# Patient Record
Sex: FEMALE | Race: WHITE | NOT HISPANIC OR LATINO
[De-identification: names, ages, dates, MRNs, and addresses within clinical notes are randomized per-mention and may not be internally consistent; named-entity substitution may affect disease eponyms.]

---

## 2018-04-17 ENCOUNTER — TRANSCRIPTION ENCOUNTER (OUTPATIENT)
Age: 73
End: 2018-04-17

## 2018-08-08 ENCOUNTER — TRANSCRIPTION ENCOUNTER (OUTPATIENT)
Age: 73
End: 2018-08-08

## 2018-09-20 ENCOUNTER — TRANSCRIPTION ENCOUNTER (OUTPATIENT)
Age: 73
End: 2018-09-20

## 2019-01-11 ENCOUNTER — TRANSCRIPTION ENCOUNTER (OUTPATIENT)
Age: 74
End: 2019-01-11

## 2019-01-25 ENCOUNTER — TRANSCRIPTION ENCOUNTER (OUTPATIENT)
Age: 74
End: 2019-01-25

## 2019-02-04 ENCOUNTER — OUTPATIENT (OUTPATIENT)
Dept: OUTPATIENT SERVICES | Facility: HOSPITAL | Age: 74
LOS: 1 days | End: 2019-02-04
Payer: COMMERCIAL

## 2019-02-04 ENCOUNTER — APPOINTMENT (OUTPATIENT)
Dept: CT IMAGING | Facility: CLINIC | Age: 74
End: 2019-02-04
Payer: COMMERCIAL

## 2019-02-04 DIAGNOSIS — Z00.8 ENCOUNTER FOR OTHER GENERAL EXAMINATION: ICD-10-CM

## 2019-02-04 PROCEDURE — 74177 CT ABD & PELVIS W/CONTRAST: CPT | Mod: 26

## 2019-02-04 PROCEDURE — 82565 ASSAY OF CREATININE: CPT

## 2019-02-04 PROCEDURE — 74177 CT ABD & PELVIS W/CONTRAST: CPT

## 2019-02-13 ENCOUNTER — TRANSCRIPTION ENCOUNTER (OUTPATIENT)
Age: 74
End: 2019-02-13

## 2019-02-25 ENCOUNTER — INPATIENT (INPATIENT)
Facility: HOSPITAL | Age: 74
LOS: 4 days | Discharge: HOME | DRG: 194 | End: 2019-03-02
Attending: INTERNAL MEDICINE | Admitting: GENERAL ACUTE CARE HOSPITAL
Payer: COMMERCIAL

## 2019-02-25 VITALS
RESPIRATION RATE: 24 BRPM | DIASTOLIC BLOOD PRESSURE: 74 MMHG | WEIGHT: 179.9 LBS | TEMPERATURE: 99 F | SYSTOLIC BLOOD PRESSURE: 134 MMHG | OXYGEN SATURATION: 96 % | HEART RATE: 103 BPM | HEIGHT: 67 IN

## 2019-02-25 DIAGNOSIS — Z29.9 ENCOUNTER FOR PROPHYLACTIC MEASURES, UNSPECIFIED: ICD-10-CM

## 2019-02-25 DIAGNOSIS — R91.1 SOLITARY PULMONARY NODULE: ICD-10-CM

## 2019-02-25 DIAGNOSIS — J45.901 UNSPECIFIED ASTHMA WITH (ACUTE) EXACERBATION: ICD-10-CM

## 2019-02-25 DIAGNOSIS — J47.9 BRONCHIECTASIS, UNCOMPLICATED: ICD-10-CM

## 2019-02-25 LAB
ANION GAP SERPL CALC-SCNC: 15 MMOL/L — SIGNIFICANT CHANGE UP (ref 5–17)
APPEARANCE UR: CLEAR — SIGNIFICANT CHANGE UP
BILIRUB UR-MCNC: NEGATIVE — SIGNIFICANT CHANGE UP
BUN SERPL-MCNC: 16 MG/DL — SIGNIFICANT CHANGE UP (ref 7–23)
CALCIUM SERPL-MCNC: 9 MG/DL — SIGNIFICANT CHANGE UP (ref 8.4–10.5)
CHLORIDE SERPL-SCNC: 99 MMOL/L — SIGNIFICANT CHANGE UP (ref 96–108)
CO2 SERPL-SCNC: 23 MMOL/L — SIGNIFICANT CHANGE UP (ref 22–31)
COLOR SPEC: SIGNIFICANT CHANGE UP
CREAT SERPL-MCNC: 0.94 MG/DL — SIGNIFICANT CHANGE UP (ref 0.5–1.3)
DIFF PNL FLD: NEGATIVE — SIGNIFICANT CHANGE UP
FLUAV H1 2009 PAND RNA SPEC QL NAA+PROBE: DETECTED
GAS PNL BLDV: SIGNIFICANT CHANGE UP
GLUCOSE SERPL-MCNC: 210 MG/DL — HIGH (ref 70–99)
GLUCOSE UR QL: ABNORMAL
HCOV PNL SPEC NAA+PROBE: DETECTED
KETONES UR-MCNC: ABNORMAL
LEUKOCYTE ESTERASE UR-ACNC: NEGATIVE — SIGNIFICANT CHANGE UP
NITRITE UR-MCNC: NEGATIVE — SIGNIFICANT CHANGE UP
PH UR: 6 — SIGNIFICANT CHANGE UP (ref 5–8)
POTASSIUM SERPL-MCNC: 4 MMOL/L — SIGNIFICANT CHANGE UP (ref 3.5–5.3)
POTASSIUM SERPL-SCNC: 4 MMOL/L — SIGNIFICANT CHANGE UP (ref 3.5–5.3)
PROT UR-MCNC: SIGNIFICANT CHANGE UP
RAPID RVP RESULT: DETECTED
SODIUM SERPL-SCNC: 137 MMOL/L — SIGNIFICANT CHANGE UP (ref 135–145)
SP GR SPEC: 1.02 — SIGNIFICANT CHANGE UP (ref 1.01–1.02)
UROBILINOGEN FLD QL: NEGATIVE — SIGNIFICANT CHANGE UP

## 2019-02-25 PROCEDURE — 71046 X-RAY EXAM CHEST 2 VIEWS: CPT | Mod: 26

## 2019-02-25 PROCEDURE — 99223 1ST HOSP IP/OBS HIGH 75: CPT

## 2019-02-25 PROCEDURE — 71250 CT THORAX DX C-: CPT | Mod: 26

## 2019-02-25 PROCEDURE — 93010 ELECTROCARDIOGRAM REPORT: CPT | Mod: NC

## 2019-02-25 PROCEDURE — 99285 EMERGENCY DEPT VISIT HI MDM: CPT | Mod: 25

## 2019-02-25 RX ORDER — ALBUTEROL 90 UG/1
2.5 AEROSOL, METERED ORAL
Qty: 0 | Refills: 0 | Status: COMPLETED | OUTPATIENT
Start: 2019-02-25 | End: 2019-02-25

## 2019-02-25 RX ORDER — BUDESONIDE, MICRONIZED 100 %
0.5 POWDER (GRAM) MISCELLANEOUS
Qty: 0 | Refills: 0 | Status: DISCONTINUED | OUTPATIENT
Start: 2019-02-25 | End: 2019-02-25

## 2019-02-25 RX ORDER — SODIUM CHLORIDE 9 MG/ML
1000 INJECTION INTRAMUSCULAR; INTRAVENOUS; SUBCUTANEOUS ONCE
Qty: 0 | Refills: 0 | Status: COMPLETED | OUTPATIENT
Start: 2019-02-25 | End: 2019-02-25

## 2019-02-25 RX ORDER — IPRATROPIUM/ALBUTEROL SULFATE 18-103MCG
3 AEROSOL WITH ADAPTER (GRAM) INHALATION
Qty: 0 | Refills: 0 | Status: DISCONTINUED | OUTPATIENT
Start: 2019-02-25 | End: 2019-03-02

## 2019-02-25 RX ORDER — AZITHROMYCIN 500 MG/1
500 TABLET, FILM COATED ORAL ONCE
Qty: 0 | Refills: 0 | Status: DISCONTINUED | OUTPATIENT
Start: 2019-02-25 | End: 2019-02-25

## 2019-02-25 RX ORDER — BUDESONIDE, MICRONIZED 100 %
0.5 POWDER (GRAM) MISCELLANEOUS EVERY 12 HOURS
Qty: 0 | Refills: 0 | Status: DISCONTINUED | OUTPATIENT
Start: 2019-02-25 | End: 2019-03-02

## 2019-02-25 RX ORDER — IPRATROPIUM/ALBUTEROL SULFATE 18-103MCG
3 AEROSOL WITH ADAPTER (GRAM) INHALATION
Qty: 0 | Refills: 0 | Status: COMPLETED | OUTPATIENT
Start: 2019-02-25 | End: 2019-02-25

## 2019-02-25 RX ORDER — CEFTRIAXONE 500 MG/1
1 INJECTION, POWDER, FOR SOLUTION INTRAMUSCULAR; INTRAVENOUS ONCE
Qty: 0 | Refills: 0 | Status: DISCONTINUED | OUTPATIENT
Start: 2019-02-25 | End: 2019-02-25

## 2019-02-25 RX ORDER — SODIUM CHLORIDE 9 MG/ML
2000 INJECTION, SOLUTION INTRAVENOUS ONCE
Qty: 0 | Refills: 0 | Status: COMPLETED | OUTPATIENT
Start: 2019-02-25 | End: 2019-02-25

## 2019-02-25 RX ORDER — LOSARTAN POTASSIUM 100 MG/1
50 TABLET, FILM COATED ORAL DAILY
Qty: 0 | Refills: 0 | Status: DISCONTINUED | OUTPATIENT
Start: 2019-02-26 | End: 2019-03-02

## 2019-02-25 RX ORDER — MONTELUKAST 4 MG/1
10 TABLET, CHEWABLE ORAL DAILY
Qty: 0 | Refills: 0 | Status: DISCONTINUED | OUTPATIENT
Start: 2019-02-25 | End: 2019-03-02

## 2019-02-25 RX ORDER — ENOXAPARIN SODIUM 100 MG/ML
40 INJECTION SUBCUTANEOUS DAILY
Qty: 0 | Refills: 0 | Status: DISCONTINUED | OUTPATIENT
Start: 2019-02-25 | End: 2019-03-02

## 2019-02-25 RX ADMIN — SODIUM CHLORIDE 2000 MILLILITER(S): 9 INJECTION, SOLUTION INTRAVENOUS at 10:10

## 2019-02-25 RX ADMIN — Medication 75 MILLIGRAM(S): at 19:21

## 2019-02-25 RX ADMIN — Medication 40 MILLIGRAM(S): at 20:09

## 2019-02-25 RX ADMIN — Medication 200 MILLIGRAM(S): at 22:14

## 2019-02-25 RX ADMIN — Medication 3 MILLILITER(S): at 22:15

## 2019-02-25 RX ADMIN — Medication 3 MILLILITER(S): at 10:20

## 2019-02-25 RX ADMIN — ALBUTEROL 2.5 MILLIGRAM(S): 90 AEROSOL, METERED ORAL at 16:50

## 2019-02-25 RX ADMIN — ALBUTEROL 2.5 MILLIGRAM(S): 90 AEROSOL, METERED ORAL at 19:21

## 2019-02-25 RX ADMIN — Medication 125 MILLIGRAM(S): at 10:10

## 2019-02-25 RX ADMIN — SODIUM CHLORIDE 1000 MILLILITER(S): 9 INJECTION INTRAMUSCULAR; INTRAVENOUS; SUBCUTANEOUS at 14:30

## 2019-02-25 RX ADMIN — Medication 3 MILLILITER(S): at 10:30

## 2019-02-25 RX ADMIN — Medication 3 MILLILITER(S): at 19:30

## 2019-02-25 RX ADMIN — ENOXAPARIN SODIUM 40 MILLIGRAM(S): 100 INJECTION SUBCUTANEOUS at 22:15

## 2019-02-25 RX ADMIN — Medication 3 MILLILITER(S): at 10:10

## 2019-02-25 NOTE — ED ADULT NURSE NOTE - OBJECTIVE STATEMENT
73 y/o F, reported to ED from home. A&Ox3, c/o SOB. Pt reports that she had an asthmatic episode starting last night. Pt treated with Albuterol at home. Pt reports that she was still experiencing SOB today. Pt's RR is 26 upon arrival to ED with an Sp02 of 95% on RA. Pt's HR is in the 100's. Pt reports MEMBRENO and reports increased difficulty with breathing. Pt reports that her discomfort is 5/10 on a pain scale. Lung sounds wheezing BL in all lobes with expiratory wheeze. Pt has a productive cough with yellowish secretions. Pt reports a runny nose but denies fevers. Pt reports that she has occasional chills. Pt also reports a hx of bronchiectasis. Pt denies LOC, H/A, C/P, N/V/D, abd pain. Pt denies urinary symptoms.  at bedside, will continue to monitor pt.

## 2019-02-25 NOTE — ED PROVIDER NOTE - PHYSICAL EXAMINATION
CONSTITUTIONAL: Patient is awake, alert and oriented x 3. Patient is mildly tachypneic but  in no acute distress.  HEAD: NCAT,   EYES: PERRL b/l, EOMI,   ENT: Airway patent, Nasal mucosa clear. Mouth with normal mucosa. Throat has no vesicles, no oropharyngeal exudates and uvula is midline.  NECK: supple, No LAD,  LUNGS: Tachypnea. Overall poor airflow b/l. There is mild wheezing in the left upper lobe, otherwise CTA B/L,   HEART: Tachycardic RR.+S1S2 no murmurs,   ABDOMEN: Soft nd/nt+bs no rebound or guarding.   EXTREMITY: no edema or calf tenderness b/l, FROM upper and lower ext b/l  SKIN: with no rash or lesions.   NEURO: No focal deficits

## 2019-02-25 NOTE — CONSULT NOTE ADULT - CONSULT REASON
asthma/bronchiectasis with exacerbation; asthma/bronchiectasis with exacerbation; lung nodule; mucous plugging asthma/bronchiectasis with exacerbation; influenza; coronaviral infection; lung nodule; mucous plugging

## 2019-02-25 NOTE — ED PROVIDER NOTE - OBJECTIVE STATEMENT
74 year old female with pmhx HTN, asthma, bronchiectasis presents to ED c/o acute SOB starting last night. Patient reports intermittent coughing over the past month for which she was prescribed steroids two separate times last course finished last week. 74 year old female with pmhx HTN, asthma, bronchiectasis presents to ED c/o acute SOB starting last night. Patient reports intermittent coughing over the past month for which she was prescribed steroids two separate times last course finished last week. She states that cough is productive in nature, has been taking nebulizer treatments at home without relief. Denies fevers, chills, chest pain, abdominal pain, n/v/d, urinary symptoms, sick contacts, recent travel, calf swelling. Has been admitted for similar in past but denies prior icu stays or previous intubations

## 2019-02-25 NOTE — CONSULT NOTE ADULT - ASSESSMENT
ASSESSMENT:    persistent asthma/bronchiectasis exacerbation unresponsive to 2 courses of systemic steroids, inhaled steroids and long acting beta agonist nebulizers - "rule out" superimposed bacterial infection    HTN/HLD    h/o sigmoid diverticulitis s/p Melinda's procedure and reversal of colostomy in 2011    PLAN/RECOMMENDATIONS:    oxygen supplementation as needed to keep saturation greater than 92%  chest CT to better evaluate the lung parenchyma  RVP  solumedrol 40mg IVP q6h  albuterol/atrovent nebs q4h holding 2am dose  pulmicort nebs 0.5mg 2 times daily  singulair  GI/DVT prophylaxis  cozaar    Thank you for the courtesy of this referral. Plan of care discussed with the patient and her  at bedside and the ER staff.    Los Santos MD, [facility] - [phone]  Pulmonary Medicine ASSESSMENT:    persistent asthma/bronchiectasis exacerbation unresponsive to 2 courses of systemic steroids, inhaled steroids and long acting beta agonist nebulizers due to influenza and a coronaviral infection - "rule out" a superimposed bacterial infection    HTN/HLD    h/o sigmoid diverticulitis s/p Melinda's procedure and reversal of colostomy in 2011    PLAN/RECOMMENDATIONS:    oxygen supplementation as needed to keep saturation greater than 92%  chest CT to better evaluate the lung parenchyma  solumedrol 40mg IVP q6h  albuterol/atrovent nebs q4h holding 2am dose  pulmicort nebs 0.5mg 2 times daily  singulair  GI/DVT prophylaxis  cozaar    Thank you for the courtesy of this referral. Plan of care discussed with the patient and her  at bedside and the ER staff.    Los Santos MD, [facility] - [phone]  Pulmonary Medicine

## 2019-02-25 NOTE — H&P ADULT - NEGATIVE CARDIOVASCULAR SYMPTOMS
no paroxysmal nocturnal dyspnea/no palpitations/no chest pain/no dyspnea on exertion/no peripheral edema

## 2019-02-25 NOTE — H&P ADULT - ASSESSMENT
73 y/o F with PMH of Bronchiectasis, Asthma, and HTN p/w 3 days of worsening shortness of breath, wheezing, productive yellow cough.

## 2019-02-25 NOTE — ED ADULT NURSE REASSESSMENT NOTE - NS ED NURSE REASSESS COMMENT FT1
Pt is experiencing difficulty breathing suddenly. Pt's RR was 28, Sp02 is 90% on RA, and she feels as if her chest is "tight". Pt was placed on 4L NC and repositioned. Pt has expiratory wheeze in the left anterior chest. Pt was reassess by Chanell VELEZ. Pt was started on Albuterol treatment.  at bedside, will continue to monitor pt.

## 2019-02-25 NOTE — H&P ADULT - HISTORY OF PRESENT ILLNESS
73 y/o F with PMH of Bronchiectasis, Asthma, and HTN     ED vitals: 98.6, 103, 134/74, 26, 94% on RA  In ER was given 3L fluids, duoneb, solumedrol 73 y/o F with PMH of Bronchiectasis, Asthma, and HTN p/w 3 days of worsening shortness of breath, wheezing, productive yellow cough. Endorses chills but no fever/rigors. Patient has had URI symptoms and post nasal drip for the past few weeks and got 2 courses of po steroids as outpatient for urgent care and saw her outside pulmonologist who added singular which did help with symptoms. Denies hx of clots, LE swelling or pain, chest pain, n/v, abdominal pain.  endorses URI symptoms last weeks    ED vitals: 98.6, 103, 134/74, 26, 94% on RA  In ER was given 3L fluids, duoneb, solumedrol

## 2019-02-25 NOTE — ED PROVIDER NOTE - ATTENDING CONTRIBUTION TO CARE
75 yo F h/o asthma and bronchiectasis presents with 1     month of worsening cough and sob. cough is newly     productive of yellow phlegm and sob is rated a 5/10 at     this time. overnight cough/sob was severe, kept her up all     night coughing. she has been on two courses of prednisone,     5 days followed by 10 days, completed few days ago. stated     that meds helped initially but now symptoms have returned.     + rhinorrhea/nasal congestion.   no f/ch, chest pain, abd pain, N/V/D, leg pain/swelling.   no known PE/DVT risk factors.   PE no resp distress. lungs CTA. abd soft and NT. no leg     swelling or TTP. 73 yo F h/o asthma and bronchiectasis presents with 1   month of worsening cough and sob. cough is newly   productive of yellow phlegm and sob is rated a 5/10 at   this time. overnight cough/sob was severe, kept her up all   night coughing. she has been on two courses of prednisone,   5 days followed by 10 days, completed few days ago. stated   that meds helped initially but now symptoms have returned.   + rhinorrhea/nasal congestion.   no f/ch, chest pain, abd pain, N/V/D, leg pain/swelling.   no known PE/DVT risk factors.   PE no resp distress. lungs CTA. abd soft and NT. no leg   swelling or TTP.  cxr with clear lungs. ekg with sinu tach no acute st/tw changes. age adjusted d dimer is negative. mlid leukocytosis. paitent given duonebs and steroids in the er. pulmonology was conuslted, who came to see pt in the er and recommended admission given failure of 2 outpatient treatment courses. pulm to order abx of choice. pt admitted in stable confition    I reviewed all lab and imaging results from this ED visit, and discussed ALL results with the patient and/or family, including all abnormal results and incidental findings. I addressed all of patient's/family's questions and concerns, and I recommended appropriate follow up for all incidental findings. Based on patient's HPI as well as the results of today's workup, I felt that patient warranted admission to the hospital for further workup/evaluation and continued management. The patient was agreeable with admission. Patient was signed out to admitting team. Admitting team accepted the patient for admission and subsequently took over the patient's care in its entirety.

## 2019-02-25 NOTE — H&P ADULT - PROBLEM SELECTOR PLAN 3
0.5 cm solid nodule in the right lower lobe is unchanged when compared to previous exam  -outpatient followup for serial CTs

## 2019-02-25 NOTE — H&P ADULT - NSHPLABSRESULTS_GEN_ALL_CORE
personally reviewed labs:  WBC of 16, N 83%  D-dimer 231  CMP wnl except for tbili 2.3 (other LFT wnl)  vbg pH 7.38-7.42, pc02 44-50, 2.6 from 3.0  personally reviewed EKG: sinus @ 101, inverted p wave in v2, low voltage qrs    personally reviewed CT chest and chest xray: PROCEDURE DATE:  02/25/2019    INTERPRETATION:  Clinical information: Cough and shortness of breath.   Exam is compared to previous study of 8/19/2014.    CT scan of the chest was obtained without administration of intravenous   contrast.    No hilar and/or mediastinal adenopathy is noted.   Heart is normal in size. Calcification of the aortic valve and the   coronary arteries is noted. No pericardial effusion is noted.   No endobronchial lesions are noted. Mucus/secretions are present within   several bronchi within both lower lobes. 0.5 cm solid nodule in the right   lower lobe is unchanged when compared to previous exam. Remaining lungs   are clear. No pleural effusions are noted.  Small hiatal hernia is noted  Below the diaphragm, visualized portions of the abdomen demonstrate   patient to be status post cholecystectomy.   Degenerative changes of the spine are noted.  Impression: There is no pneumonia.

## 2019-02-25 NOTE — CONSULT NOTE ADULT - SUBJECTIVE AND OBJECTIVE BOX
[facility] - [facility]  CONSULT NOTE    HPI: 74 year old gentlewoman, lifelong non-smoker, followed by Dr. Ifeoma Bell of our practice for asthma and bronchiectasis. She was recently seen in our office with mild chest congestion associated wiht shortness of breath with exertion and post nasal drip. She is maintained on Pulmicort and Brovana nebulizers twice daily and was recently started on Singulair. She has required 2 steroid tapers over the last month    PMHX:  Asthma  Bronchiectasis  Bronchitis  Pneumonia - 2011  Diverticulitis  Hypercholesterolemia  Hypertension    PSHX:  Melinda's procedure with reversal of colostomy  History of Dilatation and Curettage      FAMILY HISTORY:  mother - Alzheimer's dementia  father - CAD  brother - CAD/MI  brother - DM    SOCIAL HISTORY:  lifelong non-smoker; caring for her sick ;     Pulmonary Medications:       Antimicrobials:      Cardiology:      Other:      Allergies    Bactrim (Short breath)  penicillins (Rash)  Zyflo (Stomach Upset; Vomiting)    Intolerances        HOME MEDICATIONS: see  H & P    REVIEW OF SYSTEMS:  Constitutional: As per HPI  HEENT: Within normal limits  CV: As per HPI  Resp: As per HPI  GI: Within normal limits   : Within normal limits  Musculoskeletal: Within normal limits  Skin: Within normal limits  Neurological: Within normal limits  Psychiatric: Within normal limits  Endocrine: Within normal limits  Hematologic/Lymphatic: Within normal limits  Allergic/Immunologic: Within normal limits    [x] All other systems negative    OBJECTIVE:    Daily Height in cm: 170.18 (25 Feb 2019 09:23)      PHYSICAL EXAM:  ICU Vital Signs Last 24 Hrs  T(C): 37.3 (25 Feb 2019 10:00), Max: 37.3 (25 Feb 2019 10:00)  T(F): 99.1 (25 Feb 2019 10:00), Max: 99.1 (25 Feb 2019 10:00)  HR: 101 (25 Feb 2019 11:21) (101 - 108)  BP: 133/59 (25 Feb 2019 11:21) (133/59 - 155/89)  BP(mean): --  ABP: --  ABP(mean): --  RR: 20 (25 Feb 2019 11:21) (20 - 26)  SpO2: 99% (25 Feb 2019 11:21) (95% - 99%)    General: Awake. Alert. Cooperative. No distress. Appears stated age 	  HEENT:   Atraumatic. Normocephalic. Anicteric. Normal oral mucosa. PERRL. EOMI.  Neck: Supple. Trachea midline. Thyroid without enlargement/tenderness/nodules. No carotid bruit. No JVD.	  Cardiovascular: Regular rate and rhythm. S1 S2 normal. No murmurs, rubs or gallops.  Respiratory: Respirations unlabored. Clear to auscultation and percussion bilaterally. No curvature.  Abdomen: Soft. Non-tender. Non-distended. No organomegaly. No masses. Normal bowel sounds.  Extremities: Warm to touch. No clubbing or cyanosis. No pedal edema.  Pulses: 2+ peripheral pulses all extremities.	  Skin: Normal skin color. No rashes or lesions. No ecchymoses. No cyanosis. Warm to touch.  Lymph Nodes: Cervical, supraclavicular and axillary nodes normal  Neurological: Motor and sensory examination equal and normal. A and O x 3  Psychiatry: Appropriate mood and affect.      LABS:                          16.6   16.1  )-----------( 301      ( 25 Feb 2019 10:13 )             50.3     02-25    140  |  98  |  18  ----------------------------<  193<H>  4.1   |  25  |  1.00    Ca      9.8      02-25      TPro  7.5  /  Alb  4.6  /  TBili  2.3<H>  /  DBili  x   /  AST  19  /  ALT  16  /  AlkPhos  98  02-25    PT/INR - ( 25 Feb 2019 10:13 )   PT: 14.0 sec;   INR: 1.22 ratio       PTT - ( 25 Feb 2019 10:13 )  PTT: 29.9 sec    Venous Blood Gas:  02-25 @ 10:12  7.38/50/24/29/35  VBG Lactate: 3.0    D-Dimer Assay, Quantitative: 231 ng/mL DDU (02-25 @ 10:13)    MICROBIOLOGY:           RADIOLOGY:  [x ] Chest radiographs reviewed and interpreted by me    < from: Xray Chest 2 Views PA/Lat (02.25.19 @ 10:46) >    EXAM:  XR CHEST PA LAT 2V                          PROCEDURE DATE:  02/25/2019      INTERPRETATION:  HISTORY: Sepsis    TECHNIQUE: PA and lateral views of the chest were obtained.    COMPARISON: 5/10/2014    FINDINGS:  The cardiac silhouette is normal in size. There are no focal   consolidations or pleural effusions. The hilar and mediastinal structures   appear unremarkable. The osseous structures are intact.    IMPRESSION: Clear lungs.    NEFTALY KOROMA M.D., ATTENDING RADIOLOGIST  This document has been electronically signed. Feb 25 2019 10:48AM    < end of copied text >  ---------------------------------------------------------------------------------------------------------------    < from: CT Abdomen and Pelvis w/ Oral Cont and w/ IV Cont (02.04.19 @ 17:25) >    EXAM:  CT ABDOMEN AND PELVIS OC IC      PROCEDURE DATE:  02/04/2019       INTERPRETATION:  CLINICAL INFORMATION: Right upper quadrant pain, history   of cholecystectomy.         COMPARISON: No prior CT. Abdominal ultrasound 8/9/2011    PROCEDURE:   CT of the Abdomen and Pelvis was performed with intravenous contrast.   Intravenous contrast: 90 ml Omnipaque 350. 10 ml discarded.  Oral contrast: positive contrast was administered.  Sagittal and coronal reformats were performed.    FINDINGS:    LOWER CHEST: Within normal limits.    LIVER: Within normal limits.  BILE DUCTS: Normal caliber.  GALLBLADDER: Cholecystectomy.  SPLEEN: Within normal limits.  PANCREAS: Within normal limits.  ADRENALS: Within normal limits.  KIDNEYS/URETERS: Small, subcentimeter bilateral hypodense renal foci are   too small to characterize. No hydronephrosis.    BLADDER: Within normal limits.  REPRODUCTIVE ORGANS: Uterus and adnexa are within normal limits.    BOWEL: Sigmoid anastomosis from prior resection. No bowel obstruction.   Appendix is normal.  PERITONEUM: No ascites.  VESSELS:  Scattered atherosclerotic calcifications.  RETROPERITONEUM: No lymphadenopathy.    ABDOMINAL WALL: Within normal limits.  BONES: Multilevel spinal degenerative changes.    IMPRESSION: Etiology of the patient's pain is not evident.    SHARON RUIZ M.D., ATTENDING RADIOLOGIST   This document has been electronically signed. Feb 5 2019  9:31AM      < end of copied text >  --------------------------------------------------------------------------------------------------------------- [facility] - [facility]  CONSULT NOTE    HPI: 74 year old gentlewoman, lifelong non-smoker, followed by Dr. Ifeoma Bell of our practice for asthma and bronchiectasis. She is maintained on Pulmicort and Brovana nebulizers twice daily. She was recently seen in our office with mild chest congestion associated with shortness of breath with exertion and post nasal drip. Singulair was added to her medical regimen which she did not start. The patient has been treated with 2 courses of systemic steroids over the last 2 weeks prescribed at an urgent medical care center for worsening shortness of breath, chest congestion and wheeze. The patient reports initial improvement but last night developed shortness of breath at rest and severe chest congestion and wheeze. She has had chills without fevers or sweats. She has a cough productive of yellow sputum. She has no chest pain/pressure or palpitations. She comes to the ER for further evaluation and treatment. Asked to evaluate.    PMHX:  Asthma  Bronchiectasis  Bronchitis  Pneumonia - 2011  Diverticulitis  Hypercholesterolemia  Hypertension    PSHX:  Melinda's procedure with reversal of colostomy - 2011  History of Dilatation and Curettage    FAMILY HISTORY:  mother - Alzheimer's dementia  father - CAD  brother - CAD/MI  brother - DM    SOCIAL HISTORY:  lifelong non-smoker; ;     Pulmonary Medications:       Antimicrobials:      Cardiology:      Other:      Allergies    Bactrim (Short breath)  penicillins (Rash)  Zyflo (Stomach Upset; Vomiting)      HOME MEDICATIONS: see  H & P    REVIEW OF SYSTEMS:  Constitutional: As per HPI  HEENT: Within normal limits  CV: As per HPI  Resp: As per HPI  GI: Within normal limits   : Within normal limits  Musculoskeletal: Within normal limits  Skin: Within normal limits  Neurological: Within normal limits  Psychiatric: Within normal limits  Endocrine: Within normal limits  Hematologic/Lymphatic: Within normal limits  Allergic/Immunologic: Within normal limits    [x] All other systems negative    OBJECTIVE:    Daily Height in cm: 170.18 (25 Feb 2019 09:23)      PHYSICAL EXAM:  ICU Vital Signs Last 24 Hrs  T(C): 37.3 (25 Feb 2019 10:00), Max: 37.3 (25 Feb 2019 10:00)  T(F): 99.1 (25 Feb 2019 10:00), Max: 99.1 (25 Feb 2019 10:00)  HR: 101 (25 Feb 2019 11:21) (101 - 108)  BP: 133/59 (25 Feb 2019 11:21) (133/59 - 155/89)  BP(mean): --  ABP: --  ABP(mean): --  RR: 20 (25 Feb 2019 11:21) (20 - 26)  SpO2: 99% (25 Feb 2019 11:21) (95% - 99%) on 2lpm nasal canula    General: Awake. Alert. Cooperative. Audible wheeze. Appears stated age 	  HEENT: Atraumatic. Normocephalic. Anicteric. Normal oral mucosa. PERRL. EOMI. Mallampati class III airway.   Neck: Supple. Trachea midline. Thyroid without enlargement/tenderness/nodules. No carotid bruit. No JVD. Short and wide  Cardiovascular: Regular rate and rhythm. S1 S2 normal. No murmurs, rubs or gallops.  Respiratory: Respirations unlabored. Diffuse rhonchi and wheeze. No curvature.  Abdomen: Soft. Non-tender. Non-distended. No organomegaly. No masses. Normal bowel sounds. Obese.  Extremities: Warm to touch. No clubbing or cyanosis. No pedal edema.  Pulses: 2+ peripheral pulses all extremities.	  Skin: Normal skin color. No rashes or lesions. No ecchymoses. No cyanosis. Warm to touch.  Lymph Nodes: Cervical, supraclavicular and axillary nodes normal  Neurological: Motor and sensory examination equal and normal. A and O x 3  Psychiatry: Appropriate mood and affect.      LABS:                          16.6   16.1  )-----------( 301      ( 25 Feb 2019 10:13 )             50.3     02-25    140  |  98  |  18  ----------------------------<  193<H>  4.1   |  25  |  1.00    Ca      9.8      02-25      TPro  7.5  /  Alb  4.6  /  TBili  2.3<H>  /  DBili  x   /  AST  19  /  ALT  16  /  AlkPhos  98  02-25    PT/INR - ( 25 Feb 2019 10:13 )   PT: 14.0 sec;   INR: 1.22 ratio       PTT - ( 25 Feb 2019 10:13 )  PTT: 29.9 sec    Venous Blood Gas:  02-25 @ 10:12  7.38/50/24/29/35  VBG Lactate: 3.0    D-Dimer Assay, Quantitative: 231 ng/mL DDU (02-25 @ 10:13)    MICROBIOLOGY:           RADIOLOGY:  [x ] Chest radiographs reviewed and interpreted by me    < from: Xray Chest 2 Views PA/Lat (02.25.19 @ 10:46) >    EXAM:  XR CHEST PA LAT 2V                          PROCEDURE DATE:  02/25/2019      INTERPRETATION:  HISTORY: Sepsis    TECHNIQUE: PA and lateral views of the chest were obtained.    COMPARISON: 5/10/2014    FINDINGS:  The cardiac silhouette is normal in size. There are no focal   consolidations or pleural effusions. The hilar and mediastinal structures   appear unremarkable. The osseous structures are intact.    IMPRESSION: Clear lungs.    NEFTALY KOROMA M.D., ATTENDING RADIOLOGIST  This document has been electronically signed. Feb 25 2019 10:48AM    < end of copied text >  ---------------------------------------------------------------------------------------------------------------    < from: CT Abdomen and Pelvis w/ Oral Cont and w/ IV Cont (02.04.19 @ 17:25) >    EXAM:  CT ABDOMEN AND PELVIS OC IC      PROCEDURE DATE:  02/04/2019       INTERPRETATION:  CLINICAL INFORMATION: Right upper quadrant pain, history   of cholecystectomy.         COMPARISON: No prior CT. Abdominal ultrasound 8/9/2011    PROCEDURE:   CT of the Abdomen and Pelvis was performed with intravenous contrast.   Intravenous contrast: 90 ml Omnipaque 350. 10 ml discarded.  Oral contrast: positive contrast was administered.  Sagittal and coronal reformats were performed.    FINDINGS:    LOWER CHEST: Within normal limits.    LIVER: Within normal limits.  BILE DUCTS: Normal caliber.  GALLBLADDER: Cholecystectomy.  SPLEEN: Within normal limits.  PANCREAS: Within normal limits.  ADRENALS: Within normal limits.  KIDNEYS/URETERS: Small, subcentimeter bilateral hypodense renal foci are   too small to characterize. No hydronephrosis.    BLADDER: Within normal limits.  REPRODUCTIVE ORGANS: Uterus and adnexa are within normal limits.    BOWEL: Sigmoid anastomosis from prior resection. No bowel obstruction.   Appendix is normal.  PERITONEUM: No ascites.  VESSELS:  Scattered atherosclerotic calcifications.  RETROPERITONEUM: No lymphadenopathy.    ABDOMINAL WALL: Within normal limits.  BONES: Multilevel spinal degenerative changes.    IMPRESSION: Etiology of the patient's pain is not evident.    SHARON RUIZ M.D., ATTENDING RADIOLOGIST   This document has been electronically signed. Feb 5 2019  9:31AM      < end of copied text >  --------------------------------------------------------------------------------------------------------------- [facility] - [facility]  CONSULT NOTE    HPI: 74 year old gentlewoman, lifelong non-smoker, followed by Dr. Ifeoma Bell of our practice for asthma and bronchiectasis. She is maintained on Pulmicort and Brovana nebulizers twice daily. She was recently seen in our office with mild chest congestion associated with shortness of breath with exertion and post nasal drip. Singulair was added to her medical regimen which she did not start. The patient has been treated with 2 courses of systemic steroids over the last 2 weeks prescribed at an urgent medical care center for worsening shortness of breath, chest congestion and wheeze. The patient reports initial improvement but last night developed shortness of breath at rest and severe chest congestion and wheeze. She has had chills without fevers or sweats. She has a cough productive of yellow sputum. She has no chest pain/pressure or palpitations. She comes to the ER for further evaluation and treatment. Asked to evaluate.    PMHX:  Asthma  Bronchiectasis  Bronchitis  Pneumonia - 2011  Diverticulitis  Hypercholesterolemia  Hypertension    PSHX:  Melinda's procedure with reversal of colostomy - 2011  History of Dilatation and Curettage    FAMILY HISTORY:  mother - Alzheimer's dementia  father - CAD  brother - CAD/MI  brother - DM    SOCIAL HISTORY:  lifelong non-smoker; ;     Pulmonary Medications:       Antimicrobials:      Cardiology:      Other:      Allergies    Bactrim (Short breath)  penicillins (Rash)  Zyflo (Stomach Upset; Vomiting)      HOME MEDICATIONS: see  H & P    REVIEW OF SYSTEMS:  Constitutional: As per HPI  HEENT: Within normal limits  CV: As per HPI  Resp: As per HPI  GI: Within normal limits   : Within normal limits  Musculoskeletal: Within normal limits  Skin: Within normal limits  Neurological: Within normal limits  Psychiatric: Within normal limits  Endocrine: Within normal limits  Hematologic/Lymphatic: Within normal limits  Allergic/Immunologic: Within normal limits    [x] All other systems negative    OBJECTIVE:    Daily Height in cm: 170.18 (25 Feb 2019 09:23)      PHYSICAL EXAM:  ICU Vital Signs Last 24 Hrs  T(C): 37.3 (25 Feb 2019 10:00), Max: 37.3 (25 Feb 2019 10:00)  T(F): 99.1 (25 Feb 2019 10:00), Max: 99.1 (25 Feb 2019 10:00)  HR: 101 (25 Feb 2019 11:21) (101 - 108)  BP: 133/59 (25 Feb 2019 11:21) (133/59 - 155/89)  BP(mean): --  ABP: --  ABP(mean): --  RR: 20 (25 Feb 2019 11:21) (20 - 26)  SpO2: 99% (25 Feb 2019 11:21) (95% - 99%) on 2lpm nasal canula    General: Awake. Alert. Cooperative. Audible wheeze. Appears stated age 	  HEENT: Atraumatic. Normocephalic. Anicteric. Normal oral mucosa. PERRL. EOMI. Mallampati class III airway.   Neck: Supple. Trachea midline. Thyroid without enlargement/tenderness/nodules. No carotid bruit. No JVD. Short and wide  Cardiovascular: Regular rate and rhythm. S1 S2 normal. No murmurs, rubs or gallops.  Respiratory: Respirations unlabored. Diffuse rhonchi and wheeze. No curvature.  Abdomen: Soft. Non-tender. Non-distended. No organomegaly. No masses. Normal bowel sounds. Obese.  Extremities: Warm to touch. No clubbing or cyanosis. No pedal edema.  Pulses: 2+ peripheral pulses all extremities.	  Skin: Normal skin color. No rashes or lesions. No ecchymoses. No cyanosis. Warm to touch.  Lymph Nodes: Cervical, supraclavicular and axillary nodes normal  Neurological: Motor and sensory examination equal and normal. A and O x 3  Psychiatry: Appropriate mood and affect.      LABS:                          16.6   16.1  )-----------( 301      ( 25 Feb 2019 10:13 )             50.3     02-25    140  |  98  |  18  ----------------------------<  193<H>  4.1   |  25  |  1.00    Ca      9.8      02-25      TPro  7.5  /  Alb  4.6  /  TBili  2.3<H>  /  DBili  x   /  AST  19  /  ALT  16  /  AlkPhos  98  02-25    PT/INR - ( 25 Feb 2019 10:13 )   PT: 14.0 sec;   INR: 1.22 ratio       PTT - ( 25 Feb 2019 10:13 )  PTT: 29.9 sec    Venous Blood Gas:  02-25 @ 10:12  7.38/50/24/29/35  VBG Lactate: 3.0    D-Dimer Assay, Quantitative: 231 ng/mL DDU (02-25 @ 10:13)    MICROBIOLOGY:     Rapid Respiratory Viral Panel (02.25.19 @ 16:34)    Rapid RVP Result: Detected: The [employer] RVP Rapid uses polymerase chain reaction (PCR) and melt  curve analysis to screen for adenovirus; coronavirus HKU1, NL63, 229E,  OC43; human metapneumovirus (hMPV); human enterovirus/rhinovirus  (Entero/RV); influenza A; influenza A/H1;influenza A/H3; influenza  A/H1-2009; influenza B; parainfluenza viruses 1, 2, 3, 4; respiratory  syncytial virus; Bordetella pertussis; Mycoplasma pneumoniae; and  Chlamydophila pneumoniae.    Influenza AH3 (RapRVP): Detected    RADIOLOGY:  [x ] Chest radiographs reviewed and interpreted by me    < from: Xray Chest 2 Views PA/Lat (02.25.19 @ 10:46) >    EXAM:  XR CHEST PA LAT 2V                          PROCEDURE DATE:  02/25/2019      INTERPRETATION:  HISTORY: Sepsis    TECHNIQUE: PA and lateral views of the chest were obtained.    COMPARISON: 5/10/2014    FINDINGS:  The cardiac silhouette is normal in size. There are no focal   consolidations or pleural effusions. The hilar and mediastinal structures   appear unremarkable. The osseous structures are intact.    IMPRESSION: Clear lungs.    NEFTALY KOROMA M.D., ATTENDING RADIOLOGIST  This document has been electronically signed. Feb 25 2019 10:48AM    < end of copied text >  ---------------------------------------------------------------------------------------------------------------    < from: CT Abdomen and Pelvis w/ Oral Cont and w/ IV Cont (02.04.19 @ 17:25) >    EXAM:  CT ABDOMEN AND PELVIS OC IC      PROCEDURE DATE:  02/04/2019       INTERPRETATION:  CLINICAL INFORMATION: Right upper quadrant pain, history   of cholecystectomy.         COMPARISON: No prior CT. Abdominal ultrasound 8/9/2011    PROCEDURE:   CT of the Abdomen and Pelvis was performed with intravenous contrast.   Intravenous contrast: 90 ml Omnipaque 350. 10 ml discarded.  Oral contrast: positive contrast was administered.  Sagittal and coronal reformats were performed.    FINDINGS:    LOWER CHEST: Within normal limits.    LIVER: Within normal limits.  BILE DUCTS: Normal caliber.  GALLBLADDER: Cholecystectomy.  SPLEEN: Within normal limits.  PANCREAS: Within normal limits.  ADRENALS: Within normal limits.  KIDNEYS/URETERS: Small, subcentimeter bilateral hypodense renal foci are   too small to characterize. No hydronephrosis.    BLADDER: Within normal limits.  REPRODUCTIVE ORGANS: Uterus and adnexa are within normal limits.    BOWEL: Sigmoid anastomosis from prior resection. No bowel obstruction.   Appendix is normal.  PERITONEUM: No ascites.  VESSELS:  Scattered atherosclerotic calcifications.  RETROPERITONEUM: No lymphadenopathy.    ABDOMINAL WALL: Within normal limits.  BONES: Multilevel spinal degenerative changes.    IMPRESSION: Etiology of the patient's pain is not evident.    SHARON RUIZ M.D., ATTENDING RADIOLOGIST   This document has been electronically signed. Feb 5 2019  9:31AM      < end of copied text >  ---------------------------------------------------------------------------------------------------------------

## 2019-02-25 NOTE — H&P ADULT - PROBLEM SELECTOR PLAN 1
asthma exacerbation likely viral induced as CT chest negative for PNA  -f/u RVP   -started on levaquin by pulm, low threshold to stop given negative CT but given leukocytosis and mild lactate, will await blood culture results  -c/w albuterol/atrovent nebs q4h holding 2am dose  -c/w Pulmicort nebs 0.5mg 2 times daily  -c/w singular 10mg daily  -c/w supplemental 02, goal 02> 92%, monitor

## 2019-02-25 NOTE — ED PROVIDER NOTE - PMH
Antibody Deficiency Syndrome    Asthma  /Chronic brochitiss    Diverticulitis of colon (without mention of hemorrhage)    History of Hypertension    Hypercholesterolemia    Pneumonia  2011

## 2019-02-26 LAB
ANION GAP SERPL CALC-SCNC: 16 MMOL/L — SIGNIFICANT CHANGE UP (ref 5–17)
BUN SERPL-MCNC: 17 MG/DL — SIGNIFICANT CHANGE UP (ref 7–23)
CALCIUM SERPL-MCNC: 9.2 MG/DL — SIGNIFICANT CHANGE UP (ref 8.4–10.5)
CHLORIDE SERPL-SCNC: 103 MMOL/L — SIGNIFICANT CHANGE UP (ref 96–108)
CO2 SERPL-SCNC: 20 MMOL/L — LOW (ref 22–31)
CREAT SERPL-MCNC: 0.82 MG/DL — SIGNIFICANT CHANGE UP (ref 0.5–1.3)
GLUCOSE SERPL-MCNC: 272 MG/DL — HIGH (ref 70–99)
HCT VFR BLD CALC: 44.2 % — SIGNIFICANT CHANGE UP (ref 34.5–45)
HGB BLD-MCNC: 13.7 G/DL — SIGNIFICANT CHANGE UP (ref 11.5–15.5)
MCHC RBC-ENTMCNC: 27.5 PG — SIGNIFICANT CHANGE UP (ref 27–34)
MCHC RBC-ENTMCNC: 31 GM/DL — LOW (ref 32–36)
MCV RBC AUTO: 88.6 FL — SIGNIFICANT CHANGE UP (ref 80–100)
PLATELET # BLD AUTO: 276 K/UL — SIGNIFICANT CHANGE UP (ref 150–400)
POTASSIUM SERPL-MCNC: 3.9 MMOL/L — SIGNIFICANT CHANGE UP (ref 3.5–5.3)
POTASSIUM SERPL-SCNC: 3.9 MMOL/L — SIGNIFICANT CHANGE UP (ref 3.5–5.3)
RBC # BLD: 4.99 M/UL — SIGNIFICANT CHANGE UP (ref 3.8–5.2)
RBC # FLD: 14.6 % — HIGH (ref 10.3–14.5)
SODIUM SERPL-SCNC: 139 MMOL/L — SIGNIFICANT CHANGE UP (ref 135–145)
WBC # BLD: 12.62 K/UL — HIGH (ref 3.8–10.5)
WBC # FLD AUTO: 12.62 K/UL — HIGH (ref 3.8–10.5)

## 2019-02-26 RX ADMIN — ENOXAPARIN SODIUM 40 MILLIGRAM(S): 100 INJECTION SUBCUTANEOUS at 11:34

## 2019-02-26 RX ADMIN — Medication 75 MILLIGRAM(S): at 06:16

## 2019-02-26 RX ADMIN — LOSARTAN POTASSIUM 50 MILLIGRAM(S): 100 TABLET, FILM COATED ORAL at 06:16

## 2019-02-26 RX ADMIN — Medication 40 MILLIGRAM(S): at 23:25

## 2019-02-26 RX ADMIN — Medication 200 MILLIGRAM(S): at 06:16

## 2019-02-26 RX ADMIN — Medication 3 MILLILITER(S): at 09:38

## 2019-02-26 RX ADMIN — Medication 40 MILLIGRAM(S): at 17:26

## 2019-02-26 RX ADMIN — Medication 40 MILLIGRAM(S): at 01:15

## 2019-02-26 RX ADMIN — Medication 200 MILLIGRAM(S): at 21:36

## 2019-02-26 RX ADMIN — Medication 3 MILLILITER(S): at 06:16

## 2019-02-26 RX ADMIN — Medication 3 MILLILITER(S): at 21:36

## 2019-02-26 RX ADMIN — Medication 75 MILLIGRAM(S): at 17:26

## 2019-02-26 RX ADMIN — Medication 40 MILLIGRAM(S): at 11:34

## 2019-02-26 RX ADMIN — Medication 0.5 MILLIGRAM(S): at 06:16

## 2019-02-26 RX ADMIN — Medication 200 MILLIGRAM(S): at 13:14

## 2019-02-26 RX ADMIN — Medication 3 MILLILITER(S): at 13:14

## 2019-02-26 RX ADMIN — Medication 3 MILLILITER(S): at 17:26

## 2019-02-26 RX ADMIN — Medication 0.5 MILLIGRAM(S): at 17:27

## 2019-02-26 RX ADMIN — Medication 40 MILLIGRAM(S): at 09:38

## 2019-02-26 RX ADMIN — MONTELUKAST 10 MILLIGRAM(S): 4 TABLET, CHEWABLE ORAL at 11:34

## 2019-02-26 NOTE — PROGRESS NOTE ADULT - ASSESSMENT
75 y/o F with PMH of Bronchiectasis, Asthma, and HTN p/w  worsening shortness of breath, wheezing, productive yellow cough unresponsive to o/p steroids and nebs .     Problem/Plan - 1:  ·  Problem: Exacerbation of persistent asthma,/ bronchiectasis Plan: asthma exacerbation likely viral induced as CT chest negative for PNA  RVP positive for corona virus/ /influenza   -c/w albuterol/atrovent nebs q4h holding 2am dose  -c/w Pulmicort nebs 0.5mg 2 times daily  -c/w singular 10mg daily  -c/w supplemental 02, goal 02> 92%, monitor.   - empiric abx fo rnow   : will check sputum culture s and consider dcing if neg    Problem/Plan - 2:  ·  Problem: Bronchiectasis.  Plan: Mucus/secretions are present within several bronchi within both lower lobes  -c/w supportive care.     Problem/Plan - 3:  ·  Problem: Lung nodule. Plan: 0.5 cm solid nodule in the right lower lobe is unchanged when compared to previous exam  -outpatient followup for serial CTs      Problem/Plan - 4: HTN cont meds   monitor

## 2019-02-26 NOTE — PROGRESS NOTE ADULT - ASSESSMENT
ASSESSMENT:    persistent asthma/bronchiectasis exacerbation unresponsive to 2 courses of systemic steroids, inhaled steroids and long acting beta agonist nebulizers due to influenza and a coronaviral infection - "rule out" a superimposed bacterial infection    no pneumonia on CT    HTN/HLD    h/o sigmoid diverticulitis s/p Melinda's procedure and reversal of colostomy in 2011    PLAN/RECOMMENDATIONS:    oxygen supplementation as needed to keep saturation greater than 92%  solumedrol 40mg IVP q6h- decrease tomorrow if stable  albuterol/atrovent nebs q4h holding 2am dose  pulmicort nebs 0.5mg 2 times daily  singulair  GI/DVT prophylaxis  cozaar    Thank you for the courtesy of this referral. Plan of care discussed with the patient and her  at bedside and the ER staff.    Los Santos MD, [facility] - [phone]  Pulmonary Medicine ASSESSMENT:    persistent asthma/bronchiectasis exacerbation unresponsive to 2 courses of systemic steroids, inhaled steroids and long acting beta agonist nebulizers due to influenza and a coronaviral infection - "rule out" a superimposed bacterial infection    no pneumonia on CT    HTN/HLD    h/o sigmoid diverticulitis s/p Melinda's procedure and reversal of colostomy in 2011    PLAN/RECOMMENDATIONS:    oxygen supplementation as needed to keep saturation greater than 92%  solumedrol 40mg IVP q6h- decrease tomorrow if stable  albuterol/atrovent nebs q4h holding 2am dose  pulmicort nebs 0.5mg 2 times daily  singulair  GI/DVT prophylaxis  cozaar    Thank you for the courtesy of this referral. Plan of care discussed with the patient and her  at bedside and the ER staff.    Ifeoma Bell MD, [facility] - [phone]  Pulmonary Medicine

## 2019-02-26 NOTE — PHARMACOTHERAPY INTERVENTION NOTE - COMMENTS
Medication reconciliation performed. Patient was educated on new medications' name, indication, and side effects by pharmacy interns Fernando Brizuela and Ashley Tang.

## 2019-02-27 ENCOUNTER — APPOINTMENT (OUTPATIENT)
Dept: MAMMOGRAPHY | Facility: CLINIC | Age: 74
End: 2019-02-27

## 2019-02-27 ENCOUNTER — APPOINTMENT (OUTPATIENT)
Dept: ULTRASOUND IMAGING | Facility: CLINIC | Age: 74
End: 2019-02-27

## 2019-02-27 ENCOUNTER — APPOINTMENT (OUTPATIENT)
Dept: RADIOLOGY | Facility: CLINIC | Age: 74
End: 2019-02-27

## 2019-02-27 LAB
ANION GAP SERPL CALC-SCNC: 16 MMOL/L — SIGNIFICANT CHANGE UP (ref 5–17)
BASOPHILS # BLD AUTO: 0.01 K/UL — SIGNIFICANT CHANGE UP (ref 0–0.2)
BASOPHILS NFR BLD AUTO: 0.1 % — SIGNIFICANT CHANGE UP (ref 0–2)
BUN SERPL-MCNC: 19 MG/DL — SIGNIFICANT CHANGE UP (ref 7–23)
CALCIUM SERPL-MCNC: 9.3 MG/DL — SIGNIFICANT CHANGE UP (ref 8.4–10.5)
CHLORIDE SERPL-SCNC: 106 MMOL/L — SIGNIFICANT CHANGE UP (ref 96–108)
CO2 SERPL-SCNC: 22 MMOL/L — SIGNIFICANT CHANGE UP (ref 22–31)
CREAT SERPL-MCNC: 0.77 MG/DL — SIGNIFICANT CHANGE UP (ref 0.5–1.3)
EOSINOPHIL # BLD AUTO: 0 K/UL — SIGNIFICANT CHANGE UP (ref 0–0.5)
EOSINOPHIL NFR BLD AUTO: 0 % — SIGNIFICANT CHANGE UP (ref 0–6)
GLUCOSE BLDC GLUCOMTR-MCNC: 290 MG/DL — HIGH (ref 70–99)
GLUCOSE BLDC GLUCOMTR-MCNC: 339 MG/DL — HIGH (ref 70–99)
GLUCOSE BLDC GLUCOMTR-MCNC: 377 MG/DL — HIGH (ref 70–99)
GLUCOSE SERPL-MCNC: 253 MG/DL — HIGH (ref 70–99)
HCT VFR BLD CALC: 42.1 % — SIGNIFICANT CHANGE UP (ref 34.5–45)
HGB BLD-MCNC: 13.3 G/DL — SIGNIFICANT CHANGE UP (ref 11.5–15.5)
IMM GRANULOCYTES NFR BLD AUTO: 0.9 % — SIGNIFICANT CHANGE UP (ref 0–1.5)
LYMPHOCYTES # BLD AUTO: 0.76 K/UL — LOW (ref 1–3.3)
LYMPHOCYTES # BLD AUTO: 5.6 % — LOW (ref 13–44)
MCHC RBC-ENTMCNC: 27.5 PG — SIGNIFICANT CHANGE UP (ref 27–34)
MCHC RBC-ENTMCNC: 31.6 GM/DL — LOW (ref 32–36)
MCV RBC AUTO: 87 FL — SIGNIFICANT CHANGE UP (ref 80–100)
MONOCYTES # BLD AUTO: 0.94 K/UL — HIGH (ref 0–0.9)
MONOCYTES NFR BLD AUTO: 6.9 % — SIGNIFICANT CHANGE UP (ref 2–14)
NEUTROPHILS # BLD AUTO: 11.79 K/UL — HIGH (ref 1.8–7.4)
NEUTROPHILS NFR BLD AUTO: 86.5 % — HIGH (ref 43–77)
PLATELET # BLD AUTO: 283 K/UL — SIGNIFICANT CHANGE UP (ref 150–400)
POTASSIUM SERPL-MCNC: 4.5 MMOL/L — SIGNIFICANT CHANGE UP (ref 3.5–5.3)
POTASSIUM SERPL-SCNC: 4.5 MMOL/L — SIGNIFICANT CHANGE UP (ref 3.5–5.3)
RBC # BLD: 4.84 M/UL — SIGNIFICANT CHANGE UP (ref 3.8–5.2)
RBC # FLD: 14.5 % — SIGNIFICANT CHANGE UP (ref 10.3–14.5)
SODIUM SERPL-SCNC: 144 MMOL/L — SIGNIFICANT CHANGE UP (ref 135–145)
WBC # BLD: 13.62 K/UL — HIGH (ref 3.8–10.5)
WBC # FLD AUTO: 13.62 K/UL — HIGH (ref 3.8–10.5)

## 2019-02-27 RX ORDER — DEXTROSE 50 % IN WATER 50 %
12.5 SYRINGE (ML) INTRAVENOUS ONCE
Qty: 0 | Refills: 0 | Status: DISCONTINUED | OUTPATIENT
Start: 2019-02-27 | End: 2019-03-02

## 2019-02-27 RX ORDER — GLUCAGON INJECTION, SOLUTION 0.5 MG/.1ML
1 INJECTION, SOLUTION SUBCUTANEOUS ONCE
Qty: 0 | Refills: 0 | Status: DISCONTINUED | OUTPATIENT
Start: 2019-02-27 | End: 2019-03-02

## 2019-02-27 RX ORDER — INSULIN LISPRO 100/ML
VIAL (ML) SUBCUTANEOUS
Qty: 0 | Refills: 0 | Status: DISCONTINUED | OUTPATIENT
Start: 2019-02-27 | End: 2019-02-28

## 2019-02-27 RX ORDER — SODIUM CHLORIDE 9 MG/ML
1000 INJECTION, SOLUTION INTRAVENOUS
Qty: 0 | Refills: 0 | Status: DISCONTINUED | OUTPATIENT
Start: 2019-02-27 | End: 2019-03-02

## 2019-02-27 RX ORDER — DEXTROSE 50 % IN WATER 50 %
25 SYRINGE (ML) INTRAVENOUS ONCE
Qty: 0 | Refills: 0 | Status: DISCONTINUED | OUTPATIENT
Start: 2019-02-27 | End: 2019-03-02

## 2019-02-27 RX ORDER — DEXTROSE 50 % IN WATER 50 %
15 SYRINGE (ML) INTRAVENOUS ONCE
Qty: 0 | Refills: 0 | Status: DISCONTINUED | OUTPATIENT
Start: 2019-02-27 | End: 2019-03-02

## 2019-02-27 RX ORDER — INSULIN LISPRO 100/ML
VIAL (ML) SUBCUTANEOUS AT BEDTIME
Qty: 0 | Refills: 0 | Status: DISCONTINUED | OUTPATIENT
Start: 2019-02-27 | End: 2019-02-28

## 2019-02-27 RX ADMIN — Medication 3 MILLILITER(S): at 17:21

## 2019-02-27 RX ADMIN — Medication 3 MILLILITER(S): at 09:47

## 2019-02-27 RX ADMIN — Medication 5: at 12:14

## 2019-02-27 RX ADMIN — Medication 3 MILLILITER(S): at 22:28

## 2019-02-27 RX ADMIN — Medication 200 MILLIGRAM(S): at 12:23

## 2019-02-27 RX ADMIN — Medication 3 MILLILITER(S): at 05:48

## 2019-02-27 RX ADMIN — Medication 75 MILLIGRAM(S): at 05:48

## 2019-02-27 RX ADMIN — Medication 200 MILLIGRAM(S): at 05:51

## 2019-02-27 RX ADMIN — ENOXAPARIN SODIUM 40 MILLIGRAM(S): 100 INJECTION SUBCUTANEOUS at 12:16

## 2019-02-27 RX ADMIN — Medication 3 MILLILITER(S): at 13:46

## 2019-02-27 RX ADMIN — Medication 200 MILLIGRAM(S): at 22:28

## 2019-02-27 RX ADMIN — MONTELUKAST 10 MILLIGRAM(S): 4 TABLET, CHEWABLE ORAL at 12:16

## 2019-02-27 RX ADMIN — Medication 0.5 MILLIGRAM(S): at 05:48

## 2019-02-27 RX ADMIN — LOSARTAN POTASSIUM 50 MILLIGRAM(S): 100 TABLET, FILM COATED ORAL at 05:48

## 2019-02-27 RX ADMIN — Medication 40 MILLIGRAM(S): at 12:23

## 2019-02-27 RX ADMIN — Medication 75 MILLIGRAM(S): at 17:22

## 2019-02-27 RX ADMIN — Medication 0.5 MILLIGRAM(S): at 17:21

## 2019-02-27 RX ADMIN — Medication 2: at 22:28

## 2019-02-27 RX ADMIN — Medication 3: at 17:19

## 2019-02-27 RX ADMIN — Medication 40 MILLIGRAM(S): at 22:29

## 2019-02-27 RX ADMIN — Medication 40 MILLIGRAM(S): at 05:48

## 2019-02-27 NOTE — PROGRESS NOTE ADULT - ASSESSMENT
75 y/o F with PMH of Bronchiectasis, Asthma, and HTN p/w  worsening shortness of breath, wheezing, productive yellow cough unresponsive to o/p steroids and nebs .     Problem/Plan - 1:  ·  Problem: Exacerbation of persistent asthma,/ bronchiectasis Plan: asthma exacerbation likely viral induced as CT chest negative for PNA  RVP positive for corona virus/ /influenza   -c/w albuterol/atrovent nebs q4h holding 2am dose  -c/w Pulmicort nebs 0.5mg 2 times daily  -c/w singular 10mg daily  -c/w supplemental 02, goal 02> 92%, monitor.   -off abx  : no pna      f/u sputum culture     Problem/Plan - 2:  ·  Problem: Bronchiectasis.  Plan: Mucus/secretions are present within several bronchi within both lower lobes  -c/w supportive care.     Problem/Plan - 3:  ·  Problem: Lung nodule. Plan: 0.5 cm solid nodule in the right lower lobe is unchanged when compared to previous exam  -outpatient followup for serial CTs      Problem/Plan - 4: HTN cont meds   monitor

## 2019-02-27 NOTE — PROGRESS NOTE ADULT - ASSESSMENT
ASSESSMENT:    persistent asthma/bronchiectasis exacerbation unresponsive to 2 courses of systemic steroids, inhaled steroids and long acting beta agonist nebulizers due to influenza and a coronaviral infection - "rule out" a superimposed bacterial infection    no pneumonia on CT    HTN/HLD    h/o sigmoid diverticulitis s/p Melinda's procedure and reversal of colostomy in 2011    PLAN/RECOMMENDATIONS:    oxygen supplementation as needed to keep saturation greater than 92%  decrease solumedrol to 40 q8  discontinue levaquin.  no evidence bacterial infection  albuterol/atrovent nebs q4h holding 2am dose  pulmicort nebs 0.5mg 2 times daily  singulair  GI/DVT prophylaxis  cozaar    Thank you for the courtesy of this referral. Plan of care discussed with the patient and her  at bedside and the ER staff.    Ifeoma Bell MD, [facility] - [phone]  Pulmonary Medicine

## 2019-02-28 DIAGNOSIS — R73.9 HYPERGLYCEMIA, UNSPECIFIED: ICD-10-CM

## 2019-02-28 DIAGNOSIS — Z86.79 PERSONAL HISTORY OF OTHER DISEASES OF THE CIRCULATORY SYSTEM: ICD-10-CM

## 2019-02-28 LAB
ANION GAP SERPL CALC-SCNC: 15 MMOL/L — SIGNIFICANT CHANGE UP (ref 5–17)
BUN SERPL-MCNC: 23 MG/DL — SIGNIFICANT CHANGE UP (ref 7–23)
CALCIUM SERPL-MCNC: 9.1 MG/DL — SIGNIFICANT CHANGE UP (ref 8.4–10.5)
CHLORIDE SERPL-SCNC: 103 MMOL/L — SIGNIFICANT CHANGE UP (ref 96–108)
CO2 SERPL-SCNC: 24 MMOL/L — SIGNIFICANT CHANGE UP (ref 22–31)
CREAT SERPL-MCNC: 0.73 MG/DL — SIGNIFICANT CHANGE UP (ref 0.5–1.3)
GLUCOSE BLDC GLUCOMTR-MCNC: 123 MG/DL — HIGH (ref 70–99)
GLUCOSE BLDC GLUCOMTR-MCNC: 151 MG/DL — HIGH (ref 70–99)
GLUCOSE BLDC GLUCOMTR-MCNC: 239 MG/DL — HIGH (ref 70–99)
GLUCOSE BLDC GLUCOMTR-MCNC: 258 MG/DL — HIGH (ref 70–99)
GLUCOSE SERPL-MCNC: 287 MG/DL — HIGH (ref 70–99)
GRAM STN FLD: SIGNIFICANT CHANGE UP
HBA1C BLD-MCNC: 7.4 % — HIGH (ref 4–5.6)
HCT VFR BLD CALC: 41.5 % — SIGNIFICANT CHANGE UP (ref 34.5–45)
HGB BLD-MCNC: 13.2 G/DL — SIGNIFICANT CHANGE UP (ref 11.5–15.5)
MCHC RBC-ENTMCNC: 27.5 PG — SIGNIFICANT CHANGE UP (ref 27–34)
MCHC RBC-ENTMCNC: 31.8 GM/DL — LOW (ref 32–36)
MCV RBC AUTO: 86.5 FL — SIGNIFICANT CHANGE UP (ref 80–100)
PLATELET # BLD AUTO: 297 K/UL — SIGNIFICANT CHANGE UP (ref 150–400)
POTASSIUM SERPL-MCNC: 4.6 MMOL/L — SIGNIFICANT CHANGE UP (ref 3.5–5.3)
POTASSIUM SERPL-SCNC: 4.6 MMOL/L — SIGNIFICANT CHANGE UP (ref 3.5–5.3)
RBC # BLD: 4.8 M/UL — SIGNIFICANT CHANGE UP (ref 3.8–5.2)
RBC # FLD: 14.2 % — SIGNIFICANT CHANGE UP (ref 10.3–14.5)
SODIUM SERPL-SCNC: 142 MMOL/L — SIGNIFICANT CHANGE UP (ref 135–145)
SPECIMEN SOURCE: SIGNIFICANT CHANGE UP
WBC # BLD: 11.96 K/UL — HIGH (ref 3.8–10.5)
WBC # FLD AUTO: 11.96 K/UL — HIGH (ref 3.8–10.5)

## 2019-02-28 RX ORDER — INSULIN LISPRO 100/ML
10 VIAL (ML) SUBCUTANEOUS
Qty: 0 | Refills: 0 | Status: DISCONTINUED | OUTPATIENT
Start: 2019-02-28 | End: 2019-03-02

## 2019-02-28 RX ORDER — INSULIN GLARGINE 100 [IU]/ML
25 INJECTION, SOLUTION SUBCUTANEOUS AT BEDTIME
Qty: 0 | Refills: 0 | Status: DISCONTINUED | OUTPATIENT
Start: 2019-02-28 | End: 2019-03-02

## 2019-02-28 RX ORDER — INSULIN GLARGINE 100 [IU]/ML
9 INJECTION, SOLUTION SUBCUTANEOUS AT BEDTIME
Qty: 0 | Refills: 0 | Status: DISCONTINUED | OUTPATIENT
Start: 2019-02-28 | End: 2019-02-28

## 2019-02-28 RX ORDER — INSULIN GLARGINE 100 [IU]/ML
13 INJECTION, SOLUTION SUBCUTANEOUS AT BEDTIME
Qty: 0 | Refills: 0 | Status: DISCONTINUED | OUTPATIENT
Start: 2019-02-28 | End: 2019-02-28

## 2019-02-28 RX ORDER — INSULIN LISPRO 100/ML
VIAL (ML) SUBCUTANEOUS
Qty: 0 | Refills: 0 | Status: DISCONTINUED | OUTPATIENT
Start: 2019-02-28 | End: 2019-02-28

## 2019-02-28 RX ORDER — INSULIN LISPRO 100/ML
VIAL (ML) SUBCUTANEOUS AT BEDTIME
Qty: 0 | Refills: 0 | Status: DISCONTINUED | OUTPATIENT
Start: 2019-02-28 | End: 2019-03-02

## 2019-02-28 RX ORDER — INSULIN LISPRO 100/ML
9 VIAL (ML) SUBCUTANEOUS
Qty: 0 | Refills: 0 | Status: DISCONTINUED | OUTPATIENT
Start: 2019-02-28 | End: 2019-02-28

## 2019-02-28 RX ORDER — INSULIN LISPRO 100/ML
VIAL (ML) SUBCUTANEOUS
Qty: 0 | Refills: 0 | Status: DISCONTINUED | OUTPATIENT
Start: 2019-02-28 | End: 2019-03-02

## 2019-02-28 RX ORDER — INSULIN LISPRO 100/ML
VIAL (ML) SUBCUTANEOUS AT BEDTIME
Qty: 0 | Refills: 0 | Status: DISCONTINUED | OUTPATIENT
Start: 2019-02-28 | End: 2019-02-28

## 2019-02-28 RX ADMIN — Medication 0.5 MILLIGRAM(S): at 17:35

## 2019-02-28 RX ADMIN — Medication 200 MILLIGRAM(S): at 16:51

## 2019-02-28 RX ADMIN — Medication 3: at 08:26

## 2019-02-28 RX ADMIN — Medication 0.5 MILLIGRAM(S): at 05:10

## 2019-02-28 RX ADMIN — Medication 20 MILLIGRAM(S): at 15:51

## 2019-02-28 RX ADMIN — INSULIN GLARGINE 25 UNIT(S): 100 INJECTION, SOLUTION SUBCUTANEOUS at 22:06

## 2019-02-28 RX ADMIN — Medication 9 UNIT(S): at 12:26

## 2019-02-28 RX ADMIN — Medication 20 MILLIGRAM(S): at 22:01

## 2019-02-28 RX ADMIN — Medication 3 MILLILITER(S): at 05:11

## 2019-02-28 RX ADMIN — Medication 200 MILLIGRAM(S): at 22:01

## 2019-02-28 RX ADMIN — Medication 3 MILLILITER(S): at 10:08

## 2019-02-28 RX ADMIN — ENOXAPARIN SODIUM 40 MILLIGRAM(S): 100 INJECTION SUBCUTANEOUS at 12:18

## 2019-02-28 RX ADMIN — Medication 75 MILLIGRAM(S): at 05:10

## 2019-02-28 RX ADMIN — LOSARTAN POTASSIUM 50 MILLIGRAM(S): 100 TABLET, FILM COATED ORAL at 05:10

## 2019-02-28 RX ADMIN — Medication 40 MILLIGRAM(S): at 05:11

## 2019-02-28 RX ADMIN — Medication 3 MILLILITER(S): at 15:51

## 2019-02-28 RX ADMIN — Medication 3 MILLILITER(S): at 17:34

## 2019-02-28 RX ADMIN — Medication 75 MILLIGRAM(S): at 17:35

## 2019-02-28 RX ADMIN — Medication 4: at 12:25

## 2019-02-28 RX ADMIN — Medication 3 MILLILITER(S): at 22:01

## 2019-02-28 RX ADMIN — Medication 200 MILLIGRAM(S): at 05:11

## 2019-02-28 RX ADMIN — Medication 10 UNIT(S): at 17:34

## 2019-02-28 RX ADMIN — MONTELUKAST 10 MILLIGRAM(S): 4 TABLET, CHEWABLE ORAL at 12:17

## 2019-02-28 NOTE — CONSULT NOTE ADULT - SUBJECTIVE AND OBJECTIVE BOX
HPI:  75 y/o F with PMH of Bronchiectasis, Asthma, and HTN p/w 3 days of worsening shortness of breath, wheezing, productive yellow cough. Endorses chills but no fever/rigors. Patient has had URI symptoms and post nasal drip for the past few weeks and got 2 courses of po steroids as outpatient for urgent care and saw her outside pulmonologist who added singular which did help with symptoms. Denies hx of clots, LE swelling or pain, chest pain, n/v, abdominal pain.  endorses URI symptoms last weeks    ED vitals: 98.6, 103, 134/74, 26, 94% on RA  In ER was given 3L fluids, duoneb, solumedrol (25 Feb 2019 16:53)  Patient has no history of diabetes, not on any DM meds at home,  no polyuria polydipsia. Patient follows up with PCP for health management.    PAST MEDICAL & SURGICAL HISTORY:  Diverticulitis of colon (without mention of hemorrhage)  Pneumonia  2011  Antibody Deficiency Syndrome  Hypercholesterolemia  History of Hypertension  Asthma  /Chronic brochitiss  colostomy 1/2011: reversal 8/11  History of Dilatation and Curettage      FAMILY HISTORY:  No pertinent family history in first degree relatives      Social History:    Outpatient Medications:    MEDICATIONS  (STANDING):  ALBUTerol/ipratropium for Nebulization 3 milliLiter(s) Nebulizer <User Schedule>  benzonatate 200 milliGRAM(s) Oral three times a day  buDESOnide   0.5 milliGRAM(s) Respule 0.5 milliGRAM(s) Inhalation every 12 hours  dextrose 5%. 1000 milliLiter(s) (50 mL/Hr) IV Continuous <Continuous>  dextrose 50% Injectable 12.5 Gram(s) IV Push once  dextrose 50% Injectable 25 Gram(s) IV Push once  dextrose 50% Injectable 25 Gram(s) IV Push once  enoxaparin Injectable 40 milliGRAM(s) SubCutaneous daily  guaiFENesin/dextromethorphan  Syrup 10 milliLiter(s) Oral four times a day  insulin glargine Injectable (LANTUS) 13 Unit(s) SubCutaneous at bedtime  insulin lispro (HumaLOG) corrective regimen sliding scale   SubCutaneous three times a day before meals  insulin lispro (HumaLOG) corrective regimen sliding scale   SubCutaneous at bedtime  insulin lispro Injectable (HumaLOG) 9 Unit(s) SubCutaneous three times a day before meals  losartan 50 milliGRAM(s) Oral daily  methylPREDNISolone sodium succinate Injectable 20 milliGRAM(s) IV Push every 8 hours  montelukast 10 milliGRAM(s) Oral daily  oseltamivir 75 milliGRAM(s) Oral two times a day    MEDICATIONS  (PRN):  dextrose 40% Gel 15 Gram(s) Oral once PRN Blood Glucose LESS THAN 70 milliGRAM(s)/deciliter  glucagon  Injectable 1 milliGRAM(s) IntraMuscular once PRN Glucose LESS THAN 70 milligrams/deciliter      Allergies    Bactrim (Short breath)  penicillins (Rash)  Zyflo (Stomach Upset; Vomiting)    Intolerances      Review of Systems:  Constitutional: No fever, no chills  Eyes: No blurry vision  Neuro: No tremors  HEENT: No pain, no neck swelling  Cardiovascular: No chest pain, no palpitations  Respiratory: Has SOB, no cough  GI: No nausea, vomiting, abdominal pain  : No dysuria  Skin: no rash  MSK: Has leg swelling.  Psych: no depression  Endocrine: no polyuria, polydipsia    ALL OTHER SYSTEMS REVIEWED AND NEGATIVE    UNABLE TO OBTAIN    PHYSICAL EXAM:  VITALS: T(C): 36.4 (02-28-19 @ 12:00)  T(F): 97.5 (02-28-19 @ 12:00), Max: 98.1 (02-28-19 @ 00:29)  HR: 61 (02-28-19 @ 12:00) (61 - 78)  BP: 129/76 (02-28-19 @ 12:00) (120/74 - 155/78)  RR:  (18 - 18)  SpO2:  (92% - 98%)  Wt(kg): --  GENERAL: NAD, well-groomed, well-developed  EYES: No proptosis, no lid lag  HEENT:  Atraumatic, Normocephalic  THYROID: Normal size, no palpable nodules  RESPIRATORY: Clear to auscultation bilaterally; No rales, rhonchi, wheezing  CARDIOVASCULAR: Si S2, No murmurs;  GI: Soft, non distended, normal bowel sounds  SKIN: Dry, intact, No rashes or lesions  MUSCULOSKELETAL: Has BL lower extremity edema.  NEURO:  no tremor, sensation decreased in feet BL,    POCT Blood Glucose.: 239 mg/dL (02-28-19 @ 12:14)  POCT Blood Glucose.: 258 mg/dL (02-28-19 @ 07:48)  POCT Blood Glucose.: 339 mg/dL (02-27-19 @ 21:35)  POCT Blood Glucose.: 290 mg/dL (02-27-19 @ 16:36)  POCT Blood Glucose.: 377 mg/dL (02-27-19 @ 11:51)                            13.2   11.96 )-----------( 297      ( 28 Feb 2019 09:39 )             41.5       02-28    142  |  103  |  23  ----------------------------<  287<H>  4.6   |  24  |  0.73    EGFR if : 95  EGFR if non : 82    Ca    9.1      02-28        Thyroid Function Tests:      Hemoglobin A1C, Whole Blood: 7.4 % <H> [4.0 - 5.6] (02-28-19 @ 09:39)          Radiology:

## 2019-02-28 NOTE — CONSULT NOTE ADULT - PROBLEM SELECTOR RECOMMENDATION 9
Will increase Lantus to 25 units at bed time.  Will increase Humalog to 10 units before each meal in addition to Humalog correction scale coverage.  Patient counseled for compliance with consistent low carb diet.

## 2019-02-28 NOTE — CONSULT NOTE ADULT - ASSESSMENT
Assessment  DMT2: 74y Female with newly dx DM T2 with hyperglycemia, blood sugars running high, on basal bolus insulin, on high dose steroids, no hypoglycemic episode,  eating meals, compliant with low carb diet.  Asthma exacerbation: On medications, no chest pain, stable, monitored.  HTN: Controlled, no headaches, on medications.          Allyssa Sung MD  Cell: [phone]  Office: [phone]

## 2019-02-28 NOTE — PROGRESS NOTE ADULT - ASSESSMENT
ASSESSMENT:    persistent asthma/bronchiectasis exacerbation unresponsive to 2 courses of systemic steroids, inhaled steroids and long acting beta agonist nebulizers due to influenza and a coronaviral infection - "rule out" a superimposed bacterial infection    no pneumonia on CT    HTN/HLD    h/o sigmoid diverticulitis s/p Melinda's procedure and reversal of colostomy in 2011    PLAN/RECOMMENDATIONS:    oxygen supplementation as needed to keep saturation greater than 92%  decrease solumedrol to 20 q8  discontinue levaquin.  no evidence bacterial infection  albuterol/atrovent nebs q4h holding 2am dose  pulmicort nebs 0.5mg 2 times daily  singulair  GI/DVT prophylaxis  cozaar  monitor glucose  increase activity  antitussives  discussed with pt    Ifeoma Bell MD, [facility] - [phone]  Pulmonary Medicine

## 2019-02-28 NOTE — PROGRESS NOTE ADULT - ASSESSMENT
73 y/o F with PMH of Bronchiectasis, Asthma, and HTN p/w  worsening shortness of breath, wheezing, productive yellow cough unresponsive to o/p steroids and nebs .     Problem/Plan - 1:  ·  Problem: Exacerbation of persistent asthma,/ bronchiectasis Plan: asthma exacerbation in setting of influenza /corona virus   CT chest negative for PNA  RVP positive for corona virus/ /influenza   -c/w albuterol/atrovent nebs q4h holding 2am dose  -c/w Pulmicort nebs 0.5mg 2 times daily  -c/w singular 10mg daily  -c/w supplemental 02, goal 02> 92%, monitor.   -off abx  : no pna      f/u sputum culture : sent  complete tamifu    steroid taper per pul     Problem/Plan - 2:  ·  Problem: Bronchiectasis.  Plan: Mucus/secretions are present within several bronchi within both lower lobes  -c/w supportive care.     Problem/Plan - 3:  ·  Problem: Lung nodule. Plan: 0.5 cm solid nodule in the right lower lobe is unchanged when compared to previous exam  -outpatient followup for serial CTs      Problem/Plan - 4: HTN cont meds   monitor

## 2019-03-01 ENCOUNTER — TRANSCRIPTION ENCOUNTER (OUTPATIENT)
Age: 74
End: 2019-03-01

## 2019-03-01 LAB
ANION GAP SERPL CALC-SCNC: 12 MMOL/L — SIGNIFICANT CHANGE UP (ref 5–17)
BUN SERPL-MCNC: 24 MG/DL — HIGH (ref 7–23)
CALCIUM SERPL-MCNC: 8.8 MG/DL — SIGNIFICANT CHANGE UP (ref 8.4–10.5)
CHLORIDE SERPL-SCNC: 101 MMOL/L — SIGNIFICANT CHANGE UP (ref 96–108)
CO2 SERPL-SCNC: 25 MMOL/L — SIGNIFICANT CHANGE UP (ref 22–31)
CREAT SERPL-MCNC: 0.75 MG/DL — SIGNIFICANT CHANGE UP (ref 0.5–1.3)
GLUCOSE BLDC GLUCOMTR-MCNC: 103 MG/DL — HIGH (ref 70–99)
GLUCOSE BLDC GLUCOMTR-MCNC: 181 MG/DL — HIGH (ref 70–99)
GLUCOSE BLDC GLUCOMTR-MCNC: 181 MG/DL — HIGH (ref 70–99)
GLUCOSE BLDC GLUCOMTR-MCNC: 264 MG/DL — HIGH (ref 70–99)
GLUCOSE SERPL-MCNC: 230 MG/DL — HIGH (ref 70–99)
HCT VFR BLD CALC: 38.3 % — SIGNIFICANT CHANGE UP (ref 34.5–45)
HGB BLD-MCNC: 12.4 G/DL — SIGNIFICANT CHANGE UP (ref 11.5–15.5)
MCHC RBC-ENTMCNC: 27.7 PG — SIGNIFICANT CHANGE UP (ref 27–34)
MCHC RBC-ENTMCNC: 32.4 GM/DL — SIGNIFICANT CHANGE UP (ref 32–36)
MCV RBC AUTO: 85.7 FL — SIGNIFICANT CHANGE UP (ref 80–100)
PLATELET # BLD AUTO: 254 K/UL — SIGNIFICANT CHANGE UP (ref 150–400)
POTASSIUM SERPL-MCNC: 4.5 MMOL/L — SIGNIFICANT CHANGE UP (ref 3.5–5.3)
POTASSIUM SERPL-SCNC: 4.5 MMOL/L — SIGNIFICANT CHANGE UP (ref 3.5–5.3)
RBC # BLD: 4.47 M/UL — SIGNIFICANT CHANGE UP (ref 3.8–5.2)
RBC # FLD: 14.1 % — SIGNIFICANT CHANGE UP (ref 10.3–14.5)
SODIUM SERPL-SCNC: 138 MMOL/L — SIGNIFICANT CHANGE UP (ref 135–145)
WBC # BLD: 7.01 K/UL — SIGNIFICANT CHANGE UP (ref 3.8–10.5)
WBC # FLD AUTO: 7.01 K/UL — SIGNIFICANT CHANGE UP (ref 3.8–10.5)

## 2019-03-01 RX ORDER — GUAIFENESIN/DEXTROMETHORPHAN 600MG-30MG
10 TABLET, EXTENDED RELEASE 12 HR ORAL
Qty: 100 | Refills: 0
Start: 2019-03-01 | End: 2019-03-15

## 2019-03-01 RX ORDER — MONTELUKAST 4 MG/1
1 TABLET, CHEWABLE ORAL
Qty: 30 | Refills: 0
Start: 2019-03-01 | End: 2019-03-30

## 2019-03-01 RX ADMIN — Medication 1: at 08:04

## 2019-03-01 RX ADMIN — Medication 20 MILLIGRAM(S): at 06:05

## 2019-03-01 RX ADMIN — Medication 75 MILLIGRAM(S): at 17:15

## 2019-03-01 RX ADMIN — Medication 3 MILLILITER(S): at 13:11

## 2019-03-01 RX ADMIN — Medication 200 MILLIGRAM(S): at 06:05

## 2019-03-01 RX ADMIN — Medication 200 MILLIGRAM(S): at 13:11

## 2019-03-01 RX ADMIN — Medication 10 UNIT(S): at 08:04

## 2019-03-01 RX ADMIN — MONTELUKAST 10 MILLIGRAM(S): 4 TABLET, CHEWABLE ORAL at 12:16

## 2019-03-01 RX ADMIN — Medication 3 MILLILITER(S): at 17:14

## 2019-03-01 RX ADMIN — Medication 3 MILLILITER(S): at 22:40

## 2019-03-01 RX ADMIN — Medication 20 MILLIGRAM(S): at 22:40

## 2019-03-01 RX ADMIN — Medication 10 UNIT(S): at 12:17

## 2019-03-01 RX ADMIN — Medication 3 MILLILITER(S): at 06:05

## 2019-03-01 RX ADMIN — Medication 3 MILLILITER(S): at 09:00

## 2019-03-01 RX ADMIN — Medication 10 UNIT(S): at 17:15

## 2019-03-01 RX ADMIN — INSULIN GLARGINE 25 UNIT(S): 100 INJECTION, SOLUTION SUBCUTANEOUS at 22:40

## 2019-03-01 RX ADMIN — Medication 3: at 17:15

## 2019-03-01 RX ADMIN — Medication 0.5 MILLIGRAM(S): at 06:05

## 2019-03-01 RX ADMIN — Medication 0.5 MILLIGRAM(S): at 17:14

## 2019-03-01 RX ADMIN — Medication 75 MILLIGRAM(S): at 06:05

## 2019-03-01 RX ADMIN — Medication 20 MILLIGRAM(S): at 13:11

## 2019-03-01 RX ADMIN — ENOXAPARIN SODIUM 40 MILLIGRAM(S): 100 INJECTION SUBCUTANEOUS at 12:17

## 2019-03-01 RX ADMIN — Medication 200 MILLIGRAM(S): at 22:40

## 2019-03-01 RX ADMIN — LOSARTAN POTASSIUM 50 MILLIGRAM(S): 100 TABLET, FILM COATED ORAL at 06:05

## 2019-03-01 NOTE — PROGRESS NOTE ADULT - ASSESSMENT
Assessment  DMT2: 74y Female with newly dx DM T2 with hyperglycemia, blood sugars improving, on insulin, no hypoglycemic episode,  eating meals, compliant with low carb diet.  Asthma exacerbation: On medications, no chest pain, stable, monitored.  HTN: Controlled, no headaches, on medications.          Allyssa Sung MD  Cell: [phone]  Office: [phone]

## 2019-03-01 NOTE — DISCHARGE NOTE ADULT - MEDICATION SUMMARY - MEDICATIONS TO TAKE
I will START or STAY ON the medications listed below when I get home from the hospital:    strips  -- 1   2 times a day   -- Indication: For Hyperglycemia, drug-induced    lancet  -- 1   2 times a day   -- Indication: For Hyperglycemia, drug-induced    alcohol  -- 1   2 times a day   -- Indication: For Hyperglycemia, drug-induced    glucometer  -- 1   once a day   -- Indication: For Hyperglycemia, drug-induced    budesonide 0.5 mg/2 mL inhalation suspension  --  inhaled twice daily  -- Indication: For Asthma exacerbation    predniSONE 10 mg oral tablet  -- 4 tab(s) by mouth once 3 daydays 3/3-3/5, 3 tabsX 3 days 3/6-3/8, 2 tabs X 3 days 3/9-3/11,1 tab3/12-  -- It is very important that you take or use this exactly as directed.  Do not skip doses or discontinue unless directed by your doctor.  Obtain medical advice before taking any non-prescription drugs as some may affect the action of this medication.  Take with food or milk.    -- Indication: For Asthma exacerbation    losartan 50 mg oral tablet  -- 1 tab(s) by mouth once a day  -- Indication: For Hypertension    Janumet 50 mg-1000 mg oral tablet  -- 1 tab(s) by mouth 2 times a day   -- Do not drink alcoholic beverages when taking this medication.  Take with food or milk.    -- Indication: For cough    benzonatate 100 mg oral capsule  -- 2 cap(s) by mouth 3 times a day  -- Indication: For cough    Brovana 15 mcg/2 mL inhalation solution  -- 2 milliliter(s) inhaled 2 times a day  -- Indication: For Asthma exacerbation    montelukast 10 mg oral tablet  -- 1 tab(s) by mouth once a day  -- Indication: For Asthma exacerbation    guaifenesin-dextromethorphan 100 mg-10 mg/5 mL oral liquid  -- 10 milliliter(s) by mouth 4 times a day  -- Indication: For cough

## 2019-03-01 NOTE — PROGRESS NOTE ADULT - ASSESSMENT
75 y/o F with PMH of Bronchiectasis, Asthma, and HTN p/w  worsening shortness of breath, wheezing, productive yellow cough unresponsive to o/p steroids and nebs .     # Exacerbation of persistent asthma,/ bronchiectasis   asthma exacerbation in setting of influenza /corona virus   CT chest negative for PNA  RVP positive for corona virus/ /influenza   -c/w albuterol/atrovent nebs q4h holding 2am dose  -c/w Pulmicort nebs 0.5mg 2 times daily  -c/w singular 10mg daily  -c/w supplemental 02, goal 02> 92%, monitor.   -off abx  : no pna      f/u sputum culture : sent  complete tamifu    steroid taper per pul     #Bronchiectasis.  Plan: Mucus/secretions are present within several bronchi within both lower lobes  -c/w supportive care.     # Lung nodule. Plan: 0.5 cm solid nodule in the right lower lobe is unchanged when compared to previous exam  -outpatient followup for serial CTs      # HTN cont meds   monitor

## 2019-03-01 NOTE — DISCHARGE NOTE ADULT - HOSPITAL COURSE
73 y/o F with PMH of Bronchiectasis, Asthma, and HTN p/w  worsening shortness of breath, wheezing, productive yellow cough unresponsive to o/p steroids and nebs .   Exacerbation of persistent asthma,/ bronchiectasis Plan: asthma exacerbation in setting of influenza /corona virus   CT chest negative for PNA  RVP positive for corona virus/ /influenza   -c/w albuterol/atrovent nebs q4h holding 2am dose  -c/w Pulmicort nebs 0.5mg 2 times daily  -c/w singular 10mg daily  -c/w supplemental 02, goal 02> 92%, monitor.   -off abx  : no pna      f/u sputum culture : sent  complete tamifu    steroid taper per pul   Bronchiectasis. Mucus/secretions are present within several bronchi within both lower lobes  -c/w supportive care.   Lung nodule. 0.5 cm solid nodule in the right lower lobe is unchanged when compared to previous exam  -outpatient followup for serial CTs    Discharge with outpatient follow-up

## 2019-03-01 NOTE — DISCHARGE NOTE ADULT - PATIENT PORTAL LINK FT
You can access the [facility], offered by [facility], by registering with the following website: [email or web address]

## 2019-03-01 NOTE — PROGRESS NOTE ADULT - ASSESSMENT
ASSESSMENT:    persistent asthma/bronchiectasis exacerbation unresponsive to 2 courses of systemic steroids, inhaled steroids and long acting beta agonist nebulizers due to influenza and a coronaviral infection - "rule out" a superimposed bacterial infection    no pneumonia on CT    HTN/HLD    h/o sigmoid diverticulitis s/p Melinda's procedure and reversal of colostomy in 2011    PLAN/RECOMMENDATIONS:    oxygen supplementation as needed to keep saturation greater than 92%   solumedrol to 20 q8- more wheezing today, but pt looks ok and want to avoid excessive hyperglycemia.  continue this dose  albuterol/atrovent nebs  pulmicort nebs 0.5mg 2 times daily  singulair  GI/DVT prophylaxis  cozaar  monitor glucose  increase activity  antitussives  it pt remains stable, would aim for dc tomorrow on prednisone 60 qd with decrease by 10 mg q 3 days  discussed with pt    Ifeoma Bell MD, [facility] - [phone]  Pulmonary Medicine

## 2019-03-01 NOTE — DISCHARGE NOTE ADULT - CARE PROVIDER_API CALL
Ifeoma Bell)  Critical Care Medicine; Internal Medicine; Pulmonary Disease  [address] [place] [address]  Phone: [phone]  Fax: [phone]  Follow Up Time:

## 2019-03-01 NOTE — DISCHARGE NOTE ADULT - PLAN OF CARE
improving Asthma attacks happen when the airways in the lungs become narrow and inflamed  Asthma symptoms can include - Wheezing or noisy breathing, coughing, tight feeling in the chest, shortness of breath  Almost everyone with asthma has a quick-relief inhaler that works in 5- 10mins that they carry with them and long-term controller medicines control asthma and prevent future symptoms. People with frequent asthma symptoms take these 1 or 2 times each day.  You can stay away from things that cause your symptoms or make them worse. Doctors call these "triggers."  avoid them as much as possible. Some common triggers include - Dust, mold, animals, such as dogs and cats, pollen and plants, cigarette smoke, getting sick with a cold or flu (that's why it's important to get a flu shot), stress  Talk to your caregiver about an action plan for managing asthma attacks. This includes the use of a peak flow meter which measures the severity of the attack and medicines that can help stop the attack.   SEEK MEDICAL CARE IF:  You have wheezing, shortness of breath, or a cough even if taking medicine to prevent attacks.   You have thickening of sputum, sputum changes from clear or white to yellow, green, gray, or bloody.   You have any problems that may be related to the medicines you are taking (such as a rash, itching, swelling, or trouble breathing).  You are using a reliever medicine more than 2–3 times per week.  Your peak flow is still at 50–79% of personal best after following your action plan for 1 hour.  SEEK IMMEDIATE MEDICAL CARE IF:  You are short of breath even at rest,or with very little physical activity, chest pain, heart beating fast, bluish color to your lips or fingernails, fever, dizziness,   You seem to be getting worse and are unresponsive to treatment during an asthma attack.   Your peak flow is less than 50% of personal best. Take medication as directed  Follow-up with your Pulmonary Doctor Take medications for your blood pressure as recommended.  Eat a heart healthy diet that is low in saturated fats and salt, and includes whole grains, fruits, vegetables and lean protein   Exercise regularly (consult with your physician or cardiologist first); maintain a heart healthy weight.   If you smoke - quit (A resource to help you stop smoking is the [facility] Tobacco Control – phone number [phone].). Continue to follow with your primary physician or cardiologist.   Seek medical help for dizziness, Lightheadedness, Blurry vision, Headache, Chest pain, Shortness of breath  Follow up with your medical doctor to establish long term blood pressure treatment goals. HgA1C this admission.  Make sure you get your HgA1c checked every three months.  If you take oral diabetes medications, check your blood glucose two times a day.  If you take insulin, check your blood glucose before meals and at bedtime.  It's important not to skip any meals.  Keep a log of your blood glucose results and always take it with you to your doctor appointments.  Keep a list of your current medications including injectables and over the counter medications and bring this medication list with you to all your doctor appointments.  If you have not seen your ophthalmologist this year call for appointment.  Check your feet daily for redness, sores, or openings. Do not self treat. If no improvement in two days call your primary care physician for an appointment.  Low blood sugar (hypoglycemia) is a blood sugar below 70mg/dl. Check your blood sugar if you feel signs/symptoms of hypoglycemia. If your blood sugar is below 70 take 15 grams of carbohydrates (ex 4 oz of apple juice, 3-4 glucose tablets, or 4-6 oz of regular soda) wait 15 minutes and repeat blood sugar to make sure it comes up above 70.  If your blood sugar is above 70 and you are due for a meal, have a meal.  If you are not due for a meal have a snack.  This snack helps keeps your blood sugar at a safe range. Follow-up with pulmonary

## 2019-03-01 NOTE — DISCHARGE NOTE ADULT - ADDITIONAL INSTRUCTIONS
Follow-up with your Primary Care Doctor within one week  Follow-up with your Pulmonologist within one week

## 2019-03-01 NOTE — DISCHARGE NOTE ADULT - CARE PLAN
Principal Discharge DX:	Asthma exacerbation  Goal:	improving  Assessment and plan of treatment:	Asthma attacks happen when the airways in the lungs become narrow and inflamed  Asthma symptoms can include - Wheezing or noisy breathing, coughing, tight feeling in the chest, shortness of breath  Almost everyone with asthma has a quick-relief inhaler that works in 5- 10mins that they carry with them and long-term controller medicines control asthma and prevent future symptoms. People with frequent asthma symptoms take these 1 or 2 times each day.  You can stay away from things that cause your symptoms or make them worse. Doctors call these "triggers."  avoid them as much as possible. Some common triggers include - Dust, mold, animals, such as dogs and cats, pollen and plants, cigarette smoke, getting sick with a cold or flu (that's why it's important to get a flu shot), stress  Talk to your caregiver about an action plan for managing asthma attacks. This includes the use of a peak flow meter which measures the severity of the attack and medicines that can help stop the attack.   SEEK MEDICAL CARE IF:  You have wheezing, shortness of breath, or a cough even if taking medicine to prevent attacks.   You have thickening of sputum, sputum changes from clear or white to yellow, green, gray, or bloody.   You have any problems that may be related to the medicines you are taking (such as a rash, itching, swelling, or trouble breathing).  You are using a reliever medicine more than 2–3 times per week.  Your peak flow is still at 50–79% of personal best after following your action plan for 1 hour.  SEEK IMMEDIATE MEDICAL CARE IF:  You are short of breath even at rest,or with very little physical activity, chest pain, heart beating fast, bluish color to your lips or fingernails, fever, dizziness,   You seem to be getting worse and are unresponsive to treatment during an asthma attack.   Your peak flow is less than 50% of personal best.  Secondary Diagnosis:	Bronchiectasis  Assessment and plan of treatment:	Take medication as directed  Follow-up with your Pulmonary Doctor  Secondary Diagnosis:	History of Hypertension  Assessment and plan of treatment:	Take medications for your blood pressure as recommended.  Eat a heart healthy diet that is low in saturated fats and salt, and includes whole grains, fruits, vegetables and lean protein   Exercise regularly (consult with your physician or cardiologist first); maintain a heart healthy weight.   If you smoke - quit (A resource to help you stop smoking is the [facility] Tobacco [facility] – phone number [phone].). Continue to follow with your primary physician or cardiologist.   Seek medical help for dizziness, Lightheadedness, Blurry vision, Headache, Chest pain, Shortness of breath  Follow up with your medical doctor to establish long term blood pressure treatment goals.  Secondary Diagnosis:	Hyperglycemia, drug-induced  Assessment and plan of treatment:	HgA1C this admission.  Make sure you get your HgA1c checked every three months.  If you take oral diabetes medications, check your blood glucose two times a day.  If you take insulin, check your blood glucose before meals and at bedtime.  It's important not to skip any meals.  Keep a log of your blood glucose results and always take it with you to your doctor appointments.  Keep a list of your current medications including injectables and over the counter medications and bring this medication list with you to all your doctor appointments.  If you have not seen your ophthalmologist this year call for appointment.  Check your feet daily for redness, sores, or openings. Do not self treat. If no improvement in two days call your primary care physician for an appointment.  Low blood sugar (hypoglycemia) is a blood sugar below 70mg/dl. Check your blood sugar if you feel signs/symptoms of hypoglycemia. If your blood sugar is below 70 take 15 grams of carbohydrates (ex 4 oz of apple juice, 3-4 glucose tablets, or 4-6 oz of regular soda) wait 15 minutes and repeat blood sugar to make sure it comes up above 70.  If your blood sugar is above 70 and you are due for a meal, have a meal.  If you are not due for a meal have a snack.  This snack helps keeps your blood sugar at a safe range.  Secondary Diagnosis:	Lung nodule  Assessment and plan of treatment:	Follow-up with pulmonary

## 2019-03-02 VITALS — HEART RATE: 78 BPM | SYSTOLIC BLOOD PRESSURE: 135 MMHG | DIASTOLIC BLOOD PRESSURE: 70 MMHG

## 2019-03-02 LAB
ANION GAP SERPL CALC-SCNC: 9 MMOL/L — SIGNIFICANT CHANGE UP (ref 5–17)
BUN SERPL-MCNC: 24 MG/DL — HIGH (ref 7–23)
CALCIUM SERPL-MCNC: 8.9 MG/DL — SIGNIFICANT CHANGE UP (ref 8.4–10.5)
CHLORIDE SERPL-SCNC: 102 MMOL/L — SIGNIFICANT CHANGE UP (ref 96–108)
CO2 SERPL-SCNC: 28 MMOL/L — SIGNIFICANT CHANGE UP (ref 22–31)
CREAT SERPL-MCNC: 0.71 MG/DL — SIGNIFICANT CHANGE UP (ref 0.5–1.3)
CULTURE RESULTS: SIGNIFICANT CHANGE UP
CULTURE RESULTS: SIGNIFICANT CHANGE UP
GLUCOSE BLDC GLUCOMTR-MCNC: 159 MG/DL — HIGH (ref 70–99)
GLUCOSE BLDC GLUCOMTR-MCNC: 229 MG/DL — HIGH (ref 70–99)
GLUCOSE BLDC GLUCOMTR-MCNC: 284 MG/DL — HIGH (ref 70–99)
GLUCOSE BLDC GLUCOMTR-MCNC: 88 MG/DL — SIGNIFICANT CHANGE UP (ref 70–99)
GLUCOSE SERPL-MCNC: 191 MG/DL — HIGH (ref 70–99)
HCT VFR BLD CALC: 40.8 % — SIGNIFICANT CHANGE UP (ref 34.5–45)
HGB BLD-MCNC: 13.4 G/DL — SIGNIFICANT CHANGE UP (ref 11.5–15.5)
MCHC RBC-ENTMCNC: 27.6 PG — SIGNIFICANT CHANGE UP (ref 27–34)
MCHC RBC-ENTMCNC: 32.8 GM/DL — SIGNIFICANT CHANGE UP (ref 32–36)
MCV RBC AUTO: 84 FL — SIGNIFICANT CHANGE UP (ref 80–100)
PLATELET # BLD AUTO: 263 K/UL — SIGNIFICANT CHANGE UP (ref 150–400)
POTASSIUM SERPL-MCNC: 4.6 MMOL/L — SIGNIFICANT CHANGE UP (ref 3.5–5.3)
POTASSIUM SERPL-SCNC: 4.6 MMOL/L — SIGNIFICANT CHANGE UP (ref 3.5–5.3)
RBC # BLD: 4.86 M/UL — SIGNIFICANT CHANGE UP (ref 3.8–5.2)
RBC # FLD: 13.7 % — SIGNIFICANT CHANGE UP (ref 10.3–14.5)
SODIUM SERPL-SCNC: 139 MMOL/L — SIGNIFICANT CHANGE UP (ref 135–145)
SPECIMEN SOURCE: SIGNIFICANT CHANGE UP
SPECIMEN SOURCE: SIGNIFICANT CHANGE UP
WBC # BLD: 8.9 K/UL — SIGNIFICANT CHANGE UP (ref 3.8–10.5)
WBC # FLD AUTO: 8.9 K/UL — SIGNIFICANT CHANGE UP (ref 3.8–10.5)

## 2019-03-02 PROCEDURE — 85379 FIBRIN DEGRADATION QUANT: CPT

## 2019-03-02 PROCEDURE — 83605 ASSAY OF LACTIC ACID: CPT

## 2019-03-02 PROCEDURE — 82803 BLOOD GASES ANY COMBINATION: CPT

## 2019-03-02 PROCEDURE — 83036 HEMOGLOBIN GLYCOSYLATED A1C: CPT

## 2019-03-02 PROCEDURE — 82947 ASSAY GLUCOSE BLOOD QUANT: CPT

## 2019-03-02 PROCEDURE — 87798 DETECT AGENT NOS DNA AMP: CPT

## 2019-03-02 PROCEDURE — 87581 M.PNEUMON DNA AMP PROBE: CPT

## 2019-03-02 PROCEDURE — 85730 THROMBOPLASTIN TIME PARTIAL: CPT

## 2019-03-02 PROCEDURE — 85014 HEMATOCRIT: CPT

## 2019-03-02 PROCEDURE — 94640 AIRWAY INHALATION TREATMENT: CPT

## 2019-03-02 PROCEDURE — 84295 ASSAY OF SERUM SODIUM: CPT

## 2019-03-02 PROCEDURE — 84132 ASSAY OF SERUM POTASSIUM: CPT

## 2019-03-02 PROCEDURE — 85027 COMPLETE CBC AUTOMATED: CPT

## 2019-03-02 PROCEDURE — 99285 EMERGENCY DEPT VISIT HI MDM: CPT | Mod: 25

## 2019-03-02 PROCEDURE — 82330 ASSAY OF CALCIUM: CPT

## 2019-03-02 PROCEDURE — 87633 RESP VIRUS 12-25 TARGETS: CPT

## 2019-03-02 PROCEDURE — 96374 THER/PROPH/DIAG INJ IV PUSH: CPT

## 2019-03-02 PROCEDURE — 93005 ELECTROCARDIOGRAM TRACING: CPT

## 2019-03-02 PROCEDURE — 71046 X-RAY EXAM CHEST 2 VIEWS: CPT

## 2019-03-02 PROCEDURE — 87040 BLOOD CULTURE FOR BACTERIA: CPT

## 2019-03-02 PROCEDURE — 82435 ASSAY OF BLOOD CHLORIDE: CPT

## 2019-03-02 PROCEDURE — 87486 CHLMYD PNEUM DNA AMP PROBE: CPT

## 2019-03-02 PROCEDURE — 80048 BASIC METABOLIC PNL TOTAL CA: CPT

## 2019-03-02 PROCEDURE — 87070 CULTURE OTHR SPECIMN AEROBIC: CPT

## 2019-03-02 PROCEDURE — 82962 GLUCOSE BLOOD TEST: CPT

## 2019-03-02 PROCEDURE — 85610 PROTHROMBIN TIME: CPT

## 2019-03-02 PROCEDURE — 81003 URINALYSIS AUTO W/O SCOPE: CPT

## 2019-03-02 PROCEDURE — 71250 CT THORAX DX C-: CPT

## 2019-03-02 PROCEDURE — 80053 COMPREHEN METABOLIC PANEL: CPT

## 2019-03-02 RX ORDER — SITAGLIPTIN AND METFORMIN HYDROCHLORIDE 500; 50 MG/1; MG/1
1 TABLET, FILM COATED ORAL
Qty: 60 | Refills: 0
Start: 2019-03-02 | End: 2019-03-31

## 2019-03-02 RX ADMIN — Medication 10 UNIT(S): at 09:50

## 2019-03-02 RX ADMIN — MONTELUKAST 10 MILLIGRAM(S): 4 TABLET, CHEWABLE ORAL at 12:36

## 2019-03-02 RX ADMIN — Medication 200 MILLIGRAM(S): at 06:26

## 2019-03-02 RX ADMIN — Medication 10 UNIT(S): at 17:16

## 2019-03-02 RX ADMIN — Medication 75 MILLIGRAM(S): at 06:27

## 2019-03-02 RX ADMIN — Medication 200 MILLIGRAM(S): at 12:36

## 2019-03-02 RX ADMIN — Medication 3 MILLILITER(S): at 12:49

## 2019-03-02 RX ADMIN — ENOXAPARIN SODIUM 40 MILLIGRAM(S): 100 INJECTION SUBCUTANEOUS at 12:36

## 2019-03-02 RX ADMIN — LOSARTAN POTASSIUM 50 MILLIGRAM(S): 100 TABLET, FILM COATED ORAL at 06:26

## 2019-03-02 RX ADMIN — Medication 20 MILLIGRAM(S): at 06:26

## 2019-03-02 RX ADMIN — Medication 2: at 12:35

## 2019-03-02 RX ADMIN — Medication 20 MILLIGRAM(S): at 12:36

## 2019-03-02 RX ADMIN — Medication 0.5 MILLIGRAM(S): at 17:16

## 2019-03-02 RX ADMIN — Medication 75 MILLIGRAM(S): at 17:16

## 2019-03-02 RX ADMIN — Medication 0.5 MILLIGRAM(S): at 06:26

## 2019-03-02 RX ADMIN — Medication 10 UNIT(S): at 12:35

## 2019-03-02 RX ADMIN — Medication 3: at 09:50

## 2019-03-02 RX ADMIN — Medication 3 MILLILITER(S): at 06:26

## 2019-03-02 RX ADMIN — Medication 40 MILLIGRAM(S): at 17:24

## 2019-03-02 RX ADMIN — Medication 3 MILLILITER(S): at 09:50

## 2019-03-02 RX ADMIN — Medication 3 MILLILITER(S): at 17:16

## 2019-03-02 NOTE — PROGRESS NOTE ADULT - ASSESSMENT
Assessment  DMT2: 74y Female with newly dx DM T2 with hyperglycemia, blood sugars improving, on insulin, on oxygen, no hypoglycemic episode,  eating meals, compliant with low carb diet.  Asthma exacerbation: On medications, no chest pain, stable, monitored.  HTN: Controlled, no headaches, on medications.          Allyssa Sung MD  Cell: [phone]  Office: [phone]

## 2019-03-02 NOTE — PROGRESS NOTE ADULT - PROVIDER SPECIALTY LIST ADULT
Endocrinology
Internal Medicine
Pulmonology
Internal Medicine
Endocrinology

## 2019-03-02 NOTE — PROGRESS NOTE ADULT - ASSESSMENT
ASSESSMENT:    persistent asthma/bronchiectasis exacerbation unresponsive to 2 courses of systemic steroids, inhaled steroids and long acting beta agonist nebulizers due to influenza and a coronaviral infection - "rule out" a superimposed bacterial infection    no pneumonia on CT    HTN/HLD    h/o sigmoid diverticulitis s/p Melinda's procedure and reversal of colostomy in 2011    PLAN/RECOMMENDATIONS:    oxygen supplementation as needed to keep saturation greater than 92%  change to prednisone 40 mg daily with decrease by 10 mg q 3 days.  first dose today  albuterol/atrovent nebs  pulmicort nebs 0.5mg 2 times daily  singulair  GI/DVT prophylaxis  cozaar  monitor glucose  increase activity  antitussives  pt is stable for dc from pulmonary standpoint, but hyperglycemia with insulin use needs to be addressed first  discussed with pt    Ifeoma Bell MD, [facility] - [phone]  Pulmonary Medicine

## 2019-03-02 NOTE — PROGRESS NOTE ADULT - SUBJECTIVE AND OBJECTIVE BOX
Chief complaint  Patient is a 74y old  Female who presents with a chief complaint of cough and sob (01 Mar 2019 14:46)   Review of systems  Patient in bed, looks comfortable, no fever, no hypoglycemia.    Labs and Fingersticks  CAPILLARY BLOOD GLUCOSE      POCT Blood Glucose.: 103 mg/dL (01 Mar 2019 12:15)  POCT Blood Glucose.: 181 mg/dL (01 Mar 2019 07:48)  POCT Blood Glucose.: 151 mg/dL (28 Feb 2019 21:17)  POCT Blood Glucose.: 123 mg/dL (28 Feb 2019 16:53)      Anion Gap, Serum: 12 (03-01 @ 05:56)  Anion Gap, Serum: 15 (02-28 @ 06:00)    Hemoglobin A1C, Whole Blood: 7.4 <H> (02-28 @ 09:39)    Calcium, Total Serum: 8.8 (03-01 @ 05:56)  Calcium, Total Serum: 9.1 (02-28 @ 06:00)          03-01    138  |  101  |  24<H>  ----------------------------<  230<H>  4.5   |  25  |  0.75    Ca    8.8      01 Mar 2019 05:56                          12.4   7.01  )-----------( 254      ( 01 Mar 2019 09:51 )             38.3     Medications  MEDICATIONS  (STANDING):  ALBUTerol/ipratropium for Nebulization 3 milliLiter(s) Nebulizer <User Schedule>  benzonatate 200 milliGRAM(s) Oral three times a day  buDESOnide   0.5 milliGRAM(s) Respule 0.5 milliGRAM(s) Inhalation every 12 hours  dextrose 5%. 1000 milliLiter(s) (50 mL/Hr) IV Continuous <Continuous>  dextrose 50% Injectable 12.5 Gram(s) IV Push once  dextrose 50% Injectable 25 Gram(s) IV Push once  dextrose 50% Injectable 25 Gram(s) IV Push once  enoxaparin Injectable 40 milliGRAM(s) SubCutaneous daily  guaiFENesin/dextromethorphan  Syrup 10 milliLiter(s) Oral four times a day  insulin glargine Injectable (LANTUS) 25 Unit(s) SubCutaneous at bedtime  insulin lispro (HumaLOG) corrective regimen sliding scale   SubCutaneous three times a day before meals  insulin lispro (HumaLOG) corrective regimen sliding scale   SubCutaneous at bedtime  insulin lispro Injectable (HumaLOG) 10 Unit(s) SubCutaneous three times a day before meals  losartan 50 milliGRAM(s) Oral daily  methylPREDNISolone sodium succinate Injectable 20 milliGRAM(s) IV Push every 8 hours  montelukast 10 milliGRAM(s) Oral daily  oseltamivir 75 milliGRAM(s) Oral two times a day      Physical Exam  Culture - Sputum (collected 02-28-19 @ 18:31)  Source: .Sputum Sputum  Gram Stain (02-28-19 @ 22:04):    No polymorphonuclear leukocytes per low power field    Rare Squamous epithelial cells per low power field    Numerous Gram Negative Rods per oil power field    Few Gram Positive Cocci in Pairs and Chains per oil power field    Rare Gram Positive Rods per oil power field      General: Patient comfortable in bed  Vital Signs Last 12 Hrs  T(F): 97.9 (03-01-19 @ 11:41), Max: 98 (03-01-19 @ 08:57)  HR: 63 (03-01-19 @ 11:41) (63 - 74)  BP: 138/78 (03-01-19 @ 11:41) (134/80 - 138/78)  BP(mean): --  RR: 18 (03-01-19 @ 11:41) (18 - 18)  SpO2: 95% (03-01-19 @ 11:41) (94% - 95%)  Neck: No palpable thyroid nodules.  CVS: S1S2, No murmurs  Respiratory: No wheezing, no crepitations  GI: Abdomen soft, bowel sounds positive  Musculoskeletal:  edema lower extremities.   Skin: No skin rashes, no ecchymosis    Diagnostics
Chief complaint  Patient is a 74y old  Female who presents with a chief complaint of cough and sob (02 Mar 2019 12:10)   Review of systems  Patient in bed, looks comfortable, no fever, no hypoglycemia.    Labs and Fingersticks  CAPILLARY BLOOD GLUCOSE      POCT Blood Glucose.: 229 mg/dL (02 Mar 2019 12:34)  POCT Blood Glucose.: 284 mg/dL (02 Mar 2019 09:33)  POCT Blood Glucose.: 159 mg/dL (02 Mar 2019 08:06)  POCT Blood Glucose.: 181 mg/dL (01 Mar 2019 22:14)  POCT Blood Glucose.: 264 mg/dL (01 Mar 2019 16:35)      Anion Gap, Serum: 9 (03-02 @ 06:48)  Anion Gap, Serum: 12 (03-01 @ 05:56)      Calcium, Total Serum: 8.9 (03-02 @ 06:48)  Calcium, Total Serum: 8.8 (03-01 @ 05:56)          03-02    139  |  102  |  24<H>  ----------------------------<  191<H>  4.6   |  28  |  0.71    Ca    8.9      02 Mar 2019 06:48                          13.4   8.90  )-----------( 263      ( 02 Mar 2019 10:05 )             40.8     Medications  MEDICATIONS  (STANDING):  ALBUTerol/ipratropium for Nebulization 3 milliLiter(s) Nebulizer <User Schedule>  benzonatate 200 milliGRAM(s) Oral three times a day  buDESOnide   0.5 milliGRAM(s) Respule 0.5 milliGRAM(s) Inhalation every 12 hours  dextrose 5%. 1000 milliLiter(s) (50 mL/Hr) IV Continuous <Continuous>  dextrose 50% Injectable 12.5 Gram(s) IV Push once  dextrose 50% Injectable 25 Gram(s) IV Push once  dextrose 50% Injectable 25 Gram(s) IV Push once  enoxaparin Injectable 40 milliGRAM(s) SubCutaneous daily  guaiFENesin/dextromethorphan  Syrup 10 milliLiter(s) Oral four times a day  insulin glargine Injectable (LANTUS) 25 Unit(s) SubCutaneous at bedtime  insulin lispro (HumaLOG) corrective regimen sliding scale   SubCutaneous three times a day before meals  insulin lispro (HumaLOG) corrective regimen sliding scale   SubCutaneous at bedtime  insulin lispro Injectable (HumaLOG) 10 Unit(s) SubCutaneous three times a day before meals  losartan 50 milliGRAM(s) Oral daily  methylPREDNISolone sodium succinate Injectable 20 milliGRAM(s) IV Push every 8 hours  montelukast 10 milliGRAM(s) Oral daily  oseltamivir 75 milliGRAM(s) Oral two times a day      Physical Exam  General: Patient comfortable in bed  Vital Signs Last 12 Hrs  T(F): 97.4 (03-02-19 @ 11:42), Max: 97.8 (03-02-19 @ 04:07)  HR: 72 (03-02-19 @ 11:42) (63 - 72)  BP: 138/72 (03-02-19 @ 11:42) (138/72 - 167/76)  BP(mean): --  RR: 18 (03-02-19 @ 11:42) (18 - 18)  SpO2: 95% (03-02-19 @ 11:42) (95% - 97%)  Neck: No palpable thyroid nodules.  CVS: S1S2, No murmurs  Respiratory: No wheezing, no crepitations  GI: Abdomen soft, bowel sounds positive  Musculoskeletal:  edema lower extremities.   Skin: No skin rashes, no ecchymosis    Diagnostics
Follow-up Pulm Progress Note    No new respiratory events overnight.  Denies increased SOB, chest pain, cough or mucus.    Medications:  MEDICATIONS  (STANDING):  ALBUTerol/ipratropium for Nebulization 3 milliLiter(s) Nebulizer <User Schedule>  benzonatate 200 milliGRAM(s) Oral three times a day  buDESOnide   0.5 milliGRAM(s) Respule 0.5 milliGRAM(s) Inhalation every 12 hours  dextrose 5%. 1000 milliLiter(s) (50 mL/Hr) IV Continuous <Continuous>  dextrose 50% Injectable 12.5 Gram(s) IV Push once  dextrose 50% Injectable 25 Gram(s) IV Push once  dextrose 50% Injectable 25 Gram(s) IV Push once  enoxaparin Injectable 40 milliGRAM(s) SubCutaneous daily  guaiFENesin/dextromethorphan  Syrup 10 milliLiter(s) Oral four times a day  insulin glargine Injectable (LANTUS) 25 Unit(s) SubCutaneous at bedtime  insulin lispro (HumaLOG) corrective regimen sliding scale   SubCutaneous three times a day before meals  insulin lispro (HumaLOG) corrective regimen sliding scale   SubCutaneous at bedtime  insulin lispro Injectable (HumaLOG) 10 Unit(s) SubCutaneous three times a day before meals  losartan 50 milliGRAM(s) Oral daily  methylPREDNISolone sodium succinate Injectable 20 milliGRAM(s) IV Push every 8 hours  montelukast 10 milliGRAM(s) Oral daily  oseltamivir 75 milliGRAM(s) Oral two times a day    MEDICATIONS  (PRN):  dextrose 40% Gel 15 Gram(s) Oral once PRN Blood Glucose LESS THAN 70 milliGRAM(s)/deciliter  glucagon  Injectable 1 milliGRAM(s) IntraMuscular once PRN Glucose LESS THAN 70 milligrams/deciliter      Vent settings (if applicable)      Vital Signs Last 24 Hrs  T(C): 36.3 (02 Mar 2019 11:42), Max: 36.7 (01 Mar 2019 22:15)  T(F): 97.4 (02 Mar 2019 11:42), Max: 98.1 (01 Mar 2019 22:15)  HR: 72 (02 Mar 2019 11:42) (61 - 72)  BP: 138/72 (02 Mar 2019 11:42) (138/72 - 167/76)  BP(mean): --  RR: 18 (02 Mar 2019 11:42) (18 - 18)  SpO2: 95% (02 Mar 2019 11:42) (95% - 99%)          03-01 @ 07:01  -  03-02 @ 07:00  --------------------------------------------------------  IN: 472 mL / OUT: 0 mL / NET: 472 mL          LABS:                        13.4   8.90  )-----------( 263      ( 02 Mar 2019 10:05 )             40.8     03-02    139  |  102  |  24<H>  ----------------------------<  191<H>  4.6   |  28  |  0.71    Ca    8.9      02 Mar 2019 06:48            CAPILLARY BLOOD GLUCOSE      POCT Blood Glucose.: 229 mg/dL (02 Mar 2019 12:34)              CULTURES:  Culture Results:   Normal Respiratory Ginny present (02-28 @ 18:31)  Culture Results:   No growth to date. (02-25 @ 16:46)  Culture Results:   No growth to date. (02-25 @ 16:30)    Most recent blood culture -- 02-28 @ 18:31   -- -- .Sputum Sputum 02-28 @ 18:31  Most recent blood culture -- 02-25 @ 16:46   -- -- .Blood 02-25 @ 16:46  Most recent blood culture -- 02-25 @ 16:30   -- -- .Blood Blood-Peripheral 02-25 @ 16:30      Physical Examination:  Awake and alert, generally comfortable  HEENT: unremarkable  PULM: scattered coarse BS, no significant sputum production  CVS: Regular rate and rhythm, no murmurs, rubs, or gallops  Abd:  soft, non tender  Extrem: No CCE    RADIOLOGY REVIEWED  CXR:    CT chest:
Follow-up Pulm Progress Note    No new respiratory events overnight.  Denies increased SOB, chest pain, cough or mucus.  feels better    Medications:  MEDICATIONS  (STANDING):  ALBUTerol/ipratropium for Nebulization 3 milliLiter(s) Nebulizer <User Schedule>  benzonatate 200 milliGRAM(s) Oral three times a day  buDESOnide   0.5 milliGRAM(s) Respule 0.5 milliGRAM(s) Inhalation every 12 hours  enoxaparin Injectable 40 milliGRAM(s) SubCutaneous daily  guaiFENesin/dextromethorphan  Syrup 10 milliLiter(s) Oral four times a day  levoFLOXacin  Tablet 500 milliGRAM(s) Oral every 24 hours  losartan 50 milliGRAM(s) Oral daily  methylPREDNISolone sodium succinate Injectable 40 milliGRAM(s) IV Push every 6 hours  montelukast 10 milliGRAM(s) Oral daily  oseltamivir 75 milliGRAM(s) Oral two times a day    MEDICATIONS  (PRN):      Vent settings (if applicable)      Vital Signs Last 24 Hrs  T(C): 36.7 (2019 04:45), Max: 37.3 (2019 10:00)  T(F): 98.1 (2019 04:45), Max: 99.1 (2019 10:00)  HR: 94 (2019 04:45) (92 - 110)  BP: 124/71 (2019 04:45) (114/56 - 155/89)  BP(mean): --  RR: 20 (2019 04:45) (20 - 28)  SpO2: 94% (2019 04:45) (93% - 100%)           @ 07:01  -   @ 07:00  --------------------------------------------------------  IN: 240 mL / OUT: 0 mL / NET: 240 mL          LABS:                        16.6   16.1  )-----------( 301      ( 2019 10:13 )             50.3         139  |  103  |  17  ----------------------------<  272<H>  3.9   |  20<L>  |  0.82    Ca    9.2      2019 06:18    TPro  7.5  /  Alb  4.6  /  TBili  2.3<H>  /  DBili  x   /  AST  19  /  ALT  16  /  AlkPhos  98  02-          CAPILLARY BLOOD GLUCOSE        PT/INR - ( 2019 10:13 )   PT: 14.0 sec;   INR: 1.22 ratio         PTT - ( 2019 10:13 )  PTT:29.9 sec  Urinalysis Basic - ( 2019 16:34 )    Color: Light Yellow / Appearance: Clear / S.016 / pH: x  Gluc: x / Ketone: Small  / Bili: Negative / Urobili: Negative   Blood: x / Protein: Trace / Nitrite: Negative   Leuk Esterase: Negative / RBC: x / WBC x   Sq Epi: x / Non Sq Epi: x / Bacteria: x            CULTURES:        Physical Examination:  Awake and alert, generally comfortable  HEENT: unremarkable  PULM: scattered wheeze to auscultation bilaterally, no significant sputum production  CVS: Regular rate and rhythm, no murmurs, rubs, or gallops  Abd:  soft, non tender  Extrem: No CCE    RADIOLOGY REVIEWED  CXR:    CT chest:  < from: CT Chest No Cont (19 @ 16:17) >    EXAM:  CT CHEST                            PROCEDURE DATE:  2019            INTERPRETATION:  Clinical information: Cough and shortness of breath.   Exam is compared to previous study of 2014.    CT scan of the chest was obtained without administration of intravenous   contrast.    No hilar and/or mediastinal adenopathy is noted.     Heart is normal in size. Calcification of the aortic valve and the   coronary arteries is noted. No pericardial effusion is noted.     No endobronchial lesions are noted. Mucus/secretions are present within   several bronchi within both lower lobes. 0.5 cm solid nodule in the right   lower lobe is unchanged when compared to previous exam. Remaining lungs   are clear. No pleural effusions are noted.    Small hiatal hernia is noted.    Below the diaphragm, visualized portions of the abdomen demonstrate   patient to be status post cholecystectomy.     Degenerative changes of the spine are noted.    Impression: There is no pneumonia.        < end of copied text >
Follow-up Pulm Progress Note    No new respiratory events overnight.  Denies increased SOB, chest pain, cough or mucus.  slowly improving    Medications:  MEDICATIONS  (STANDING):  ALBUTerol/ipratropium for Nebulization 3 milliLiter(s) Nebulizer <User Schedule>  benzonatate 200 milliGRAM(s) Oral three times a day  buDESOnide   0.5 milliGRAM(s) Respule 0.5 milliGRAM(s) Inhalation every 12 hours  dextrose 5%. 1000 milliLiter(s) (50 mL/Hr) IV Continuous <Continuous>  dextrose 50% Injectable 12.5 Gram(s) IV Push once  dextrose 50% Injectable 25 Gram(s) IV Push once  dextrose 50% Injectable 25 Gram(s) IV Push once  enoxaparin Injectable 40 milliGRAM(s) SubCutaneous daily  guaiFENesin/dextromethorphan  Syrup 10 milliLiter(s) Oral four times a day  insulin glargine Injectable (LANTUS) 25 Unit(s) SubCutaneous at bedtime  insulin lispro (HumaLOG) corrective regimen sliding scale   SubCutaneous three times a day before meals  insulin lispro (HumaLOG) corrective regimen sliding scale   SubCutaneous at bedtime  insulin lispro Injectable (HumaLOG) 10 Unit(s) SubCutaneous three times a day before meals  losartan 50 milliGRAM(s) Oral daily  methylPREDNISolone sodium succinate Injectable 20 milliGRAM(s) IV Push every 8 hours  montelukast 10 milliGRAM(s) Oral daily  oseltamivir 75 milliGRAM(s) Oral two times a day    MEDICATIONS  (PRN):  dextrose 40% Gel 15 Gram(s) Oral once PRN Blood Glucose LESS THAN 70 milliGRAM(s)/deciliter  glucagon  Injectable 1 milliGRAM(s) IntraMuscular once PRN Glucose LESS THAN 70 milligrams/deciliter      Vent settings (if applicable)      Vital Signs Last 24 Hrs  T(C): 36.4 (01 Mar 2019 04:08), Max: 36.6 (28 Feb 2019 21:15)  T(F): 97.6 (01 Mar 2019 04:08), Max: 97.8 (28 Feb 2019 21:15)  HR: 89 (01 Mar 2019 04:08) (60 - 89)  BP: 154/77 (01 Mar 2019 04:08) (124/76 - 154/77)  BP(mean): --  RR: 18 (01 Mar 2019 04:08) (18 - 18)  SpO2: 98% (01 Mar 2019 04:08) (96% - 98%)          02-28 @ 07:01  -  03-01 @ 07:00  --------------------------------------------------------  IN: 620 mL / OUT: 0 mL / NET: 620 mL          LABS:                        13.2   11.96 )-----------( 297      ( 28 Feb 2019 09:39 )             41.5     03-01    138  |  101  |  24<H>  ----------------------------<  230<H>  4.5   |  25  |  0.75    Ca    8.8      01 Mar 2019 05:56            CAPILLARY BLOOD GLUCOSE      POCT Blood Glucose.: 181 mg/dL (01 Mar 2019 07:48)              CULTURES:  Culture Results:   No growth to date. (02-25 @ 16:46)  Culture Results:   No growth to date. (02-25 @ 16:30)    Most recent blood culture -- 02-28 @ 18:31   -- -- .Sputum Sputum 02-28 @ 18:31  Most recent blood culture -- 02-25 @ 16:46   -- -- .Blood 02-25 @ 16:46  Most recent blood culture -- 02-25 @ 16:30   -- -- .Blood Blood-Peripheral 02-25 @ 16:30      Physical Examination:  Awake and alert, generally comfortable  HEENT: unremarkable  PULM: wheezing and coarse BS to auscultation bilaterally, no significant sputum production  CVS: Regular rate and rhythm, no murmurs, rubs, or gallops  Abd:  soft, non tender  Extrem: No CCE    RADIOLOGY REVIEWED  CXR:    CT chest:
Follow-up Pulm Progress Note    No new respiratory events overnight.  Denies increased SOB, chest pain, cough or mucus.  still with cough but feels better    Medications:  MEDICATIONS  (STANDING):  ALBUTerol/ipratropium for Nebulization 3 milliLiter(s) Nebulizer <User Schedule>  benzonatate 200 milliGRAM(s) Oral three times a day  buDESOnide   0.5 milliGRAM(s) Respule 0.5 milliGRAM(s) Inhalation every 12 hours  enoxaparin Injectable 40 milliGRAM(s) SubCutaneous daily  guaiFENesin/dextromethorphan  Syrup 10 milliLiter(s) Oral four times a day  levoFLOXacin  Tablet 500 milliGRAM(s) Oral every 24 hours  losartan 50 milliGRAM(s) Oral daily  methylPREDNISolone sodium succinate Injectable 40 milliGRAM(s) IV Push every 6 hours  montelukast 10 milliGRAM(s) Oral daily  oseltamivir 75 milliGRAM(s) Oral two times a day    MEDICATIONS  (PRN):      Vent settings (if applicable)      Vital Signs Last 24 Hrs  T(C): 36.5 (2019 04:43), Max: 36.7 (2019 21:17)  T(F): 97.7 (2019 04:43), Max: 98 (2019 21:17)  HR: 76 (2019 04:43) (76 - 94)  BP: 152/75 (2019 04:43) (133/69 - 154/74)  BP(mean): --  RR: 18 (2019 04:43) (18 - 20)  SpO2: 94% (2019 04:43) (94% - 99%)           @ 07:01  -   @ 07:00  --------------------------------------------------------  IN: 1020 mL / OUT: 0 mL / NET: 1020 mL          LABS:                        13.3   13.62 )-----------( 283      ( 2019 08:37 )             42.1         144  |  106  |  19  ----------------------------<  253<H>  4.5   |  22  |  0.77    Ca    9.3      2019 06:27    TPro  7.5  /  Alb  4.6  /  TBili  2.3<H>  /  DBili  x   /  AST  19  /  ALT  16  /  AlkPhos  98            CAPILLARY BLOOD GLUCOSE        PT/INR - ( 2019 10:13 )   PT: 14.0 sec;   INR: 1.22 ratio         PTT - ( 2019 10:13 )  PTT:29.9 sec  Urinalysis Basic - ( 2019 16:34 )    Color: Light Yellow / Appearance: Clear / S.016 / pH: x  Gluc: x / Ketone: Small  / Bili: Negative / Urobili: Negative   Blood: x / Protein: Trace / Nitrite: Negative   Leuk Esterase: Negative / RBC: x / WBC x   Sq Epi: x / Non Sq Epi: x / Bacteria: x            CULTURES:  Culture Results:   No growth to date. ( @ 16:46)  Culture Results:   No growth to date. ( @ 16:30)    Most recent blood culture --  @ 16:46   -- -- .Blood  @ 16:46  Most recent blood culture --  @ 16:30   -- -- .Blood Blood-Peripheral  @ 16:30      Physical Examination:  Awake and alert, generally comfortable  HEENT: unremarkable  PULM: Clear to auscultation bilaterally, no significant sputum production  CVS: Regular rate and rhythm, no murmurs, rubs, or gallops  Abd:  soft, non tender  Extrem: No CCE    RADIOLOGY REVIEWED  CXR:    CT chest:
Follow-up Pulm Progress Note    No new respiratory events overnight.  Denies increased SOB, chest pain, cough or mucus.  still with significant cough, but wheezing better.  glucose still high, but improving    Medications:  MEDICATIONS  (STANDING):  ALBUTerol/ipratropium for Nebulization 3 milliLiter(s) Nebulizer <User Schedule>  benzonatate 200 milliGRAM(s) Oral three times a day  buDESOnide   0.5 milliGRAM(s) Respule 0.5 milliGRAM(s) Inhalation every 12 hours  dextrose 5%. 1000 milliLiter(s) (50 mL/Hr) IV Continuous <Continuous>  dextrose 50% Injectable 12.5 Gram(s) IV Push once  dextrose 50% Injectable 25 Gram(s) IV Push once  dextrose 50% Injectable 25 Gram(s) IV Push once  enoxaparin Injectable 40 milliGRAM(s) SubCutaneous daily  guaiFENesin/dextromethorphan  Syrup 10 milliLiter(s) Oral four times a day  insulin lispro (HumaLOG) corrective regimen sliding scale   SubCutaneous at bedtime  insulin lispro (HumaLOG) corrective regimen sliding scale   SubCutaneous three times a day before meals  losartan 50 milliGRAM(s) Oral daily  methylPREDNISolone sodium succinate Injectable 40 milliGRAM(s) IV Push every 8 hours  montelukast 10 milliGRAM(s) Oral daily  oseltamivir 75 milliGRAM(s) Oral two times a day    MEDICATIONS  (PRN):  dextrose 40% Gel 15 Gram(s) Oral once PRN Blood Glucose LESS THAN 70 milliGRAM(s)/deciliter  glucagon  Injectable 1 milliGRAM(s) IntraMuscular once PRN Glucose LESS THAN 70 milligrams/deciliter      Vent settings (if applicable)      Vital Signs Last 24 Hrs  T(C): 36.6 (28 Feb 2019 07:52), Max: 36.7 (28 Feb 2019 00:29)  T(F): 97.9 (28 Feb 2019 07:52), Max: 98.1 (28 Feb 2019 00:29)  HR: 67 (28 Feb 2019 07:52) (65 - 78)  BP: 120/74 (28 Feb 2019 07:52) (118/69 - 155/78)  BP(mean): --  RR: 18 (28 Feb 2019 07:52) (18 - 18)  SpO2: 98% (28 Feb 2019 07:52) (92% - 98%)          02-27 @ 07:01  -  02-28 @ 07:00  --------------------------------------------------------  IN: 780 mL / OUT: 0 mL / NET: 780 mL          LABS:                        13.3   13.62 )-----------( 283      ( 27 Feb 2019 08:37 )             42.1     02-28    142  |  103  |  23  ----------------------------<  287<H>  4.6   |  24  |  0.73    Ca    9.1      28 Feb 2019 06:00            CAPILLARY BLOOD GLUCOSE      POCT Blood Glucose.: 258 mg/dL (28 Feb 2019 07:48)              CULTURES:  Culture Results:   No growth to date. (02-25 @ 16:46)  Culture Results:   No growth to date. (02-25 @ 16:30)    Most recent blood culture -- 02-25 @ 16:46   -- -- .Blood 02-25 @ 16:46  Most recent blood culture -- 02-25 @ 16:30   -- -- .Blood Blood-Peripheral 02-25 @ 16:30      Physical Examination:  Awake and alert, generally comfortable  HEENT: unremarkable  PULM: Clear to auscultation bilaterally, no significant sputum production  CVS: Regular rate and rhythm, no murmurs, rubs, or gallops  Abd:  soft, non tender  Extrem: No CCE    RADIOLOGY REVIEWED  CXR:    CT chest:
Patient is a 74y old  Female who presents with a chief complaint of cough and sob (01 Mar 2019 09:23)      INTERVAL HPI/OVERNIGHT EVENTS:noted, feels well, improved cough and wheezing      Vital Signs Last 24 Hrs  T(C): 36.6 (01 Mar 2019 11:41), Max: 36.7 (01 Mar 2019 08:57)  T(F): 97.9 (01 Mar 2019 11:41), Max: 98 (01 Mar 2019 08:57)  HR: 63 (01 Mar 2019 11:41) (60 - 89)  BP: 138/78 (01 Mar 2019 11:41) (124/76 - 154/77)  BP(mean): --  RR: 18 (01 Mar 2019 11:41) (18 - 18)  SpO2: 95% (01 Mar 2019 11:41) (94% - 98%)    ALBUTerol/ipratropium for Nebulization 3 milliLiter(s) Nebulizer <User Schedule>  benzonatate 200 milliGRAM(s) Oral three times a day  buDESOnide   0.5 milliGRAM(s) Respule 0.5 milliGRAM(s) Inhalation every 12 hours  dextrose 40% Gel 15 Gram(s) Oral once PRN  dextrose 5%. 1000 milliLiter(s) IV Continuous <Continuous>  dextrose 50% Injectable 12.5 Gram(s) IV Push once  dextrose 50% Injectable 25 Gram(s) IV Push once  dextrose 50% Injectable 25 Gram(s) IV Push once  enoxaparin Injectable 40 milliGRAM(s) SubCutaneous daily  glucagon  Injectable 1 milliGRAM(s) IntraMuscular once PRN  guaiFENesin/dextromethorphan  Syrup 10 milliLiter(s) Oral four times a day  insulin glargine Injectable (LANTUS) 25 Unit(s) SubCutaneous at bedtime  insulin lispro (HumaLOG) corrective regimen sliding scale   SubCutaneous three times a day before meals  insulin lispro (HumaLOG) corrective regimen sliding scale   SubCutaneous at bedtime  insulin lispro Injectable (HumaLOG) 10 Unit(s) SubCutaneous three times a day before meals  losartan 50 milliGRAM(s) Oral daily  methylPREDNISolone sodium succinate Injectable 20 milliGRAM(s) IV Push every 8 hours  montelukast 10 milliGRAM(s) Oral daily  oseltamivir 75 milliGRAM(s) Oral two times a day      PHYSICAL EXAM:  GENERAL: NAD,   EYES: conjunctiva and sclera clear  ENMT: Moist mucous membranes  NECK: Supple, No JVD, Normal thyroid  NERVOUS SYSTEM:  Alert & Oriented X3,   CHEST/LUNG: Clear to auscultation bilaterally; No rales, rhonchi, wheezing, or rubs  HEART: Regular rate and rhythm; No murmurs, rubs, or gallops  ABDOMEN: Soft, Nontender, Nondistended; Bowel sounds present  EXTREMITIES:  2+ Peripheral Pulses, No clubbing, cyanosis, or edema  LYMPH: No lymphadenopathy noted  SKIN: No rashes or lesions    Consultant(s) Notes Reviewed:  [x ] YES  [ ] NO  Care Discussed with Consultants/Other Providers [ x] YES  [ ] NO    LABS:                        12.4   7.01  )-----------( 254      ( 01 Mar 2019 09:51 )             38.3     03-01    138  |  101  |  24<H>  ----------------------------<  230<H>  4.5   |  25  |  0.75    Ca    8.8      01 Mar 2019 05:56          CAPILLARY BLOOD GLUCOSE      POCT Blood Glucose.: 103 mg/dL (01 Mar 2019 12:15)  POCT Blood Glucose.: 181 mg/dL (01 Mar 2019 07:48)  POCT Blood Glucose.: 151 mg/dL (28 Feb 2019 21:17)  POCT Blood Glucose.: 123 mg/dL (28 Feb 2019 16:53)            RADIOLOGY & ADDITIONAL TESTS:    Imaging Personally Reviewed:  [ ] YES  [ ] NO
Patient is a 74y old  Female who presents with a chief complaint of cough and sob (01 Mar 2019 16:24)      INTERVAL HPI/OVERNIGHT EVENTS:feels well, receiving nebs      Vital Signs Last 24 Hrs  T(C): 36.3 (02 Mar 2019 11:42), Max: 36.7 (01 Mar 2019 22:15)  T(F): 97.4 (02 Mar 2019 11:42), Max: 98.1 (01 Mar 2019 22:15)  HR: 72 (02 Mar 2019 11:42) (61 - 72)  BP: 138/72 (02 Mar 2019 11:42) (138/72 - 167/76)  BP(mean): --  RR: 18 (02 Mar 2019 11:42) (18 - 18)  SpO2: 95% (02 Mar 2019 11:42) (95% - 99%)    ALBUTerol/ipratropium for Nebulization 3 milliLiter(s) Nebulizer <User Schedule>  benzonatate 200 milliGRAM(s) Oral three times a day  buDESOnide   0.5 milliGRAM(s) Respule 0.5 milliGRAM(s) Inhalation every 12 hours  dextrose 40% Gel 15 Gram(s) Oral once PRN  dextrose 5%. 1000 milliLiter(s) IV Continuous <Continuous>  dextrose 50% Injectable 12.5 Gram(s) IV Push once  dextrose 50% Injectable 25 Gram(s) IV Push once  dextrose 50% Injectable 25 Gram(s) IV Push once  enoxaparin Injectable 40 milliGRAM(s) SubCutaneous daily  glucagon  Injectable 1 milliGRAM(s) IntraMuscular once PRN  guaiFENesin/dextromethorphan  Syrup 10 milliLiter(s) Oral four times a day  insulin glargine Injectable (LANTUS) 25 Unit(s) SubCutaneous at bedtime  insulin lispro (HumaLOG) corrective regimen sliding scale   SubCutaneous three times a day before meals  insulin lispro (HumaLOG) corrective regimen sliding scale   SubCutaneous at bedtime  insulin lispro Injectable (HumaLOG) 10 Unit(s) SubCutaneous three times a day before meals  losartan 50 milliGRAM(s) Oral daily  methylPREDNISolone sodium succinate Injectable 20 milliGRAM(s) IV Push every 8 hours  montelukast 10 milliGRAM(s) Oral daily  oseltamivir 75 milliGRAM(s) Oral two times a day      PHYSICAL EXAM:  GENERAL: NAD,   EYES: conjunctiva and sclera clear  ENMT: Moist mucous membranes  NECK: Supple, No JVD, Normal thyroid  NERVOUS SYSTEM:  Alert & Oriented X3,   CHEST/LUNG: scattered wheezing  HEART: Regular rate and rhythm; No murmurs, rubs, or gallops  ABDOMEN: Soft, Nontender, Nondistended; Bowel sounds present  EXTREMITIES:  2+ Peripheral Pulses, No clubbing, cyanosis, or edema  LYMPH: No lymphadenopathy noted  SKIN: No rashes or lesions    Consultant(s) Notes Reviewed:  [x ] YES  [ ] NO  Care Discussed with Consultants/Other Providers [ x] YES  [ ] NO    LABS:                        13.4   8.90  )-----------( 263      ( 02 Mar 2019 10:05 )             40.8     03-02    139  |  102  |  24<H>  ----------------------------<  191<H>  4.6   |  28  |  0.71    Ca    8.9      02 Mar 2019 06:48          CAPILLARY BLOOD GLUCOSE      POCT Blood Glucose.: 284 mg/dL (02 Mar 2019 09:33)  POCT Blood Glucose.: 159 mg/dL (02 Mar 2019 08:06)  POCT Blood Glucose.: 181 mg/dL (01 Mar 2019 22:14)  POCT Blood Glucose.: 264 mg/dL (01 Mar 2019 16:35)  POCT Blood Glucose.: 103 mg/dL (01 Mar 2019 12:15)            RADIOLOGY & ADDITIONAL TESTS:    Imaging Personally Reviewed:  [ x] YES  [ ] NO
Patient is a 74y old  Female who presents with a chief complaint of cough and sob (2019 08:56)                                                               INTERVAL HPI/OVERNIGHT EVENTS:    REVIEW OF SYSTEMS:     CONSTITUTIONAL: No weakness, fevers or chills  RESPIRATORY: imrpvoing  cough, wheezing,  No shortness of breath  CARDIOVASCULAR: No chest pain or palpitations  GASTROINTESTINAL: No abdominal pain  . No nausea, vomiting, or hematemesis; No diarrhea or constipation. No melena or hematochezia.  GENITOURINARY: No dysuria, frequency or hematuria  NEUROLOGICAL: No numbness or weakness                                                                                                                                                                                                                                                                                 Medications:  MEDICATIONS  (STANDING):  ALBUTerol/ipratropium for Nebulization 3 milliLiter(s) Nebulizer <User Schedule>  benzonatate 200 milliGRAM(s) Oral three times a day  buDESOnide   0.5 milliGRAM(s) Respule 0.5 milliGRAM(s) Inhalation every 12 hours  dextrose 5%. 1000 milliLiter(s) (50 mL/Hr) IV Continuous <Continuous>  dextrose 50% Injectable 12.5 Gram(s) IV Push once  dextrose 50% Injectable 25 Gram(s) IV Push once  dextrose 50% Injectable 25 Gram(s) IV Push once  enoxaparin Injectable 40 milliGRAM(s) SubCutaneous daily  guaiFENesin/dextromethorphan  Syrup 10 milliLiter(s) Oral four times a day  insulin glargine Injectable (LANTUS) 13 Unit(s) SubCutaneous at bedtime  insulin lispro (HumaLOG) corrective regimen sliding scale   SubCutaneous three times a day before meals  insulin lispro (HumaLOG) corrective regimen sliding scale   SubCutaneous at bedtime  insulin lispro Injectable (HumaLOG) 9 Unit(s) SubCutaneous three times a day before meals  losartan 50 milliGRAM(s) Oral daily  methylPREDNISolone sodium succinate Injectable 20 milliGRAM(s) IV Push every 8 hours  montelukast 10 milliGRAM(s) Oral daily  oseltamivir 75 milliGRAM(s) Oral two times a day    MEDICATIONS  (PRN):  dextrose 40% Gel 15 Gram(s) Oral once PRN Blood Glucose LESS THAN 70 milliGRAM(s)/deciliter  glucagon  Injectable 1 milliGRAM(s) IntraMuscular once PRN Glucose LESS THAN 70 milligrams/deciliter       Allergies    Bactrim (Short breath)  penicillins (Rash)  Zyflo (Stomach Upset; Vomiting)    Intolerances      Vital Signs Last 24 Hrs  T(C): 36.4 (2019 12:00), Max: 36.7 (2019 00:29)  T(F): 97.5 (2019 12:00), Max: 98.1 (2019 00:29)  HR: 61 (2019 12:00) (61 - 78)  BP: 129/76 (2019 12:00) (120/74 - 155/78)  BP(mean): --  RR: 18 (2019 12:00) (18 - 18)  SpO2: 96% (2019 12:00) (92% - 98%)  CAPILLARY BLOOD GLUCOSE      POCT Blood Glucose.: 239 mg/dL (2019 12:14)  POCT Blood Glucose.: 258 mg/dL (2019 07:48)  POCT Blood Glucose.: 339 mg/dL (2019 21:35)  POCT Blood Glucose.: 290 mg/dL (2019 16:36)       @ 07: @ 07:00  --------------------------------------------------------  IN: 780 mL / OUT: 0 mL / NET: 780 mL     @ 07: @ 14:03  --------------------------------------------------------  IN: 180 mL / OUT: 0 mL / NET: 180 mL      Physical Exam:    Daily Weight in k.9 (2019 07:52)  General:  NAD   HEENT:  Nonicteric, PERRLA  CV:  RRR, S1S2   Lungs:  wheezing B  R>L   Abdomen:  Soft, non-tender, no distended, positive BS  Extremities:  no edema  Skin:  Warm and dry, no rashes  :  No smith  Neuro:  AAOx3, non-focal, grossly intact                                                                                                                                                                                                                                                                                                LABS:                               13.2   11.96 )-----------( 297      ( 2019 09:39 )             41.5                      02-    142  |  103  |  23  ----------------------------<  287<H>  4.6   |  24  |  0.73    Ca    9.1      2019 06:00
Patient is a 74y old  Female who presents with a chief complaint of cough and sob (26 Feb 2019 08:54)                                                               INTERVAL HPI/OVERNIGHT EVENTS:    REVIEW OF SYSTEMS:     CONSTITUTIONAL: No weakness, fevers or chills  RESPIRATORY: cough, wheezing,   shortness of breath  CARDIOVASCULAR: No chest pain or palpitations  GASTROINTESTINAL: No abdominal pain  . No nausea, vomiting, or hematemesis; No diarrhea or constipation. No melena or hematochezia.  GENITOURINARY: No dysuria, frequency or hematuria  NEUROLOGICAL: No numbness or weakness                                                                                                                                                                                                                                                                                  Medications:  MEDICATIONS  (STANDING):  ALBUTerol/ipratropium for Nebulization 3 milliLiter(s) Nebulizer <User Schedule>  benzonatate 200 milliGRAM(s) Oral three times a day  buDESOnide   0.5 milliGRAM(s) Respule 0.5 milliGRAM(s) Inhalation every 12 hours  enoxaparin Injectable 40 milliGRAM(s) SubCutaneous daily  guaiFENesin/dextromethorphan  Syrup 10 milliLiter(s) Oral four times a day  levoFLOXacin  Tablet 500 milliGRAM(s) Oral every 24 hours  losartan 50 milliGRAM(s) Oral daily  methylPREDNISolone sodium succinate Injectable 40 milliGRAM(s) IV Push every 6 hours  montelukast 10 milliGRAM(s) Oral daily  oseltamivir 75 milliGRAM(s) Oral two times a day    MEDICATIONS  (PRN):       Allergies    Bactrim (Short breath)  penicillins (Rash)  Zyflo (Stomach Upset; Vomiting)    Intolerances      Vital Signs Last 24 Hrs  T(C): 36.7 (26 Feb 2019 21:17), Max: 36.7 (26 Feb 2019 04:45)  T(F): 98 (26 Feb 2019 21:17), Max: 98.1 (26 Feb 2019 04:45)  HR: 77 (26 Feb 2019 21:17) (77 - 94)  BP: 133/69 (26 Feb 2019 21:17) (124/71 - 154/74)  BP(mean): --  RR: 18 (26 Feb 2019 21:17) (18 - 20)  SpO2: 99% (26 Feb 2019 21:17) (94% - 99%)  CAPILLARY BLOOD GLUCOSE          02-25 @ 07:01  -  02-26 @ 07:00  --------------------------------------------------------  IN: 240 mL / OUT: 0 mL / NET: 240 mL    02-26 @ 07:01  - 02-26 @ 23:38  --------------------------------------------------------  IN: 1020 mL / OUT: 0 mL / NET: 1020 mL      Physical Exam:    General:  NAD  HEENT:  Nonicteric, PERRLA  CV:  RRR, S1S2   Lungs:  wheezing  B  Abdomen:  Soft, non-tender, no distended, positive BS  Extremities:  2+ pulses, no c/c, no edema  Skin:  Warm and dry, no rashes  :  No smith  Neuro:  AAOx3, non-focal, grossly intact                                                                                                                                                                                                                                                                                                LABS:                               13.7   12.62 )-----------( 276      ( 26 Feb 2019 09:37 )             44.2                      02-26    139  |  103  |  17  ----------------------------<  272<H>  3.9   |  20<L>  |  0.82    Ca    9.2      26 Feb 2019 06:18    TPro  7.5  /  Alb  4.6  /  TBili  2.3<H>  /  DBili  x   /  AST  19  /  ALT  16  /  AlkPhos  98  02-25
Patient is a 74y old  Female who presents with a chief complaint of cough and sob (27 Feb 2019 09:51)                                                               INTERVAL HPI/OVERNIGHT EVENTS:    REVIEW OF SYSTEMS:     CONSTITUTIONAL: No weakness, fevers or chills  RESPIRATORY: cough, wheezing, imprvoing  shortness of breath  CARDIOVASCULAR: No chest pain or palpitations  GASTROINTESTINAL: No abdominal pain  . No nausea, vomiting, or hematemesis; No diarrhea or constipation. No melena or hematochezia.  GENITOURINARY: No dysuria, frequency or hematuria  NEUROLOGICAL: No numbness or weakness                                                                                                                                                                                                                                                                                 Medications:  MEDICATIONS  (STANDING):  ALBUTerol/ipratropium for Nebulization 3 milliLiter(s) Nebulizer <User Schedule>  benzonatate 200 milliGRAM(s) Oral three times a day  buDESOnide   0.5 milliGRAM(s) Respule 0.5 milliGRAM(s) Inhalation every 12 hours  dextrose 5%. 1000 milliLiter(s) (50 mL/Hr) IV Continuous <Continuous>  dextrose 50% Injectable 12.5 Gram(s) IV Push once  dextrose 50% Injectable 25 Gram(s) IV Push once  dextrose 50% Injectable 25 Gram(s) IV Push once  enoxaparin Injectable 40 milliGRAM(s) SubCutaneous daily  guaiFENesin/dextromethorphan  Syrup 10 milliLiter(s) Oral four times a day  insulin lispro (HumaLOG) corrective regimen sliding scale   SubCutaneous three times a day before meals  losartan 50 milliGRAM(s) Oral daily  methylPREDNISolone sodium succinate Injectable 40 milliGRAM(s) IV Push every 8 hours  montelukast 10 milliGRAM(s) Oral daily  oseltamivir 75 milliGRAM(s) Oral two times a day    MEDICATIONS  (PRN):  dextrose 40% Gel 15 Gram(s) Oral once PRN Blood Glucose LESS THAN 70 milliGRAM(s)/deciliter  glucagon  Injectable 1 milliGRAM(s) IntraMuscular once PRN Glucose LESS THAN 70 milligrams/deciliter       Allergies    Bactrim (Short breath)  penicillins (Rash)  Zyflo (Stomach Upset; Vomiting)    Intolerances      Vital Signs Last 24 Hrs  T(C): 36.6 (27 Feb 2019 11:05), Max: 36.7 (26 Feb 2019 21:17)  T(F): 97.8 (27 Feb 2019 11:05), Max: 98 (26 Feb 2019 21:17)  HR: 69 (27 Feb 2019 11:05) (69 - 77)  BP: 118/69 (27 Feb 2019 11:05) (118/69 - 152/75)  BP(mean): --  RR: 18 (27 Feb 2019 11:05) (18 - 18)  SpO2: 97% (27 Feb 2019 11:05) (94% - 99%)  CAPILLARY BLOOD GLUCOSE      POCT Blood Glucose.: 290 mg/dL (27 Feb 2019 16:36)  POCT Blood Glucose.: 377 mg/dL (27 Feb 2019 11:51)      02-26 @ 07:01  -  02-27 @ 07:00  --------------------------------------------------------  IN: 1020 mL / OUT: 0 mL / NET: 1020 mL    02-27 @ 07:01 - 02-27 @ 20:00  --------------------------------------------------------  IN: 780 mL / OUT: 0 mL / NET: 780 mL      Physical Exam:    General: NAD   HEENT:  Nonicteric, PERRLA  CV:  RRR, S1S2   Lungs:  wheezing B   R>L   Abdomen:  Soft, non-tender, no distended, positive BS  Extremities:  no edema    Neuro:  AAOx3, non-focal, grossly intact                                                                                                                                                                                                                                                                                                LABS:                               13.3   13.62 )-----------( 283      ( 27 Feb 2019 08:37 )             42.1                      02-27    144  |  106  |  19  ----------------------------<  253<H>  4.5   |  22  |  0.77    Ca    9.3      27 Feb 2019 06:27

## 2019-03-02 NOTE — PROGRESS NOTE ADULT - ASSESSMENT
73 y/o F with PMH of Bronchiectasis, Asthma, and HTN p/w  worsening shortness of breath, wheezing, productive yellow cough unresponsive to o/p steroids and nebs .     # Exacerbation of persistent asthma,/ bronchiectasis   asthma exacerbation in setting of influenza /corona virus   CT chest negative for PNA  RVP positive for corona virus/ /influenza   -c/w albuterol/atrovent nebs q4h holding 2am dose  -c/w Pulmicort nebs 0.5mg 2 times daily  -c/w singular 10mg daily  -c/w supplemental 02, goal 02> 92%, monitor.   -off abx  : no pna      f/u sputum culture : sent  complete tamifu    steroid taper per pul     #Bronchiectasis.  Plan: Mucus/secretions are present within several bronchi within both lower lobes  -c/w supportive care.     # Lung nodule. Plan: 0.5 cm solid nodule in the right lower lobe is unchanged when compared to previous exam  -outpatient followup for serial CTs      # HTN cont meds   monitor

## 2019-03-02 NOTE — PROGRESS NOTE ADULT - REASON FOR ADMISSION
cough and sob

## 2019-03-26 ENCOUNTER — RESULT REVIEW (OUTPATIENT)
Age: 74
End: 2019-03-26

## 2019-09-19 ENCOUNTER — APPOINTMENT (OUTPATIENT)
Dept: FAMILY MEDICINE | Facility: CLINIC | Age: 74
End: 2019-09-19

## 2019-10-16 ENCOUNTER — APPOINTMENT (OUTPATIENT)
Dept: FAMILY MEDICINE | Facility: CLINIC | Age: 74
End: 2019-10-16
Payer: MEDICARE

## 2019-10-16 VITALS
HEIGHT: 65 IN | WEIGHT: 188 LBS | OXYGEN SATURATION: 98 % | HEART RATE: 87 BPM | SYSTOLIC BLOOD PRESSURE: 154 MMHG | BODY MASS INDEX: 31.32 KG/M2 | DIASTOLIC BLOOD PRESSURE: 76 MMHG | TEMPERATURE: 97.9 F

## 2019-10-16 VITALS — SYSTOLIC BLOOD PRESSURE: 134 MMHG | DIASTOLIC BLOOD PRESSURE: 70 MMHG

## 2019-10-16 DIAGNOSIS — Z23 ENCOUNTER FOR IMMUNIZATION: ICD-10-CM

## 2019-10-16 PROCEDURE — G0008: CPT

## 2019-10-16 PROCEDURE — 90686 IIV4 VACC NO PRSV 0.5 ML IM: CPT

## 2019-10-16 PROCEDURE — 99204 OFFICE O/P NEW MOD 45 MIN: CPT | Mod: 25

## 2019-10-16 RX ORDER — ALBUTEROL SULFATE 90 UG/1
108 (90 BASE) AEROSOL, METERED RESPIRATORY (INHALATION)
Qty: 1 | Refills: 3 | Status: ACTIVE | COMMUNITY
Start: 2019-10-16

## 2019-10-16 NOTE — HISTORY OF PRESENT ILLNESS
[FreeTextEntry1] : New comprehensive [de-identified] : Patient is a 74-year-old female, who presents today to establish himself as a patient medical history is significant for chronic obstructive pulmonary disease cared for by pulmonology. Hypertension borderline diabetes, hypercholesterolemia. Patient is awake, alert, and oriented x3. She is in no acute distress, calm and cooperative.\par \par Patient denies any chest pain, shortness of breath, nausea, or vomiting. She does admit to mild congestion.

## 2019-10-16 NOTE — PHYSICAL EXAM
[No Acute Distress] : no acute distress [Well Nourished] : well nourished [Well Developed] : well developed [Well-Appearing] : well-appearing [Normal Voice/Communication] : normal voice/communication [Normal Sclera/Conjunctiva] : normal sclera/conjunctiva [PERRL] : pupils equal round and reactive to light [EOMI] : extraocular movements intact [Normal Outer Ear/Nose] : the outer ears and nose were normal in appearance [Normal Oropharynx] : the oropharynx was normal [Normal TMs] : both tympanic membranes were normal [Normal Nasal Mucosa] : the nasal mucosa was normal [No JVD] : no jugular venous distention [No Lymphadenopathy] : no lymphadenopathy [Supple] : supple [Thyroid Normal, No Nodules] : the thyroid was normal and there were no nodules present [No Respiratory Distress] : no respiratory distress  [No Accessory Muscle Use] : no accessory muscle use [Clear to Auscultation] : lungs were clear to auscultation bilaterally [Normal Rate] : normal rate  [Regular Rhythm] : with a regular rhythm [Normal S1, S2] : normal S1 and S2 [No Murmur] : no murmur heard [No Carotid Bruits] : no carotid bruits [No Abdominal Bruit] : a ~M bruit was not heard ~T in the abdomen [No Varicosities] : no varicosities [Pedal Pulses Present] : the pedal pulses are present [No Edema] : there was no peripheral edema [No Palpable Aorta] : no palpable aorta [No Extremity Clubbing/Cyanosis] : no extremity clubbing/cyanosis [Soft] : abdomen soft [Non Tender] : non-tender [Non-distended] : non-distended [No Masses] : no abdominal mass palpated [No HSM] : no HSM [Normal Bowel Sounds] : normal bowel sounds [No Hernias] : no hernias [Normal Supraclavicular Nodes] : no supraclavicular lymphadenopathy [Normal Posterior Cervical Nodes] : no posterior cervical lymphadenopathy [Normal Anterior Cervical Nodes] : no anterior cervical lymphadenopathy [No CVA Tenderness] : no CVA  tenderness [No Spinal Tenderness] : no spinal tenderness [No Joint Swelling] : no joint swelling [Grossly Normal Strength/Tone] : grossly normal strength/tone [No Rash] : no rash [No Skin Lesions] : no skin lesions [Coordination Grossly Intact] : coordination grossly intact [No Focal Deficits] : no focal deficits [Normal Gait] : normal gait [Deep Tendon Reflexes (DTR)] : deep tendon reflexes were 2+ and symmetric [Speech Grossly Normal] : speech grossly normal [Normal Affect] : the affect was normal [Alert and Oriented x3] : oriented to person, place, and time [Normal Mood] : the mood was normal [Normal Insight/Judgement] : insight and judgment were intact [de-identified] : gyn [FreeTextEntry1] : GI [de-identified] : GYN [de-identified] : forgetful

## 2019-10-16 NOTE — HEALTH RISK ASSESSMENT
[Very Good] : ~his/her~  mood as very good [No] : In the past 12 months have you used drugs other than those required for medical reasons? No [No falls in past year] : Patient reported no falls in the past year [0] : 2) Feeling down, depressed, or hopeless: Not at all (0) [Patient reported mammogram was normal] : Patient reported mammogram was normal [Patient reported colonoscopy was normal] : Patient reported colonoscopy was normal [None] : None [With Significant Other] : lives with significant other [Retired] : retired [High School] : high school [] :  [# Of Children ___] : has [unfilled] children [Feels Safe at Home] : Feels safe at home [Fully functional (bathing, dressing, toileting, transferring, walking, feeding)] : Fully functional (bathing, dressing, toileting, transferring, walking, feeding) [Fully functional (using the telephone, shopping, preparing meals, housekeeping, doing laundry, using] : Fully functional and needs no help or supervision to perform IADLs (using the telephone, shopping, preparing meals, housekeeping, doing laundry, using transportation, managing medications and managing finances) [Reports normal functional visual acuity (ie: able to read med bottle)] : Reports normal functional visual acuity [Smoke Detector] : smoke detector [Carbon Monoxide Detector] : carbon monoxide detector [Safety elements used in home] : safety elements used in home [Seat Belt] :  uses seat belt [Sunscreen] : uses sunscreen [Reviewed updated] : Reviewed updated [Designated Healthcare Proxy] : Designated healthcare proxy [Name: ___] : Health Care Proxy's Name: [unfilled]  [Relationship: ___] : Relationship: [unfilled] [Aggressive treatment] : aggressive treatment [] : No [Audit-CScore] : 0 [[id number]] : 0 [Change in mental status noted] : No change in mental status noted [Language] : denies difficulty with language [Behavior] : denies difficulty with behavior [Learning/Retaining New Information] : denies difficulty learning/retaining new information [Handling Complex Tasks] : denies difficulty handling complex tasks [Reasoning] : denies difficulty with reasoning [Spatial Ability and Orientation] : denies difficulty with spatial ability and orientation [Sexually Active] : not sexually active [High Risk Behavior] : no high risk behavior [Reports changes in hearing] : Reports no changes in hearing [Reports changes in vision] : Reports no changes in vision [Reports changes in dental health] : Reports no changes in dental health [Travel to Developing Areas] : does not  travel to developing areas [TB Exposure] : is not being exposed to tuberculosis [Caregiver Concerns] : does not have caregiver concerns [MammogramDate] : 07/19 [ColonoscopyDate] : 05/17 [AdvancecareDate] : 10/19

## 2019-10-16 NOTE — ASSESSMENT
[FreeTextEntry1] : Assessment and plan:\par \par 1. Hypertension. Repeat blood pressure 134/70. I will not increase patient's blood pressure medications at this time.\par \par 2. Chronic obstructive pulmonary disease. Patient's lung exam was within normal limits. No change in medical management.\par \par 3. Patient admits to being forgetful and per patient request and also family request. Patient would like formal evaluation from neurology.\par \par 4. Obtain medical records from previous PMD.\par \par 5. Comprehensive blood work has been ordered.\par \par 6. Influenza vaccine administered. Patient will obtain immunology records. Yet, because his final

## 2019-10-18 ENCOUNTER — APPOINTMENT (OUTPATIENT)
Dept: FAMILY MEDICINE | Facility: CLINIC | Age: 74
End: 2019-10-18

## 2019-10-18 ENCOUNTER — APPOINTMENT (OUTPATIENT)
Dept: FAMILY MEDICINE | Facility: CLINIC | Age: 74
End: 2019-10-18
Payer: MEDICARE

## 2019-10-18 VITALS
TEMPERATURE: 98.5 F | RESPIRATION RATE: 16 BRPM | WEIGHT: 188 LBS | BODY MASS INDEX: 31.32 KG/M2 | HEIGHT: 65 IN | HEART RATE: 84 BPM | SYSTOLIC BLOOD PRESSURE: 120 MMHG | OXYGEN SATURATION: 98 % | DIASTOLIC BLOOD PRESSURE: 70 MMHG

## 2019-10-18 PROCEDURE — G0402 INITIAL PREVENTIVE EXAM: CPT

## 2019-10-18 PROCEDURE — 93000 ELECTROCARDIOGRAM COMPLETE: CPT

## 2019-10-18 PROCEDURE — G0403: CPT

## 2019-10-18 PROCEDURE — G0439: CPT

## 2019-10-18 NOTE — COUNSELING
[Use proper foot wear] : Use proper foot wear [No throw rugs] : No throw rugs [Adequate lighting] : Adequate lighting [Use recommended devices] : Use recommended devices [Behavioral health counseling provided] : Behavioral health counseling provided [Engage in a relaxing activity] : Engage in a relaxing activity [Sleep ___ hours/day] : Sleep [unfilled] hours/day [AUDIT-C Screening administered and reviewed] : AUDIT-C Screening administered and reviewed [Plan in advance] : Plan in advance [Potential consequences of obesity discussed] : Potential consequences of obesity discussed [Benefits of weight loss discussed] : Benefits of weight loss discussed [Structured Weight Management Program suggested:] : Structured weight management program suggested [Encouraged to maintain food diary] : Encouraged to maintain food diary [Encouraged to increase physical activity] : Encouraged to increase physical activity [Encouraged to use exercise tracking device] : Encouraged to use exercise tracking device [Target Wt Loss Goal ___] : Weight Loss Goals: Target weight loss goal [unfilled] lbs [Weigh Self Weekly] : weigh self weekly [Decrease Portions] : decrease portions [Keep Food Diary] : keep food diary [____ min/wk Activity] : [unfilled] min/wk activity [Patient motivation] : Patient motivation [Good understanding] : Patient has a good understanding of lifestyle changes and steps needed to achieve self management goal

## 2019-10-18 NOTE — PHYSICAL EXAM
[No Acute Distress] : no acute distress [Well Nourished] : well nourished [Well Developed] : well developed [Well-Appearing] : well-appearing [Normal Sclera/Conjunctiva] : normal sclera/conjunctiva [Normal Voice/Communication] : normal voice/communication [PERRL] : pupils equal round and reactive to light [Normal Outer Ear/Nose] : the outer ears and nose were normal in appearance [EOMI] : extraocular movements intact [Normal TMs] : both tympanic membranes were normal [Normal Oropharynx] : the oropharynx was normal [Normal Nasal Mucosa] : the nasal mucosa was normal [No JVD] : no jugular venous distention [No Lymphadenopathy] : no lymphadenopathy [Supple] : supple [Thyroid Normal, No Nodules] : the thyroid was normal and there were no nodules present [No Respiratory Distress] : no respiratory distress  [No Accessory Muscle Use] : no accessory muscle use [Normal Rate] : normal rate  [Clear to Auscultation] : lungs were clear to auscultation bilaterally [Regular Rhythm] : with a regular rhythm [No Murmur] : no murmur heard [Normal S1, S2] : normal S1 and S2 [No Abdominal Bruit] : a ~M bruit was not heard ~T in the abdomen [No Carotid Bruits] : no carotid bruits [No Varicosities] : no varicosities [No Edema] : there was no peripheral edema [Pedal Pulses Present] : the pedal pulses are present [No Palpable Aorta] : no palpable aorta [Soft] : abdomen soft [No Extremity Clubbing/Cyanosis] : no extremity clubbing/cyanosis [Non Tender] : non-tender [Non-distended] : non-distended [No Masses] : no abdominal mass palpated [No HSM] : no HSM [Normal Bowel Sounds] : normal bowel sounds [No Hernias] : no hernias [Normal Supraclavicular Nodes] : no supraclavicular lymphadenopathy [Normal Posterior Cervical Nodes] : no posterior cervical lymphadenopathy [Normal Anterior Cervical Nodes] : no anterior cervical lymphadenopathy [No Spinal Tenderness] : no spinal tenderness [No CVA Tenderness] : no CVA  tenderness [No Joint Swelling] : no joint swelling [Grossly Normal Strength/Tone] : grossly normal strength/tone [No Rash] : no rash [No Skin Lesions] : no skin lesions [Coordination Grossly Intact] : coordination grossly intact [No Focal Deficits] : no focal deficits [Deep Tendon Reflexes (DTR)] : deep tendon reflexes were 2+ and symmetric [Normal Gait] : normal gait [Speech Grossly Normal] : speech grossly normal [Normal Affect] : the affect was normal [Alert and Oriented x3] : oriented to person, place, and time [Normal Insight/Judgement] : insight and judgment were intact [Normal Mood] : the mood was normal [de-identified] : gyn [de-identified] : GYN [FreeTextEntry1] : GI [de-identified] : forgetful

## 2019-10-18 NOTE — HISTORY OF PRESENT ILLNESS
[FreeTextEntry1] : Annual wellness exam [de-identified] : Patient presents today for annual wellness exam patient states that she is in her usual state of health without complaints. She will be having blood work done as an outpatient

## 2019-10-18 NOTE — ASSESSMENT
[FreeTextEntry1] : Assessment and plan:\par \par Comprehensive health maintenance, physical exam, stable. No acute findings.\par \par Comprehensive blood work ordered. Patient will have it done fasting as an outpatient.\par \par Electrocardiogram shows no acute ST-T wave changes.\par \par Patient is forgetful and has a strong family history of early onset dementia, my recommendations are to start Aricept and salsalate, possibly add Namenda. Patient is definitely high functioning. She is awake, alert, and oriented x3, but she is forgetful. She does write everything down. Before starting medications. Patient and family want a formal evaluation by neurology. I have already given patient referral

## 2019-10-18 NOTE — HEALTH RISK ASSESSMENT
[Very Good] : ~his/her~  mood as very good [] : No [No] : In the past 12 months have you used drugs other than those required for medical reasons? No [No falls in past year] : Patient reported no falls in the past year [0] : 2) Feeling down, depressed, or hopeless: Not at all (0) [[id number]] : 0 [Audit-CScore] : 0 [Patient reported bone density results were abnormal] : Patient reported bone density results were abnormal [Change in mental status noted] : No change in mental status noted [Behavior] : denies difficulty with behavior [Language] : denies difficulty with language [Reasoning] : denies difficulty with reasoning [Learning/Retaining New Information] : denies difficulty learning/retaining new information [Handling Complex Tasks] : denies difficulty handling complex tasks [None] : None [Spatial Ability and Orientation] : denies difficulty with spatial ability and orientation [With Family] : lives with family [Retired] : retired [High School] : high school [] :  [Sexually Active] : not sexually active [High Risk Behavior] : no high risk behavior [Feels Safe at Home] : Feels safe at home [Fully functional (bathing, dressing, toileting, transferring, walking, feeding)] : Fully functional (bathing, dressing, toileting, transferring, walking, feeding) [Reports changes in hearing] : Reports no changes in hearing [Fully functional (using the telephone, shopping, preparing meals, housekeeping, doing laundry, using] : Fully functional and needs no help or supervision to perform IADLs (using the telephone, shopping, preparing meals, housekeeping, doing laundry, using transportation, managing medications and managing finances) [Reports changes in vision] : Reports no changes in vision [Reports normal functional visual acuity (ie: able to read med bottle)] : Reports normal functional visual acuity [Reports changes in dental health] : Reports no changes in dental health [Smoke Detector] : smoke detector [Safety elements used in home] : safety elements used in home [Seat Belt] :  uses seat belt [Travel to Developing Areas] : does not  travel to developing areas [Sunscreen] : uses sunscreen [TB Exposure] : is not being exposed to tuberculosis [Caregiver Concerns] : does not have caregiver concerns [BoneDensityComments] : Osteopenia [de-identified] : forgetful [BoneDensityDate] : 07/19

## 2019-10-21 LAB
ALBUMIN SERPL ELPH-MCNC: 4.3 G/DL
ALP BLD-CCNC: 97 U/L
ALT SERPL-CCNC: 12 U/L
ANION GAP SERPL CALC-SCNC: 14 MMOL/L
APPEARANCE: ABNORMAL
AST SERPL-CCNC: 14 U/L
BACTERIA: NEGATIVE
BASOPHILS # BLD AUTO: 0.09 K/UL
BASOPHILS NFR BLD AUTO: 1.3 %
BILIRUB SERPL-MCNC: 1.4 MG/DL
BILIRUBIN URINE: NEGATIVE
BLOOD URINE: NEGATIVE
BUN SERPL-MCNC: 18 MG/DL
CALCIUM SERPL-MCNC: 9.3 MG/DL
CHLORIDE SERPL-SCNC: 103 MMOL/L
CHOLEST SERPL-MCNC: 244 MG/DL
CHOLEST/HDLC SERPL: 3.5 RATIO
CO2 SERPL-SCNC: 25 MMOL/L
COLOR: ABNORMAL
CREAT SERPL-MCNC: 0.88 MG/DL
CREAT SPEC-SCNC: 150 MG/DL
EOSINOPHIL # BLD AUTO: 0.15 K/UL
EOSINOPHIL NFR BLD AUTO: 2.1 %
ESTIMATED AVERAGE GLUCOSE: 143 MG/DL
GLUCOSE QUALITATIVE U: NEGATIVE
GLUCOSE SERPL-MCNC: 147 MG/DL
HBA1C MFR BLD HPLC: 6.6 %
HCT VFR BLD CALC: 45.8 %
HDLC SERPL-MCNC: 69 MG/DL
HGB BLD-MCNC: 14.7 G/DL
HYALINE CASTS: 1 /LPF
IMM GRANULOCYTES NFR BLD AUTO: 0.6 %
KETONES URINE: NEGATIVE
LDLC SERPL CALC-MCNC: 158 MG/DL
LEUKOCYTE ESTERASE URINE: NEGATIVE
LYMPHOCYTES # BLD AUTO: 1.54 K/UL
LYMPHOCYTES NFR BLD AUTO: 21.4 %
MAN DIFF?: NORMAL
MCHC RBC-ENTMCNC: 27.4 PG
MCHC RBC-ENTMCNC: 32.1 GM/DL
MCV RBC AUTO: 85.3 FL
MICROALBUMIN 24H UR DL<=1MG/L-MCNC: <1.2 MG/DL
MICROALBUMIN/CREAT 24H UR-RTO: NORMAL MG/G
MICROSCOPIC-UA: NORMAL
MONOCYTES # BLD AUTO: 0.57 K/UL
MONOCYTES NFR BLD AUTO: 7.9 %
NEUTROPHILS # BLD AUTO: 4.79 K/UL
NEUTROPHILS NFR BLD AUTO: 66.7 %
NITRITE URINE: NEGATIVE
PH URINE: 5.5
PLATELET # BLD AUTO: 293 K/UL
POTASSIUM SERPL-SCNC: 4.6 MMOL/L
PROT SERPL-MCNC: 6.2 G/DL
PROTEIN URINE: NEGATIVE
RBC # BLD: 5.37 M/UL
RBC # FLD: 13.5 %
RED BLOOD CELLS URINE: 3 /HPF
SODIUM SERPL-SCNC: 142 MMOL/L
SPECIFIC GRAVITY URINE: 1.02
SQUAMOUS EPITHELIAL CELLS: 1 /HPF
T4 FREE SERPL-MCNC: 1.4 NG/DL
TRIGL SERPL-MCNC: 87 MG/DL
TSH SERPL-ACNC: 1.36 UIU/ML
UROBILINOGEN URINE: NORMAL
WBC # FLD AUTO: 7.18 K/UL
WHITE BLOOD CELLS URINE: 1 /HPF

## 2019-12-18 ENCOUNTER — NON-APPOINTMENT (OUTPATIENT)
Age: 74
End: 2019-12-18

## 2019-12-18 ENCOUNTER — INPATIENT (INPATIENT)
Facility: HOSPITAL | Age: 74
LOS: 0 days | Discharge: HOME | DRG: 149 | End: 2019-12-19
Attending: HOSPITALIST | Admitting: HOSPITALIST
Payer: COMMERCIAL

## 2019-12-18 ENCOUNTER — APPOINTMENT (OUTPATIENT)
Age: 74
End: 2019-12-18
Payer: MEDICARE

## 2019-12-18 VITALS
OXYGEN SATURATION: 100 % | WEIGHT: 182 LBS | TEMPERATURE: 97.5 F | DIASTOLIC BLOOD PRESSURE: 70 MMHG | HEART RATE: 71 BPM | SYSTOLIC BLOOD PRESSURE: 110 MMHG | HEIGHT: 65 IN | BODY MASS INDEX: 30.32 KG/M2

## 2019-12-18 VITALS
TEMPERATURE: 99 F | OXYGEN SATURATION: 99 % | SYSTOLIC BLOOD PRESSURE: 146 MMHG | WEIGHT: 184.09 LBS | HEIGHT: 66 IN | RESPIRATION RATE: 17 BRPM | DIASTOLIC BLOOD PRESSURE: 84 MMHG | HEART RATE: 73 BPM

## 2019-12-18 DIAGNOSIS — R42 DIZZINESS AND GIDDINESS: ICD-10-CM

## 2019-12-18 LAB
ALBUMIN SERPL ELPH-MCNC: 3.3 G/DL — SIGNIFICANT CHANGE UP (ref 3.3–5)
ALP SERPL-CCNC: 83 U/L — SIGNIFICANT CHANGE UP (ref 40–120)
ALT FLD-CCNC: 17 U/L — SIGNIFICANT CHANGE UP (ref 10–45)
ANION GAP SERPL CALC-SCNC: 11 MMOL/L — SIGNIFICANT CHANGE UP (ref 5–17)
AST SERPL-CCNC: 16 U/L — SIGNIFICANT CHANGE UP (ref 10–40)
BASOPHILS # BLD AUTO: 0.06 K/UL — SIGNIFICANT CHANGE UP (ref 0–0.2)
BASOPHILS NFR BLD AUTO: 0.8 % — SIGNIFICANT CHANGE UP (ref 0–2)
BILIRUB SERPL-MCNC: 1.5 MG/DL — HIGH (ref 0.2–1.2)
BUN SERPL-MCNC: 16 MG/DL — SIGNIFICANT CHANGE UP (ref 7–23)
CALCIUM SERPL-MCNC: 8.9 MG/DL — SIGNIFICANT CHANGE UP (ref 8.4–10.5)
CHLORIDE SERPL-SCNC: 108 MMOL/L — SIGNIFICANT CHANGE UP (ref 96–108)
CO2 SERPL-SCNC: 26 MMOL/L — SIGNIFICANT CHANGE UP (ref 22–31)
CREAT SERPL-MCNC: 0.98 MG/DL — SIGNIFICANT CHANGE UP (ref 0.5–1.3)
EOSINOPHIL # BLD AUTO: 0.1 K/UL — SIGNIFICANT CHANGE UP (ref 0–0.5)
EOSINOPHIL NFR BLD AUTO: 1.3 % — SIGNIFICANT CHANGE UP (ref 0–6)
GLUCOSE SERPL-MCNC: 166 MG/DL — HIGH (ref 70–99)
HCT VFR BLD CALC: 44.4 % — SIGNIFICANT CHANGE UP (ref 34.5–45)
HGB BLD-MCNC: 14.8 G/DL — SIGNIFICANT CHANGE UP (ref 11.5–15.5)
IMM GRANULOCYTES NFR BLD AUTO: 0.4 % — SIGNIFICANT CHANGE UP (ref 0–1.5)
LYMPHOCYTES # BLD AUTO: 1.6 K/UL — SIGNIFICANT CHANGE UP (ref 1–3.3)
LYMPHOCYTES # BLD AUTO: 21.5 % — SIGNIFICANT CHANGE UP (ref 13–44)
MCHC RBC-ENTMCNC: 28.5 PG — SIGNIFICANT CHANGE UP (ref 27–34)
MCHC RBC-ENTMCNC: 33.3 GM/DL — SIGNIFICANT CHANGE UP (ref 32–36)
MCV RBC AUTO: 85.4 FL — SIGNIFICANT CHANGE UP (ref 80–100)
MONOCYTES # BLD AUTO: 0.5 K/UL — SIGNIFICANT CHANGE UP (ref 0–0.9)
MONOCYTES NFR BLD AUTO: 6.7 % — SIGNIFICANT CHANGE UP (ref 2–14)
NEUTROPHILS # BLD AUTO: 5.14 K/UL — SIGNIFICANT CHANGE UP (ref 1.8–7.4)
NEUTROPHILS NFR BLD AUTO: 69.3 % — SIGNIFICANT CHANGE UP (ref 43–77)
NRBC # BLD: 0 /100 WBCS — SIGNIFICANT CHANGE UP (ref 0–0)
PLATELET # BLD AUTO: 276 K/UL — SIGNIFICANT CHANGE UP (ref 150–400)
POTASSIUM SERPL-MCNC: 3.9 MMOL/L — SIGNIFICANT CHANGE UP (ref 3.5–5.3)
POTASSIUM SERPL-SCNC: 3.9 MMOL/L — SIGNIFICANT CHANGE UP (ref 3.5–5.3)
PROT SERPL-MCNC: 6.5 G/DL — SIGNIFICANT CHANGE UP (ref 6–8.3)
RBC # BLD: 5.2 M/UL — SIGNIFICANT CHANGE UP (ref 3.8–5.2)
RBC # FLD: 13.4 % — SIGNIFICANT CHANGE UP (ref 10.3–14.5)
SODIUM SERPL-SCNC: 145 MMOL/L — SIGNIFICANT CHANGE UP (ref 135–145)
TROPONIN I SERPL-MCNC: <.017 NG/ML — LOW (ref 0.02–0.06)
WBC # BLD: 7.43 K/UL — SIGNIFICANT CHANGE UP (ref 3.8–10.5)
WBC # FLD AUTO: 7.43 K/UL — SIGNIFICANT CHANGE UP (ref 3.8–10.5)

## 2019-12-18 PROCEDURE — 70496 CT ANGIOGRAPHY HEAD: CPT | Mod: 26

## 2019-12-18 PROCEDURE — 70498 CT ANGIOGRAPHY NECK: CPT | Mod: 26

## 2019-12-18 PROCEDURE — 70450 CT HEAD/BRAIN W/O DYE: CPT | Mod: 26,59

## 2019-12-18 PROCEDURE — 93010 ELECTROCARDIOGRAM REPORT: CPT

## 2019-12-18 PROCEDURE — 93000 ELECTROCARDIOGRAM COMPLETE: CPT

## 2019-12-18 PROCEDURE — 99285 EMERGENCY DEPT VISIT HI MDM: CPT

## 2019-12-18 PROCEDURE — 99214 OFFICE O/P EST MOD 30 MIN: CPT

## 2019-12-18 RX ORDER — SODIUM CHLORIDE 9 MG/ML
1000 INJECTION INTRAMUSCULAR; INTRAVENOUS; SUBCUTANEOUS ONCE
Refills: 0 | Status: COMPLETED | OUTPATIENT
Start: 2019-12-18 | End: 2019-12-18

## 2019-12-18 RX ORDER — ACETAMINOPHEN 500 MG
650 TABLET ORAL EVERY 6 HOURS
Refills: 0 | Status: DISCONTINUED | OUTPATIENT
Start: 2019-12-18 | End: 2019-12-19

## 2019-12-18 RX ORDER — ATORVASTATIN CALCIUM 80 MG/1
10 TABLET, FILM COATED ORAL AT BEDTIME
Refills: 0 | Status: DISCONTINUED | OUTPATIENT
Start: 2019-12-18 | End: 2019-12-19

## 2019-12-18 RX ORDER — GUAIFENESIN/DEXTROMETHORPHAN 600MG-30MG
10 TABLET, EXTENDED RELEASE 12 HR ORAL
Refills: 0 | Status: DISCONTINUED | OUTPATIENT
Start: 2019-12-18 | End: 2019-12-19

## 2019-12-18 RX ORDER — ALBUTEROL 90 UG/1
2 AEROSOL, METERED ORAL EVERY 4 HOURS
Refills: 0 | Status: DISCONTINUED | OUTPATIENT
Start: 2019-12-18 | End: 2019-12-19

## 2019-12-18 RX ORDER — MECLIZINE HCL 12.5 MG
12.5 TABLET ORAL
Refills: 0 | Status: DISCONTINUED | OUTPATIENT
Start: 2019-12-18 | End: 2019-12-19

## 2019-12-18 RX ORDER — ENOXAPARIN SODIUM 100 MG/ML
40 INJECTION SUBCUTANEOUS DAILY
Refills: 0 | Status: DISCONTINUED | OUTPATIENT
Start: 2019-12-19 | End: 2019-12-19

## 2019-12-18 RX ORDER — ARFORMOTEROL TARTRATE 15 UG/2ML
15 SOLUTION RESPIRATORY (INHALATION) EVERY 12 HOURS
Refills: 0 | Status: DISCONTINUED | OUTPATIENT
Start: 2019-12-18 | End: 2019-12-19

## 2019-12-18 RX ORDER — IPRATROPIUM/ALBUTEROL SULFATE 18-103MCG
3 AEROSOL WITH ADAPTER (GRAM) INHALATION ONCE
Refills: 0 | Status: COMPLETED | OUTPATIENT
Start: 2019-12-18 | End: 2019-12-18

## 2019-12-18 RX ORDER — LOSARTAN POTASSIUM 100 MG/1
50 TABLET, FILM COATED ORAL DAILY
Refills: 0 | Status: DISCONTINUED | OUTPATIENT
Start: 2019-12-18 | End: 2019-12-19

## 2019-12-18 RX ORDER — ASPIRIN/CALCIUM CARB/MAGNESIUM 324 MG
81 TABLET ORAL DAILY
Refills: 0 | Status: DISCONTINUED | OUTPATIENT
Start: 2019-12-18 | End: 2019-12-19

## 2019-12-18 RX ORDER — BUDESONIDE, MICRONIZED 100 %
0.5 POWDER (GRAM) MISCELLANEOUS EVERY 12 HOURS
Refills: 0 | Status: DISCONTINUED | OUTPATIENT
Start: 2019-12-18 | End: 2019-12-19

## 2019-12-18 RX ADMIN — ATORVASTATIN CALCIUM 10 MILLIGRAM(S): 80 TABLET, FILM COATED ORAL at 22:00

## 2019-12-18 RX ADMIN — ARFORMOTEROL TARTRATE 15 MICROGRAM(S): 15 SOLUTION RESPIRATORY (INHALATION) at 22:44

## 2019-12-18 RX ADMIN — SODIUM CHLORIDE 1000 MILLILITER(S): 9 INJECTION INTRAMUSCULAR; INTRAVENOUS; SUBCUTANEOUS at 18:33

## 2019-12-18 RX ADMIN — SODIUM CHLORIDE 1000 MILLILITER(S): 9 INJECTION INTRAMUSCULAR; INTRAVENOUS; SUBCUTANEOUS at 17:33

## 2019-12-18 RX ADMIN — Medication 81 MILLIGRAM(S): at 21:08

## 2019-12-18 RX ADMIN — Medication 0.5 MILLIGRAM(S): at 20:34

## 2019-12-18 NOTE — ED ADULT NURSE NOTE - OBJECTIVE STATEMENT
pt sent in from [facility] with C/O dizziness. Pt states she has been having dizziness for about 1 week but worsening today. pt with PMH of HTN. Pt denies any n/v/d, chest pain, SOB, blurred vision, headache, dizziness, fever, chills or any other complaint at this time. pt sent in from [facility] with C/O dizziness. Pt states she has been having dizziness for about 1 week but worsening today. pt with PMH of HTN and bronchiectasis. Pt denies any n/v/d, chest pain, SOB, blurred vision, headache, dizziness, fever, chills or any other complaint at this time.

## 2019-12-18 NOTE — PATIENT PROFILE ADULT - NSPROHMSYMPCOND_GEN_A_NUR
Medicine Progress Note


Date & Time of Visit:


Aug 10, 2018 at 11:11.


Subjective


seen resting in bedside chair, comfortable


states she feels fine overall


denies pain on the right foot


denies chest pain, dyspnea, palpitations, dizziness


no other symptoms





Objective





Last 8 Hrs








  Date Time  Temp Pulse Resp B/P (MAP) Pulse Ox O2 Delivery O2 Flow Rate FiO2


 


8/10/18 08:01 36.8 71 15 144/74 (97) 91 Room Air  


 


8/10/18 08:00      Room Air  


 


8/10/18 08:00     91 Room Air  


 


8/10/18 07:35 36.8 71 16 121/71 (88) 90 Room Air  








Physical Exam:





General- oriented x 2, not in distress, speaks in sentences with no effort





Eyes- anicteric





Lungs- clear breath sounds bilaterally


          no rales/wheezes





Heart- regular rhythm; no murmur, normal rate 





Abdomen- normal bowel sounds, soft, nontender





Extremities- right lower leg: dressing in place


                 left: no pretibial edema, no calf tenderness





Neuro- alert, oriented x 2


          no gross focal neuro deficits





Skin- warm & dry


Laboratory Results:





Last 24 Hours








Test


  8/9/18


11:15 8/9/18


17:10 8/9/18


21:00 8/10/18


06:53


 


Bedside Glucose 130 mg/dl  259 mg/dl  204 mg/dl  


 


Creatinine    0.96 mg/dl 


 


Est Creatinine Clear Calc


Drug Dose 


  


  


  48.0 ml/min 


 


 


Estimated GFR (


American) 


  


  


  67.1 


 


 


Estimated GFR (Non-


American 


  


  


  57.9 


 


 


Test


  8/10/18


08:00 


  


  


 


 


Bedside Glucose 324 mg/dl    











Assessment & Plan





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


s/p Surgery by Dr. Moseley 8/7/18


   1.  Right great toe partial amputation to the level of the mid proximal


   phalanx.


   2.  Removal of fifth toe nail plate.


   3.  Irrigation and debridement of the fifth toe nail bed.


   4.  Irrigation and debridement of right heel including skin, subcutaneous


   fat, and fascia.





doing well post op


cleared for discharge by Ortho





Per Ortho:


   Daily dressing changes to the right foot.  Keep an eye on her heel as well. (

breakdown?)


   Weightbearing on the heel only with protective shoe.


   Ok to shower if you keep the dressing covered with a waterproof covering.


   Call if you have increased temp of 101.5 or greater, increased redness, 

increased swelling or drainage.  


   Follow up with Dr Moseley in 14 days from the day of surgery.  Call for an 

appointment.  








Outpatient cultures grew MRSA and Pseudomonas.


   on Dapto + Ceftolozane/Tazobactam Day 8


   ID consulted Dr. Guevara


   recommend to continue Daptomycin and Ceftolozane/Tazobactam IV x 7 more days


   Midline placement ordered





continue PT/OT at [facility]








CAD


No anginal symptoms.


Continue lasix, aspirin, metoprolol, amlodipine.





ATRIAL FIBRILLATION


Continue metoprolol, Eliquis


  


PULMONARY NODULES, RIGHT MIDDLE LOBE INFILTRATE


no respiratory symptoms


per patient's daughter Rupinder, patient is following with [facility] in [facility] for this 


Pulmonary consulted, recommend outpatient follow up


discussed with Dr. Bragg


- continue follow up with [facility] 





HYPERTENSION


increased Amlodipine from 2.5 to 5mg po daily


Continue metoprolol


BP improving


monitor





PERIPHERAL VASCULAR DISEASE


Interventional Cardiology consulted


continue Pletal





DM TYPE 1


Insulin pump.


Pharmacy consulted, recommendations:


PLAN FOR INPATIENT GLYCEMIC CONTROL: 


* Basal insulin: no change 


 * Per SQ insulin pump outpatient settings





* Bolus insulin: nursing to administer SQ NovoLog for meal time coverage since 

patient cannot bolus herself from pump.


 * NovoLog per scale AC or Q6hrs while NPO


 * Goal Range:  Low 100 mg/dL - High 160 mg/dL


 * Correction Factor:  40 mg/dL/unit


 * Nutritional / Prandial insulin per carb ratio of  1 unit per 13 grams CHO 

consumed


      *** NO Novolog coverage at HS, as this tends to make patient hypoglycemic 

in the morning  (BSG should still be checked) ***








DISCHARGE PLANNING: 


* Patient's A1c (9.4%) indicates slightly suboptimal glycemic control.


* Expect that patient may resume home pump settings on discharge, as long as 

she will be able to operate her pump effectively.


 * Patient's daughter generally manages her pump.  Unsure that pump will be 

manageable at [facility]?


* Expect that patient's basal rate is reasonable, but that she may require 

slightly tighter CF/CR parameters.  


* It does sound like patient experiences episodes of hypoglycemia at home (

mainly in the morning), so would continue to omit HS coverage.


* If patient is NOT able to continue pump at [facility], consider:


 * Lantus 12 units SQ qAM


 * Novolog ~3-5 units with each meal





DYSLIPIDEMIA


Hold statin while receiving daptomycin.





DEMENTIA


Monitor for delirium.





MRSA


Contact precautions.





VTE PROPHYLAXIS


- on Eliquis already for A fib


 


RESUSCITATION STATUS


DNR





DISPOSITION


transition to [facility]


Follow up with Dr Moseley in 14 days from the day of surgery.  Call for an 

appointment.  [phone]


Family Medicine follow-up with Dr. Mallory.


Current Inpatient Medications:





Current Inpatient Medications








 Medications


  (Trade)  Dose


 Ordered  Sig/Amparo


 Route  Start Time


 Stop Time Status Last Admin


Dose Admin


 


 Acetaminophen


  (Tylenol Tab)  1,000 mg  Q6  PRN


 PO  8/2/18 18:00


 9/1/18 17:59  8/8/18 22:03


1,000 MG


 


 Apixaban


  (Eliquis)  5 mg  BID


 PO  8/2/18 21:00


 9/1/18 20:59 Future hold 8/10/18 08:51


5 MG


 


 Aspirin


  (Ecotrin Tab)  81 mg  DAILY


 PO  8/3/18 09:00


 9/2/18 08:59 Future hold 8/10/18 08:50


81 MG


 


 Cilostazol


  (Pletal Tab)  100 mg  BID


 PO  8/2/18 21:00


 9/1/18 20:59 Future hold 8/10/18 08:50


100 MG


 


 Citalopram


 Hydrobromide


  (celeXA TAB)  20 mg  DAILY


 PO  8/3/18 09:00


 9/2/18 08:59  8/10/18 08:54


20 MG


 


 Docusate Sodium


  (coLACE CAP)  100 mg  BID


 PO  8/2/18 21:00


 9/1/18 20:59  8/10/18 08:51


100 MG


 


 Donepezil HCl


  (Aricept Tab)  10 mg  DAILY


 PO  8/3/18 09:00


 9/2/18 08:59  8/10/18 08:54


10 MG


 


 Levothyroxine


 Sodium


  (Synthroid Tab)  88 mcg  DAILYBB


 PO  8/3/18 06:00


 9/2/18 06:59  8/10/18 05:33


88 MCG


 


 Memantine


  (Namenda Tab)  10 mg  BID


 PO  8/2/18 21:00


 9/1/18 20:59  8/10/18 08:52


10 MG


 


 Multivitamins


  (Multivitamin


 Tab)  1 tab  DAILY


 PO  8/3/18 09:00


 9/2/18 08:59  8/10/18 08:54


1 TAB


 


 Pantoprazole


 Sodium


  (Protonix Tab)  40 mg  DAILY


 PO  8/3/18 09:00


 9/2/18 08:59  8/10/18 08:54


40 MG


 


 Metoprolol


 Tartrate


  (Lopressor Tab)  25 mg  BID


 PO  8/2/18 21:00


 9/1/18 20:59  8/10/18 08:54


25 MG


 


 Mirtazapine


  (Remeron Tab)  7.5 mg  HS


 PO  8/2/18 21:00


 9/1/18 20:59  8/9/18 21:29


7.5 MG


 


 Calcium/Vitamin D


  (Caltrate Plus


 Tab)  1 tab  DAILY


 PO  8/3/18 09:00


 9/2/18 08:59  8/10/18 08:53


1 TAB


 


 Polyethylene


  (Miralax Powder


 Packet)  17 gm  DAILY


 PO  8/3/18 09:00


 9/2/18 08:59 Future hold 8/10/18 08:51


17 GM


 


 Miscellaneous


 Information


  (Consult


 Glycemic


 Management


 Pharmacy)  1 ea  UD  PRN


 N/A  8/2/18 19:42


 9/1/18 19:41   


 


 


 Daptomycin


  (Consult)  1 ea  UD  PRN


 N/A  8/2/18 20:15


 9/1/18 20:14   


 


 


 Daptomycin 400 mg/


 Syringe  8 ml @ 4


 mls/min  Q24H


 IV  8/2/18 22:00


 9/13/18 21:59  8/9/18 21:30


4 MLS/MIN


 


 Insulin Aspart


  (novoLOG INSULIN


 PUMP)  1 ea  ACHS


 N/A  8/2/18 21:00


 9/1/18 20:59 Future hold 8/10/18 08:00


1 EA


 


 Insulin Aspart


  (novoLOG ASPART)  SLIDING


 SCALE  PRN  PRN


 SC  8/2/18 20:45


 9/1/18 20:44 Future hold  


 


 


 Glucose


  (Glucose 40% Gel)  15-30


 GRAMS 15


 GRAMS...  UD  PRN


 PO  8/2/18 20:45


 9/1/18 20:44   


 


 


 Glucose


  (Glucose Chew


 Tab)  4-8


 Tablets 4


 Tabl...  UD  PRN


 PO  8/2/18 20:45


 9/1/18 20:44   


 


 


 Glucagon


  (Glucagon Inj)  1 mg  UD  PRN


 IM  8/2/18 20:45


 9/1/18 20:44   


 


 


 Dextrose


  (Dextrose 50%


 50ML Syringe)  25-50ML


 25ML FOR


 ...  UD  PRN


 IV  8/2/18 20:45


 9/1/18 20:44   


 


 


 Carbohydrates


  (Carbohydrates


 For Hypoglycemia)  15-30 GRAMS


 15 grams if


 BSG 54-69...  UD  PRN


 PO  8/2/18 20:45


 9/1/18 20:44  8/5/18 04:23


15 GM


 


 Ceftolozane/


 Tazobactam 1.5 gm/


 Dextrose  111.4 ml @ 


 111.4 mls/


 hr  Q8H


 IV  8/3/18 16:00


 8/13/18 15:59  8/10/18 09:02


111.4 MLS/HR


 


 Diphenhydramine


 HCl


  (Benadryl Cap)  25 mg  BID  PRN


 PO  8/3/18 16:30


 9/2/18 16:29   


 


 


 Insulin Aspart


  (novoLOG ASPART)  **SLIDING


 SCALE**


 **G...  AC


 SC  8/6/18 18:00


 9/5/18 17:59  8/10/18 09:01


7 UNITS


 


 Sodium Chloride  1,000 ml @ 


 15 mls/hr  Q24H


 IV  8/7/18 17:00


 9/6/18 16:59   


 


 


 Morphine Sulfate


  (MoRPHine


 SULFATE INJ)  3 mg  Q3HWA  PRN


 IV  8/8/18 00:00


 8/22/18 00:00  8/8/18 23:43


3 MG


 


 Tramadol HCl


  (Ultram Tab)  50 mg  Q6H  PRN


 PO  8/8/18 08:00


 9/7/18 07:59  8/9/18 21:36


50 MG


 


 Amlodipine


 Besylate


  (Norvasc Tab)  5 mg  DAILY


 PO  8/9/18 09:00


 9/2/18 08:59  8/10/18 08:52


5 MG


 


 Furosemide


  (Lasix Tab)  40 mg  QAM


 PO  8/9/18 09:00


 9/8/18 08:59  8/9/18 10:20


40 MG none

## 2019-12-18 NOTE — HEALTH RISK ASSESSMENT
[No falls in past year] : Patient reported no falls in the past year [No] : In the past 12 months have you used drugs other than those required for medical reasons? No [0] : 2) Feeling down, depressed, or hopeless: Not at all (0) [] : No [[id number]] : 0

## 2019-12-18 NOTE — PHYSICAL EXAM
[Well Developed] : well developed [No Acute Distress] : no acute distress [Well Nourished] : well nourished [Normal Sclera/Conjunctiva] : normal sclera/conjunctiva [PERRL] : pupils equal round and reactive to light [Well-Appearing] : well-appearing [Normal Outer Ear/Nose] : the outer ears and nose were normal in appearance [Normal Oropharynx] : the oropharynx was normal [EOMI] : extraocular movements intact [No Lymphadenopathy] : no lymphadenopathy [No JVD] : no jugular venous distention [No Respiratory Distress] : no respiratory distress  [Supple] : supple [Thyroid Normal, No Nodules] : the thyroid was normal and there were no nodules present [Clear to Auscultation] : lungs were clear to auscultation bilaterally [No Accessory Muscle Use] : no accessory muscle use [Normal Rate] : normal rate  [Regular Rhythm] : with a regular rhythm [Normal S1, S2] : normal S1 and S2 [No Murmur] : no murmur heard [No Abdominal Bruit] : a ~M bruit was not heard ~T in the abdomen [No Carotid Bruits] : no carotid bruits [No Varicosities] : no varicosities [Pedal Pulses Present] : the pedal pulses are present [No Edema] : there was no peripheral edema [No Palpable Aorta] : no palpable aorta [No Extremity Clubbing/Cyanosis] : no extremity clubbing/cyanosis [Soft] : abdomen soft [Non Tender] : non-tender [No Masses] : no abdominal mass palpated [Non-distended] : non-distended [No HSM] : no HSM [Normal Posterior Cervical Nodes] : no posterior cervical lymphadenopathy [Normal Anterior Cervical Nodes] : no anterior cervical lymphadenopathy [Normal Bowel Sounds] : normal bowel sounds [Grossly Normal Strength/Tone] : grossly normal strength/tone [No Spinal Tenderness] : no spinal tenderness [No Joint Swelling] : no joint swelling [No CVA Tenderness] : no CVA  tenderness [Coordination Grossly Intact] : coordination grossly intact [No Rash] : no rash [No Focal Deficits] : no focal deficits [Deep Tendon Reflexes (DTR)] : deep tendon reflexes were 2+ and symmetric [Normal Gait] : normal gait [Normal Affect] : the affect was normal [Normal Insight/Judgement] : insight and judgment were intact

## 2019-12-18 NOTE — ED PROVIDER NOTE - ATTENDING CONTRIBUTION TO CARE
74 yr old female with hx of pre DM, HTN, HLD presents c/o intermittent dizziness for the last week. Pt reports worsening this morning, when waking up and getting out of bed, " I felt imbalance and grabbed the wall", resolved. No symptoms currently. No chest pain, sob, weakness, numbness or any other symptoms.   Pt seen by pmd- Dr. Garica and sent to ED for further eval. Denies any hx of similar symptoms.  neurologically intact on exam, unremarkable exam at this time  ekg reviewed, ct head showing no acute findings, labs reviewed. spoke to Dr. Garcia, who would like pt to be admitted for further workup, neurology called for consult and will see pt, Dr. Lo accepted pt. Pt admitted to medicine on tele. Pt agrees with plan.  Dr. Waddell:  I have reviewed and discussed with the PA/ resident the case specifics, including the history, physical assessment, evaluation, conclusion, laboratory results, and medical plan. I agree with the contents, and conclusions. I have personally examined, and interviewed the patient.

## 2019-12-18 NOTE — ED ADULT NURSE NOTE - NSIMPLEMENTINTERV_GEN_ALL_ED
Implemented All Fall Risk Interventions:  [place] to call system. Call bell, personal items and telephone within reach. Instruct patient to call for assistance. Room bathroom lighting operational. Non-slip footwear when patient is off stretcher. Physically safe environment: no spills, clutter or unnecessary equipment. Stretcher in lowest position, wheels locked, appropriate side rails in place. Provide visual cue, wrist band, yellow gown, etc. Monitor gait and stability. Monitor for mental status changes and reorient to person, place, and time. Review medications for side effects contributing to fall risk. Reinforce activity limits and safety measures with patient and family.

## 2019-12-18 NOTE — PLAN
[FreeTextEntry1] : DIZZINESS/MEMORY LOSS: NEUROLOGY AND CARDIOLOGY REFERRAL.\par recommend to go to ER for worsening s/s. \par HTN stable. continue BP med.\par  DM-2: re heck labs. take consistent carb diet.\par HLD: Statin . low chol. diet.

## 2019-12-18 NOTE — H&P ADULT - NEUROLOGICAL DETAILS
responds to pain/alert and oriented x 3/responds to verbal commands/sensation intact/deep reflexes intact/cranial nerves intact

## 2019-12-18 NOTE — H&P ADULT - NSICDXPASTMEDICALHX_GEN_ALL_CORE_FT
PAST MEDICAL HISTORY:  Antibody Deficiency Syndrome     Asthma  /Chronic brochitiss     Diverticulitis of colon (without mention of hemorrhage)     History of Hypertension     Hypercholesterolemia     Pneumonia  2011

## 2019-12-18 NOTE — HISTORY OF PRESENT ILLNESS
[FreeTextEntry8] : c/o short term memory loss for 1 year. c/o dizziness for a week, worse on changing position in the morning. She feels tired. Here with daughter. No chest pain, SOb, fever, chills, nausea. No falls. No prior episode.

## 2019-12-18 NOTE — ED PROVIDER NOTE - CLINICAL SUMMARY MEDICAL DECISION MAKING FREE TEXT BOX
74 yr old female with hx of pre DM, HTN, HLD presents c/o intermittent dizziness for the last week. Pt reports worsening this morning, when waking up and getting out of bed, " I felt imbalance and grabbed the wall", resolved. No symptoms currently. No chest pain, sob, weakness, numbness or any other symptoms.   Pt seen by pmd- Dr. Garcia and sent to ED for further eval. Denies any hx of similar symptoms. 74 yr old female with hx of pre DM, HTN, HLD presents c/o intermittent dizziness for the last week. Pt reports worsening this morning, when waking up and getting out of bed, " I felt imbalance and grabbed the wall", resolved. No symptoms currently. No chest pain, sob, weakness, numbness or any other symptoms.   Pt seen by pmd- Dr. Garcia and sent to ED for further eval. Denies any hx of similar symptoms.  neurologically intact on exam, unremarkable exam at this time  ekg reviewed, ct head showing no acute findings, labs reviewed. Dr. Waddell spoke to Dr. Garcia, who would like pt to be admitted for further workup, neurology called for consult and will see pt, Dr. Lo accepted pt. Pt admitted to medicine on tele. Pt agrees with plan.

## 2019-12-18 NOTE — ED PROVIDER NOTE - CARE PLAN
Principal Discharge DX:	Dizziness Principal Discharge DX:	Dizziness  Secondary Diagnosis:	Gait abnormality

## 2019-12-18 NOTE — ED PROVIDER NOTE - OBJECTIVE STATEMENT
74 yr old female with hx of pre DM, HTN, HLD presents c/o intermittent dizziness for the last week. Pt reports worsening this morning, when waking up and getting out of bed, " I felt imbalance and grabbed the wall", resolved. No symptoms currently. No chest pain, sob, weakness, numbness or any other symptoms.   Pt seen by pmd- Dr. Garcia and sent to ED for further eval. Denies any hx of similar symptoms.

## 2019-12-18 NOTE — H&P ADULT - HISTORY OF PRESENT ILLNESS
74 yr old female HTN, HLD  borderline DM, presents to the ED with c/o intermittent dizziness for a week. Pt states seems to have gotten worse today because when she got out of bed, she almost lost balance and had to grab and lean on the wall to move and walk.  She denies headache, blurred vision, tinnitus, numbness, syncope, chest pain, dyspnea.  Pt states she has a chronic mild cough, and uses nebs regularly.    Was advised by PMD to go to the ED for eval. 74 yr old female HTN, HLD  borderline DM, presents to the ED with c/o intermittent dizziness for a week. Pt states seems to have gotten worse today because when she got out of bed, she almost lost balance and had to grab and lean on the wall to move and walk.  She denies headache, nausea, vomiting, blurred vision, tinnitus, numbness, syncope, chest pain, dyspnea.  Pt states she has a chronic mild cough, and uses nebs regularly.    Was advised by PMD to go to the ED for eval.

## 2019-12-18 NOTE — COUNSELING
[Engage in a relaxing activity] : Engage in a relaxing activity [None] : None [Behavioral health counseling provided] : Behavioral health counseling provided [Needs reinforcement, provided] : Patient needs reinforcement on understanding of lifestyle changes and steps needed to achieve self management goal; reinforcement was provided

## 2019-12-18 NOTE — ED ADULT NURSE REASSESSMENT NOTE - NS ED NURSE REASSESS COMMENT FT1
MD pacheco at bedside to examine pt. pt remains on cardiac monitor, will continue to monitor closely.

## 2019-12-18 NOTE — H&P ADULT - ASSESSMENT
74 yr old female HTN, HLD  borderline DM, presents to the ED with c/o intermittent dizziness for a week, worsened today.  Vertigo vs vertebr    CAPRINI SCORE [CLOT]  [x] Age: 61-74 years                                           (2 Points)                 [x ] BMI > 25 Kg/m2                                            (1 Point)                                                                                                                                                         Total Score [  3 ]  Caprini Score 0 - 2:  Low Risk, No VTE Prophylaxis required for most patients, encourage ambulation  Caprini Score 3 - 6:  At Risk, pharmacologic VTE prophylaxis is indicated for most patients (in the absence of a contraindication)  Caprini Score Greater than or = 7:  High Risk, pharmacologic VTE prophylaxis is indicated for most patients (in the absence of a contraindication) 74 yr old female HTN, HLD  borderline DM, presents to the ED with c/o intermittent dizziness for a week, worsened today.  Vertigo in pt with risk factors for vertebrobasilar insuff  ?BPV  Chronic bronchitis, stable    Admit to tele  Will get CT Angio head and neck r/o narrowing, thrombosis  Will initiate ASA 81 mg/day, Meclizine prn  Echo to assess LV fn  Neuro eval- Dr Vallejo  Cont Losartan  Initiate Lipitor  Check lipid Profile, A1c  Cont Brovana, Budesonide via neb, Albuterol MDI  DVT prophylaxis    CAPRINI SCORE [CLOT]  [x] Age: 61-74 years                                           (2 Points)                 [x ] BMI > 25 Kg/m2                                            (1 Point)                                                                                                                                                         Total Score [  3 ]  Caprini Score 0 - 2:  Low Risk, No VTE Prophylaxis required for most patients, encourage ambulation  Caprini Score 3 - 6:  At Risk, pharmacologic VTE prophylaxis is indicated for most patients (in the absence of a contraindication)  Caprini Score Greater than or = 7:  High Risk, pharmacologic VTE prophylaxis is indicated for most patients (in the absence of a contraindication) 74 yr old female HTN, HLD  borderline DM, presents to the ED with c/o intermittent dizziness for a week, worsened today.  Vertigo in pt with risk factors for vertebrobasilar insuff  ?BPV  Chronic bronchitis, stable    Admit to tele  Will get CT Angio head and neck r/o narrowing, thrombosis  Will initiate ASA 81 mg/day, Meclizine prn  Echo to assess LV fn  Neuro eval- Dr Vallejo  Cont Losartan  Initiate Lipitor  Check lipid Profile, A1c  Cont Brovana, Budesonide via neb, Albuterol MDI  PT eval  DVT prophylaxis    CAPRINI SCORE [CLOT]  [x] Age: 61-74 years                                           (2 Points)                 [x ] BMI > 25 Kg/m2                                            (1 Point)                                                                                                                                                         Total Score [  3 ]  Caprini Score 0 - 2:  Low Risk, No VTE Prophylaxis required for most patients, encourage ambulation  Caprini Score 3 - 6:  At Risk, pharmacologic VTE prophylaxis is indicated for most patients (in the absence of a contraindication)  Caprini Score Greater than or = 7:  High Risk, pharmacologic VTE prophylaxis is indicated for most patients (in the absence of a contraindication)

## 2019-12-18 NOTE — REVIEW OF SYSTEMS
[Dizziness] : dizziness [Memory Loss] : memory loss [Negative] : Heme/Lymph [Headache] : no headache [Confusion] : no confusion

## 2019-12-18 NOTE — H&P ADULT - NSHPPHYSICALEXAM_GEN_ALL_CORE
Vital Signs (24 Hrs):  T(C): 37 (12-18-19 @ 16:36), Max: 37 (12-18-19 @ 16:36)  HR: 73 (12-18-19 @ 16:36) (73 - 73)  BP: 146/84 (12-18-19 @ 16:36) (146/84 - 146/84)  RR: 17 (12-18-19 @ 16:36) (17 - 17)  SpO2: 99% (12-18-19 @ 16:36) (99% - 99%)  Daily Height in cm: 167.64 (18 Dec 2019 16:36)

## 2019-12-19 ENCOUNTER — TRANSCRIPTION ENCOUNTER (OUTPATIENT)
Age: 74
End: 2019-12-19

## 2019-12-19 ENCOUNTER — APPOINTMENT (OUTPATIENT)
Dept: FAMILY MEDICINE | Facility: CLINIC | Age: 74
End: 2019-12-19

## 2019-12-19 VITALS — OXYGEN SATURATION: 98 %

## 2019-12-19 LAB
CHOLEST SERPL-MCNC: 221 MG/DL — HIGH (ref 10–199)
HBA1C BLD-MCNC: 6.6 % — HIGH (ref 4–5.6)
HCV AB S/CO SERPL IA: 0.09 S/CO — SIGNIFICANT CHANGE UP (ref 0–0.99)
HCV AB SERPL-IMP: SIGNIFICANT CHANGE UP
HDLC SERPL-MCNC: 51 MG/DL — SIGNIFICANT CHANGE UP
LIPID PNL WITH DIRECT LDL SERPL: 149 MG/DL — HIGH
TOTAL CHOLESTEROL/HDL RATIO MEASUREMENT: 4.3 RATIO — SIGNIFICANT CHANGE UP (ref 3.3–7.1)
TRIGL SERPL-MCNC: 107 MG/DL — SIGNIFICANT CHANGE UP (ref 10–149)
TROPONIN I SERPL-MCNC: <.017 NG/ML — LOW (ref 0.02–0.06)

## 2019-12-19 PROCEDURE — 86803 HEPATITIS C AB TEST: CPT

## 2019-12-19 PROCEDURE — 94640 AIRWAY INHALATION TREATMENT: CPT

## 2019-12-19 PROCEDURE — 93306 TTE W/DOPPLER COMPLETE: CPT

## 2019-12-19 PROCEDURE — 36415 COLL VENOUS BLD VENIPUNCTURE: CPT

## 2019-12-19 PROCEDURE — 93306 TTE W/DOPPLER COMPLETE: CPT | Mod: 26

## 2019-12-19 PROCEDURE — 84484 ASSAY OF TROPONIN QUANT: CPT

## 2019-12-19 PROCEDURE — 99239 HOSP IP/OBS DSCHRG MGMT >30: CPT

## 2019-12-19 PROCEDURE — 70498 CT ANGIOGRAPHY NECK: CPT

## 2019-12-19 PROCEDURE — 83036 HEMOGLOBIN GLYCOSYLATED A1C: CPT

## 2019-12-19 PROCEDURE — 85027 COMPLETE CBC AUTOMATED: CPT

## 2019-12-19 PROCEDURE — 93005 ELECTROCARDIOGRAM TRACING: CPT

## 2019-12-19 PROCEDURE — 96360 HYDRATION IV INFUSION INIT: CPT

## 2019-12-19 PROCEDURE — 70496 CT ANGIOGRAPHY HEAD: CPT

## 2019-12-19 PROCEDURE — 99285 EMERGENCY DEPT VISIT HI MDM: CPT | Mod: 25

## 2019-12-19 PROCEDURE — 80061 LIPID PANEL: CPT

## 2019-12-19 PROCEDURE — 70450 CT HEAD/BRAIN W/O DYE: CPT

## 2019-12-19 PROCEDURE — 80053 COMPREHEN METABOLIC PANEL: CPT

## 2019-12-19 RX ORDER — MECLIZINE HCL 12.5 MG
1 TABLET ORAL
Qty: 120 | Refills: 0
Start: 2019-12-19 | End: 2020-01-17

## 2019-12-19 RX ORDER — ASPIRIN/CALCIUM CARB/MAGNESIUM 324 MG
1 TABLET ORAL
Qty: 0 | Refills: 0 | DISCHARGE
Start: 2019-12-19

## 2019-12-19 RX ADMIN — Medication 81 MILLIGRAM(S): at 11:19

## 2019-12-19 RX ADMIN — ENOXAPARIN SODIUM 40 MILLIGRAM(S): 100 INJECTION SUBCUTANEOUS at 11:18

## 2019-12-19 RX ADMIN — LOSARTAN POTASSIUM 50 MILLIGRAM(S): 100 TABLET, FILM COATED ORAL at 05:55

## 2019-12-19 RX ADMIN — Medication 0.5 MILLIGRAM(S): at 08:36

## 2019-12-19 RX ADMIN — ARFORMOTEROL TARTRATE 15 MICROGRAM(S): 15 SOLUTION RESPIRATORY (INHALATION) at 08:36

## 2019-12-19 RX ADMIN — Medication 12.5 MILLIGRAM(S): at 12:04

## 2019-12-19 NOTE — PROGRESS NOTE ADULT - SUBJECTIVE AND OBJECTIVE BOX
Patient is a 74y old  Female who presents with a chief complaint of dizziness for a week (19 Dec 2019 07:22)      Patient seen and examined at bedside, no events overnight, in no acute distress.     ALLERGIES:  Bactrim (Short breath)  penicillins (Rash)  Zyflo (Stomach Upset; Vomiting)    MEDICATIONS:  acetaminophen   Tablet .. 650 milliGRAM(s) Oral every 6 hours PRN  ALBUTerol    90 MICROgram(s) HFA Inhaler 2 Puff(s) Inhalation every 4 hours PRN  arformoterol for Nebulization 15 MICROGram(s) Nebulizer every 12 hours  aspirin enteric coated 81 milliGRAM(s) Oral daily  atorvastatin 10 milliGRAM(s) Oral at bedtime  buDESOnide    Inhalation Suspension 0.5 milliGRAM(s) Inhalation every 12 hours  enoxaparin Injectable 40 milliGRAM(s) SubCutaneous daily  guaifenesin/dextromethorphan  Syrup 10 milliLiter(s) Oral four times a day PRN  losartan 50 milliGRAM(s) Oral daily  meclizine 12.5 milliGRAM(s) Oral four times a day PRN    Vital Signs Last 24 Hrs  T(C): 36.5 (19 Dec 2019 10:11), Max: 37 (18 Dec 2019 16:36)  T(F): 97.7 (19 Dec 2019 10:11), Max: 98.6 (18 Dec 2019 16:36)  HR: 69 (19 Dec 2019 10:19) (64 - 74)  BP: 153/70 (19 Dec 2019 10:11) (104/62 - 159/85)  BP(mean): 104 (18 Dec 2019 20:30) (83 - 104)  RR: 16 (19 Dec 2019 10:11) (16 - 18)  SpO2: 98% (19 Dec 2019 10:19) (98% - 100%)  I&O's Summary        PAST MEDICAL & SURGICAL HISTORY:  Diverticulitis of colon (without mention of hemorrhage)  Pneumonia  2011  Antibody Deficiency Syndrome  Hypercholesterolemia  History of Hypertension  Asthma  /Chronic brochitiss  colostomy 1/2011: reversal 8/11  History of Dilatation and Curettage      Home Medications:  Brovana 15 mcg/2 mL inhalation solution: 2 milliliter(s) inhaled 2 times a day (18 Dec 2019 17:10)  budesonide 0.5 mg/2 mL inhalation suspension:  inhaled twice daily (18 Dec 2019 17:10)  losartan 50 mg oral tablet: 1 tab(s) orally once a day (18 Dec 2019 17:10)      PHYSICAL EXAM:  General: NAD, A/O x3  ENT: MMM, no thrush  Neck: Supple, No JVD  Lungs: Clear to percussion bilaterally, non labored breathing  Cardio: RRR, S1/S2, No murmurs  Abdomen: Soft, Nontender, Nondistended; Bowel sounds present  Neuro: no focal deficits   Extremities: No clubbing, cyanosis, or edema      LABS:                        14.8   7.43  )-----------( 276      ( 18 Dec 2019 17:30 )             44.4     12-18    145  |  108  |  16  ----------------------------<  166  3.9   |  26  |  0.98    Ca    8.9      18 Dec 2019 17:30    TPro  6.5  /  Alb  3.3  /  TBili  1.5  /  DBili  x   /  AST  16  /  ALT  17  /  AlkPhos  83  12-18    12-19 Chol 221 mg/dL  mg/dL HDL 51 mg/dL Trig 107 mg/dL    12-19 [id number] 6.6    RADIOLOGY & ADDITIONAL TESTS: < from: CT Angio Neck w/ IV Cont (12.18.19 @ 21:30) >  IMPRESSION    Tortuosity of the head and neck vasculature, but without significant stenosis. Widely patent head and neck vessels. Advanced degenerative changes of the lower cervical spine with stenosis at C5-6 and C6-7.    < end of copied text >        Care Discussed with Consultants/Other Providers: Hospitalist

## 2019-12-19 NOTE — PROGRESS NOTE ADULT - ATTENDING COMMENTS
I have personally seen and examined patient on the above date.  I discussed the case with LONNY Avery and I agree with findings and plan as detailed per note above, which I have amended where appropriate.      Possible vertigo - improved with Meclizine, outpatient MRI if persists, d/w neuro Dr. Vallejo  Orthostatics negative (done by me personally)    Stable for d/c. Time spent on d/c 31 min. Daughter at bedside - all questions answered.

## 2019-12-19 NOTE — DISCHARGE NOTE NURSING/CASE MANAGEMENT/SOCIAL WORK - PATIENT PORTAL LINK FT
You can access the FollowMyHealth Patient Portal offered by [facility] by registering at the following website: [email or web address]. By joining [facility]’s FollowMyHealth portal, you will also be able to view your health information using other applications (apps) compatible with our system.

## 2019-12-19 NOTE — PROGRESS NOTE ADULT - ASSESSMENT
74 yr old female HTN, HLD  borderline DM, p/w dizziness.    # Dizziness - unlikely vertigo  Neuro consult appreciated: meclizine trial, MRI brain to be considered, could do as outpatient,   neuro f/u as outpatient.    # HTN - controlled on current meds    # HLD  stable, will continue statin     # Borderline T2DM   - manage with diet and exercise    # Dispo: d/c home

## 2019-12-19 NOTE — DISCHARGE NOTE PROVIDER - CARE PROVIDERS DIRECT ADDRESSES
[email or web address]

## 2019-12-19 NOTE — DISCHARGE NOTE PROVIDER - NSDCMRMEDTOKEN_GEN_ALL_CORE_FT
aspirin 81 mg oral delayed release tablet: 1 tab(s) orally once a day  Brovana 15 mcg/2 mL inhalation solution: 2 milliliter(s) inhaled 2 times a day  budesonide 0.5 mg/2 mL inhalation suspension:  inhaled twice daily  guaifenesin-dextromethorphan 100 mg-10 mg/5 mL oral liquid: 10 milliliter(s) orally 4 times a day  losartan 50 mg oral tablet: 1 tab(s) orally once a day  meclizine 12.5 mg oral tablet: 1 tab(s) orally 4 times a day, As needed, Dizziness

## 2019-12-19 NOTE — DISCHARGE NOTE PROVIDER - HOSPITAL COURSE
74 yr old female HTN, HLD  borderline DM, p/w dizziness, seen by neuro - unlikely vertigo.    Neurology recommends meclizine trial and outpatient MRI with neuro follow up.    Pt. is stable to be discharged on po meclizine prn, will follow up with PMD - Dr. Morel    and neurology.

## 2019-12-19 NOTE — CONSULT NOTE ADULT - SUBJECTIVE AND OBJECTIVE BOX
Neurology consult    VERA LOMBARDO74yFemale     Patient is a 74y old  Female who presents with a chief complaint of dizziness for a week (18 Dec 2019 19:42)      HPI:  74 yr old female HTN, HLD  borderline DM, presents to the ED with c/o intermittent dizziness for a week. Pt states seems to have gotten worse today because when she got out of bed, she almost lost balance and had to grab and lean on the wall to move and walk.  She denies headache, nausea, vomiting, blurred vision, tinnitus, numbness, syncope, chest pain, dyspnea.  Pt states she has a chronic mild cough, and uses nebs regularly.    Was advised by PMD to go to the ED for eval. (18 Dec 2019 19:42)    She doesn't specifically report vertigo no headache tinnitus hearing loss visual motor or sensory complaints.      REVIEW OF SYSTEMS:    Constitutional: No fever, chills, fatigue, weakness  Eyes: no eye pain, visual disturbances, or discharge  ENT:  No difficulty hearing, tinnitus, vertigo; No sinus or throat pain  Neck: No pain or stiffness  Respiratory: No cough, dyspnea, wheezing   Cardiovascular: No chest pain, palpitations,   Gastrointestinal: No abdominal or epigastric pain. No nausea, vomiting  No diarrhea or constipation.   Genitourinary: No dysuria, frequency, hematuria or incontinence  Neurological: Positive dizziness  Psychiatric: No depression, anxiety, mood swings or difficulty sleeping  Musculoskeletal: No joint pain or swelling; No muscle, back or extremity pain  Skin: No itching, burning, rashes or lesions   Lymph Nodes: No enlarged glands  Endocrine: No heat or cold intolerance; No hair loss, No h/o diabetes or thyroid dysfunction  Allergy and Immunologic: No hives or eczema    MEDICATIONS    acetaminophen   Tablet .. 650 milliGRAM(s) Oral every 6 hours PRN  ALBUTerol    90 MICROgram(s) HFA Inhaler 2 Puff(s) Inhalation every 4 hours PRN  arformoterol for Nebulization 15 MICROGram(s) Nebulizer every 12 hours  aspirin enteric coated 81 milliGRAM(s) Oral daily  atorvastatin 10 milliGRAM(s) Oral at bedtime  buDESOnide    Inhalation Suspension 0.5 milliGRAM(s) Inhalation every 12 hours  enoxaparin Injectable 40 milliGRAM(s) SubCutaneous daily  guaifenesin/dextromethorphan  Syrup 10 milliLiter(s) Oral four times a day PRN  losartan 50 milliGRAM(s) Oral daily  meclizine 12.5 milliGRAM(s) Oral four times a day PRN      PMH: Diverticulitis of colon (without mention of hemorrhage)  Pneumonia  2011  Antibody Deficiency Syndrome  Hypercholesterolemia  History of Hypertension  Asthma  /Chronic brochitiss       PSH: colostomy 1/2011  History of Dilatation and Curettage      Family history:   FAMILY HISTORY:  No pertinent family history in first degree relatives      SOCIAL HISTORY:  No history of tobacco or alcohol use     Allergies    Bactrim (Short breath)  penicillins (Rash)  Zyflo (Stomach Upset; Vomiting)    Intolerances        Height (cm): 167.6 (12-18 @ 22:27)  Weight (kg): 83.5 (12-18 @ 16:36)  BMI (kg/m2): 29.7 (12-18 @ 22:27)    Vital Signs Last 24 Hrs  T(C): 37 (19 Dec 2019 05:55), Max: 37 (18 Dec 2019 16:36)  T(F): 98.6 (19 Dec 2019 05:55), Max: 98.6 (18 Dec 2019 16:36)  HR: 66 (19 Dec 2019 05:55) (64 - 74)  BP: 155/86 (19 Dec 2019 05:55) (104/62 - 159/85)  BP(mean): 104 (18 Dec 2019 20:30) (83 - 104)  RR: 16 (19 Dec 2019 05:55) (16 - 18)  SpO2: 99% (19 Dec 2019 05:55) (98% - 100%)      On Neurological Examination:    Head: normocephalic Neck: supple no carotid bruits    Mental Status - Patient is alert, oriented speech intact    Cranial Nerves - PERRL, EOMI, VFF, normal V through XII no nystagmus    Motor Exam :  No drift normal strength tone and coordination no abnormal movements no focality    Sensory    Intact to light touch and pinprick bilaterally normal DSS to touch    Reflexes:  symmetric @ 2+ plantars downgoing    Gait -  normal                                                          LABS:  CBC Full  -  ( 18 Dec 2019 17:30 )  WBC Count : 7.43 K/uL  RBC Count : 5.20 M/uL  Hemoglobin : 14.8 g/dL  Hematocrit : 44.4 %  Platelet Count - Automated : 276 K/uL  Mean Cell Volume : 85.4 fl  Mean Cell Hemoglobin : 28.5 pg  Mean Cell Hemoglobin Concentration : 33.3 gm/dL  Auto Neutrophil # : 5.14 K/uL  Auto Lymphocyte # : 1.60 K/uL  Auto Monocyte # : 0.50 K/uL  Auto Eosinophil # : 0.10 K/uL  Auto Basophil # : 0.06 K/uL  Auto Neutrophil % : 69.3 %  Auto Lymphocyte % : 21.5 %  Auto Monocyte % : 6.7 %  Auto Eosinophil % : 1.3 %  Auto Basophil % : 0.8 %      12-18    145  |  108  |  16  ----------------------------<  166<H>  3.9   |  26  |  0.98    Ca    8.9      18 Dec 2019 17:30    TPro  6.5  /  Alb  3.3  /  TBili  1.5<H>  /  DBili  x   /  AST  16  /  ALT  17  /  AlkPhos  83  12-18    LIVER FUNCTIONS - ( 18 Dec 2019 17:30 )  Alb: 3.3 g/dL / Pro: 6.5 g/dL / ALK PHOS: 83 U/L / ALT: 17 U/L / AST: 16 U/L / GGT: x           Hemoglobin A1C:                 RADIOLOGY  CT:   < from: CT Head No Cont (12.18.19 @ 17:57) >    EXAM:  CT BRAIN      PROCEDURE DATE:  12/18/2019        INTERPRETATION:  INDICATION:  Dizziness  TECHNIQUE:  A non contrast 2.5mm axial CT study of the brain was performed from skull base to vertex. Coronal and sagittal reformations were generated from the axial data.  COMPARISON EXAMINATION:  CT 8/5/2012    FINDINGS:      HEMISPHERES:  Involutional changes and volume loss are noted, commensurate with age. No acute normality suggested.  VENTRICLES:  Midline and normal in size.  POSTERIOR FOSSA: The brain stem and cerebellum are unremarkable.  No CP angle lesion noted.  EXTRACEREBRAL SPACES:  No subdural or epidural collections are noted.  SKULL BASE AND CALVARIUM:  Appears intact.  No fracture or destructive lesion is identified.  SINUSES AND MASTOIDS:  Clear.  MISCELLANEOUS:  No orbital or suprasellar abnormality noted.      IMPRESSION:    1)  involutional changes and volume loss, commensurate with age. No acute abnormality suggested.  2)  clear sinuses and mastoids..                < end of copied text >    CT angiogram head and neck preliminary reports no significant stenosis seen.

## 2019-12-19 NOTE — DISCHARGE NOTE PROVIDER - NSDCCPCAREPLAN_GEN_ALL_CORE_FT
PRINCIPAL DISCHARGE DIAGNOSIS  Diagnosis: Dizziness  Assessment and Plan of Treatment:       SECONDARY DISCHARGE DIAGNOSES  Diagnosis: Gait abnormality  Assessment and Plan of Treatment:

## 2019-12-19 NOTE — CONSULT NOTE ADULT - ASSESSMENT
73 yo female with dizziness for 1 week and neuro exam CT head and preliminary report of CT angiogram of head and neck all negative.  She doesn't specifically report vertigo. Doubt TIA or CVA.  R/O a peripheral vestibular disorder but exam is not diagnostic.    REC:  meclizine trial, MRI brain to be considered, could do as outpatient, neuro f/u as outpatient.

## 2019-12-19 NOTE — DISCHARGE NOTE PROVIDER - CARE PROVIDER_API CALL
Harmeet Diop)  Medicine  [address], [address]  [place] [address]  Phone: [phone]  Fax: [phone]  Follow Up Time:     Temo Vallejo)  Neurology  [address] [facility] [facility] [facility], [address]  [place], [place] [id number]  Phone: [phone]  Fax: [phone]  Follow Up Time:

## 2019-12-22 ENCOUNTER — FORM ENCOUNTER (OUTPATIENT)
Age: 74
End: 2019-12-22

## 2019-12-23 ENCOUNTER — OUTPATIENT (OUTPATIENT)
Dept: OUTPATIENT SERVICES | Facility: HOSPITAL | Age: 74
LOS: 1 days | End: 2019-12-23
Payer: MEDICARE

## 2019-12-23 ENCOUNTER — APPOINTMENT (OUTPATIENT)
Dept: MRI IMAGING | Facility: HOSPITAL | Age: 74
End: 2019-12-23
Payer: MEDICARE

## 2019-12-23 DIAGNOSIS — R42 DIZZINESS AND GIDDINESS: ICD-10-CM

## 2019-12-23 DIAGNOSIS — R41.3 OTHER AMNESIA: ICD-10-CM

## 2019-12-23 PROCEDURE — 70551 MRI BRAIN STEM W/O DYE: CPT | Mod: 26

## 2019-12-23 PROCEDURE — 70551 MRI BRAIN STEM W/O DYE: CPT

## 2019-12-27 ENCOUNTER — APPOINTMENT (OUTPATIENT)
Dept: FAMILY MEDICINE | Facility: CLINIC | Age: 74
End: 2019-12-27
Payer: MEDICARE

## 2019-12-27 VITALS
DIASTOLIC BLOOD PRESSURE: 80 MMHG | WEIGHT: 182 LBS | OXYGEN SATURATION: 98 % | SYSTOLIC BLOOD PRESSURE: 120 MMHG | RESPIRATION RATE: 14 BRPM | BODY MASS INDEX: 30.32 KG/M2 | HEIGHT: 65 IN | HEART RATE: 70 BPM

## 2019-12-27 DIAGNOSIS — H81.10 BENIGN PAROXYSMAL VERTIGO, UNSPECIFIED EAR: ICD-10-CM

## 2019-12-27 DIAGNOSIS — R42 DIZZINESS AND GIDDINESS: ICD-10-CM

## 2019-12-27 DIAGNOSIS — R09.81 NASAL CONGESTION: ICD-10-CM

## 2019-12-27 PROCEDURE — 99496 TRANSJ CARE MGMT HIGH F2F 7D: CPT

## 2019-12-27 NOTE — COUNSELING
[Fall prevention counseling provided] : Fall prevention counseling provided [Adequate lighting] : Adequate lighting [No throw rugs] : No throw rugs [Use proper foot wear] : Use proper foot wear [Engage in a relaxing activity] : Engage in a relaxing activity [Behavioral health counseling provided] : Behavioral health counseling provided [Sleep ___ hours/day] : Sleep [unfilled] hours/day [Plan in advance] : Plan in advance [AUDIT-C Screening administered and reviewed] : AUDIT-C Screening administered and reviewed [Potential consequences of obesity discussed] : Potential consequences of obesity discussed [Benefits of weight loss discussed] : Benefits of weight loss discussed [Structured Weight Management Program suggested:] : Structured weight management program suggested [Encouraged to maintain food diary] : Encouraged to maintain food diary [Encouraged to increase physical activity] : Encouraged to increase physical activity [Encouraged to use exercise tracking device] : Encouraged to use exercise tracking device [Weigh Self Weekly] : weigh self weekly [____ min/wk Activity] : [unfilled] min/wk activity [Keep Food Diary] : keep food diary [Decrease Portions] : decrease portions [None] : None [Good understanding] : Patient has a good understanding of lifestyle changes and steps needed to achieve self management goal

## 2019-12-27 NOTE — PHYSICAL EXAM
[No Acute Distress] : no acute distress [Well Nourished] : well nourished [Well Developed] : well developed [Normal Voice/Communication] : normal voice/communication [Well-Appearing] : well-appearing [Normal Sclera/Conjunctiva] : normal sclera/conjunctiva [PERRL] : pupils equal round and reactive to light [Normal Outer Ear/Nose] : the outer ears and nose were normal in appearance [EOMI] : extraocular movements intact [Normal TMs] : both tympanic membranes were normal [Normal Oropharynx] : the oropharynx was normal [Normal Nasal Mucosa] : the nasal mucosa was normal [No Lymphadenopathy] : no lymphadenopathy [No JVD] : no jugular venous distention [Supple] : supple [Thyroid Normal, No Nodules] : the thyroid was normal and there were no nodules present [No Accessory Muscle Use] : no accessory muscle use [No Respiratory Distress] : no respiratory distress  [Clear to Auscultation] : lungs were clear to auscultation bilaterally [Normal Rate] : normal rate  [Normal S1, S2] : normal S1 and S2 [Regular Rhythm] : with a regular rhythm [No Murmur] : no murmur heard [No Carotid Bruits] : no carotid bruits [No Abdominal Bruit] : a ~M bruit was not heard ~T in the abdomen [No Varicosities] : no varicosities [Pedal Pulses Present] : the pedal pulses are present [No Edema] : there was no peripheral edema [No Palpable Aorta] : no palpable aorta [No Extremity Clubbing/Cyanosis] : no extremity clubbing/cyanosis [Soft] : abdomen soft [Non Tender] : non-tender [Non-distended] : non-distended [No Masses] : no abdominal mass palpated [No HSM] : no HSM [Normal Bowel Sounds] : normal bowel sounds [No Hernias] : no hernias [Normal Supraclavicular Nodes] : no supraclavicular lymphadenopathy [Normal Posterior Cervical Nodes] : no posterior cervical lymphadenopathy [Normal Anterior Cervical Nodes] : no anterior cervical lymphadenopathy [No CVA Tenderness] : no CVA  tenderness [No Spinal Tenderness] : no spinal tenderness [No Joint Swelling] : no joint swelling [Grossly Normal Strength/Tone] : grossly normal strength/tone [No Rash] : no rash [No Skin Lesions] : no skin lesions [Coordination Grossly Intact] : coordination grossly intact [Normal Gait] : normal gait [No Focal Deficits] : no focal deficits [Speech Grossly Normal] : speech grossly normal [Deep Tendon Reflexes (DTR)] : deep tendon reflexes were 2+ and symmetric [Normal Affect] : the affect was normal [Alert and Oriented x3] : oriented to person, place, and time [Normal Insight/Judgement] : insight and judgment were intact [Normal Mood] : the mood was normal [FreeTextEntry1] : GI [de-identified] : gyn [de-identified] : forgetful [de-identified] : GYN [de-identified] : vertigo

## 2019-12-27 NOTE — HISTORY OF PRESENT ILLNESS
[Post-hospitalization from ___ Hospital] : Post-hospitalization from [unfilled] Hospital [Admitted on: ___] : The patient was admitted on [unfilled] [Discharged on ___] : discharged on [unfilled] [Discharge Summary] : discharge summary [Pertinent Labs] : pertinent labs [Radiology Findings] : radiology findings [Discharge Med List] : discharge medication list [Other: ____] : [unfilled] [Med Reconciliation] : medication reconciliation has been completed [Patient Contacted By: ____] : and contacted by [unfilled] [FreeTextEntry2] : This 74-year-old female, who presents today for hospital followup status post episode of what appears to be positional vertigo. Patient was admitted to [facility] on December 18, 2019. Patient was evaluated by neurology and placed on meclizine. Comprehensive blood work was obtained. Patient was deemed stable for discharge on December 19, 2019. As outpatient. Patient had MRI, which shows age-appropriate changes of brain and sinus thickening. Presently, patient is accompanied by her daughter. She is awake, alert, and oriented x3. She still complains of occasional dizziness and chief complaint is cognitive dysfunction that is forgetfulness.

## 2019-12-27 NOTE — ASSESSMENT
[FreeTextEntry1] : Assessment and plan:\par \par 1. Vertigo/dizziness meclizine as needed, reviewed MRI referral to neurology, referral to physical therapy for vestibular maneuvers.\par \par 2. Forgetfulness. Start donepezil 5 mg at bedtime and referral to neurology.\par \par 3. Sinus congestion, Flonase one spray per nostril twice a day.\par \par 4. Hypertension. Continue losartan 50 mg p.o. daily.\par \par 5. Hyperlipidemia. Continue low-fat, low-cholesterol diet and atorvastatin 20 mg p.o. at bedtime.\par \par 6. Vertigo/dizziness, questionable arrhythmia. Referral to cardiology.

## 2019-12-27 NOTE — REVIEW OF SYSTEMS
[Dizziness] : dizziness [Memory Loss] : memory loss [Negative] : Heme/Lymph [Headache] : no headache [Fainting] : no fainting [Confusion] : no confusion [Unsteady Walking] : no ataxia

## 2019-12-27 NOTE — HEALTH RISK ASSESSMENT
[No] : In the past 12 months have you used drugs other than those required for medical reasons? No [1] : 2) Feeling down, depressed, or hopeless for several days (1) [Fully functional (bathing, dressing, toileting, transferring, walking, feeding)] : Fully functional (bathing, dressing, toileting, transferring, walking, feeding) [Fully functional (using the telephone, shopping, preparing meals, housekeeping, doing laundry, using] : Fully functional and needs no help or supervision to perform IADLs (using the telephone, shopping, preparing meals, housekeeping, doing laundry, using transportation, managing medications and managing finances) [] : No [Audit-CScore] : 0 [[id number]] : 1

## 2020-01-02 ENCOUNTER — MEDICATION RENEWAL (OUTPATIENT)
Age: 75
End: 2020-01-02

## 2020-01-06 ENCOUNTER — APPOINTMENT (OUTPATIENT)
Dept: FAMILY MEDICINE | Facility: CLINIC | Age: 75
End: 2020-01-06

## 2020-02-20 LAB
25(OH)D3 SERPL-MCNC: 22.9 NG/ML
ALBUMIN SERPL ELPH-MCNC: 4.3 G/DL
ALP BLD-CCNC: 84 U/L
ALT SERPL-CCNC: 15 U/L
ANION GAP SERPL CALC-SCNC: 13 MMOL/L
APPEARANCE: CLEAR
AST SERPL-CCNC: 12 U/L
BACTERIA: NEGATIVE
BASOPHILS # BLD AUTO: 0.08 K/UL
BASOPHILS NFR BLD AUTO: 1.3 %
BILIRUB SERPL-MCNC: 2.1 MG/DL
BILIRUBIN URINE: NEGATIVE
BLOOD URINE: NEGATIVE
BUN SERPL-MCNC: 16 MG/DL
CALCIUM SERPL-MCNC: 9.4 MG/DL
CHLORIDE SERPL-SCNC: 105 MMOL/L
CHOLEST SERPL-MCNC: 254 MG/DL
CHOLEST/HDLC SERPL: 4.3 RATIO
CO2 SERPL-SCNC: 27 MMOL/L
COLOR: NORMAL
CREAT SERPL-MCNC: 0.95 MG/DL
EOSINOPHIL # BLD AUTO: 0.08 K/UL
EOSINOPHIL NFR BLD AUTO: 1.3 %
ESTIMATED AVERAGE GLUCOSE: 143 MG/DL
FERRITIN SERPL-MCNC: 161 NG/ML
FOLATE SERPL-MCNC: 12 NG/ML
GLUCOSE QUALITATIVE U: NEGATIVE
GLUCOSE SERPL-MCNC: 129 MG/DL
HBA1C MFR BLD HPLC: 6.6 %
HCT VFR BLD CALC: 45.6 %
HDLC SERPL-MCNC: 59 MG/DL
HGB BLD-MCNC: 14.9 G/DL
HYALINE CASTS: 1 /LPF
IMM GRANULOCYTES NFR BLD AUTO: 0.3 %
IRON SATN MFR SERPL: 37 %
IRON SERPL-MCNC: 94 UG/DL
KETONES URINE: NEGATIVE
LDLC SERPL CALC-MCNC: 176 MG/DL
LEUKOCYTE ESTERASE URINE: NEGATIVE
LYMPHOCYTES # BLD AUTO: 1.82 K/UL
LYMPHOCYTES NFR BLD AUTO: 28.8 %
MAGNESIUM SERPL-MCNC: 2.1 MG/DL
MAN DIFF?: NORMAL
MCHC RBC-ENTMCNC: 28.3 PG
MCHC RBC-ENTMCNC: 32.7 GM/DL
MCV RBC AUTO: 86.5 FL
MICROSCOPIC-UA: NORMAL
MONOCYTES # BLD AUTO: 0.54 K/UL
MONOCYTES NFR BLD AUTO: 8.5 %
NEUTROPHILS # BLD AUTO: 3.79 K/UL
NEUTROPHILS NFR BLD AUTO: 59.8 %
NITRITE URINE: NEGATIVE
PH URINE: 6
PLATELET # BLD AUTO: 308 K/UL
POTASSIUM SERPL-SCNC: 4.1 MMOL/L
PROT SERPL-MCNC: 6.3 G/DL
PROTEIN URINE: NEGATIVE
RBC # BLD: 5.27 M/UL
RBC # FLD: 13.9 %
RED BLOOD CELLS URINE: 2 /HPF
SODIUM SERPL-SCNC: 145 MMOL/L
SPECIFIC GRAVITY URINE: 1.01
SQUAMOUS EPITHELIAL CELLS: 0 /HPF
TIBC SERPL-MCNC: 257 UG/DL
TRIGL SERPL-MCNC: 95 MG/DL
TSH SERPL-ACNC: 1.75 UIU/ML
UIBC SERPL-MCNC: 163 UG/DL
URATE SERPL-MCNC: 5.6 MG/DL
UROBILINOGEN URINE: NORMAL
VIT B12 SERPL-MCNC: 470 PG/ML
WBC # FLD AUTO: 6.33 K/UL
WHITE BLOOD CELLS URINE: 1 /HPF

## 2020-08-20 ENCOUNTER — RESULT REVIEW (OUTPATIENT)
Age: 75
End: 2020-08-20

## 2020-10-19 ENCOUNTER — RX RENEWAL (OUTPATIENT)
Age: 75
End: 2020-10-19

## 2020-10-20 ENCOUNTER — APPOINTMENT (OUTPATIENT)
Dept: FAMILY MEDICINE | Facility: CLINIC | Age: 75
End: 2020-10-20
Payer: MEDICARE

## 2020-10-20 ENCOUNTER — NON-APPOINTMENT (OUTPATIENT)
Age: 75
End: 2020-10-20

## 2020-10-20 VITALS
OXYGEN SATURATION: 98 % | HEIGHT: 65 IN | WEIGHT: 182 LBS | DIASTOLIC BLOOD PRESSURE: 85 MMHG | HEART RATE: 78 BPM | BODY MASS INDEX: 30.32 KG/M2 | TEMPERATURE: 97.6 F | SYSTOLIC BLOOD PRESSURE: 127 MMHG | RESPIRATION RATE: 14 BRPM

## 2020-10-20 DIAGNOSIS — Z01.818 ENCOUNTER FOR OTHER PREPROCEDURAL EXAMINATION: ICD-10-CM

## 2020-10-20 DIAGNOSIS — N84.0 POLYP OF CORPUS UTERI: ICD-10-CM

## 2020-10-20 PROCEDURE — G0438: CPT

## 2020-10-20 PROCEDURE — 93000 ELECTROCARDIOGRAM COMPLETE: CPT

## 2020-10-20 PROCEDURE — 36415 COLL VENOUS BLD VENIPUNCTURE: CPT

## 2020-10-21 ENCOUNTER — OUTPATIENT (OUTPATIENT)
Dept: OUTPATIENT SERVICES | Facility: HOSPITAL | Age: 75
LOS: 1 days | End: 2020-10-21
Payer: MEDICARE

## 2020-10-21 VITALS
OXYGEN SATURATION: 97 % | SYSTOLIC BLOOD PRESSURE: 127 MMHG | HEART RATE: 96 BPM | HEIGHT: 64 IN | TEMPERATURE: 97 F | RESPIRATION RATE: 16 BRPM | WEIGHT: 179.9 LBS | DIASTOLIC BLOOD PRESSURE: 78 MMHG

## 2020-10-21 DIAGNOSIS — J44.9 CHRONIC OBSTRUCTIVE PULMONARY DISEASE, UNSPECIFIED: ICD-10-CM

## 2020-10-21 DIAGNOSIS — Z01.818 ENCOUNTER FOR OTHER PREPROCEDURAL EXAMINATION: ICD-10-CM

## 2020-10-21 DIAGNOSIS — Z91.89 OTHER SPECIFIED PERSONAL RISK FACTORS, NOT ELSEWHERE CLASSIFIED: ICD-10-CM

## 2020-10-21 DIAGNOSIS — Z90.49 ACQUIRED ABSENCE OF OTHER SPECIFIED PARTS OF DIGESTIVE TRACT: Chronic | ICD-10-CM

## 2020-10-21 DIAGNOSIS — N84.0 POLYP OF CORPUS UTERI: ICD-10-CM

## 2020-10-21 LAB
ALBUMIN SERPL ELPH-MCNC: 4.2 G/DL
ALP BLD-CCNC: 99 U/L
ALT SERPL-CCNC: 12 U/L
ANION GAP SERPL CALC-SCNC: 15 MMOL/L
APPEARANCE: CLEAR
APTT BLD: 32 SEC
AST SERPL-CCNC: 13 U/L
BACTERIA: NEGATIVE
BASOPHILS # BLD AUTO: 0.08 K/UL
BASOPHILS NFR BLD AUTO: 1.2 %
BILIRUB SERPL-MCNC: 1.5 MG/DL
BILIRUBIN URINE: NEGATIVE
BLOOD URINE: NEGATIVE
BUN SERPL-MCNC: 19 MG/DL
CALCIUM SERPL-MCNC: 9.5 MG/DL
CHLORIDE SERPL-SCNC: 103 MMOL/L
CHOLEST SERPL-MCNC: 255 MG/DL
CO2 SERPL-SCNC: 22 MMOL/L
COLOR: YELLOW
CREAT SERPL-MCNC: 0.84 MG/DL
EOSINOPHIL # BLD AUTO: 0.14 K/UL
EOSINOPHIL NFR BLD AUTO: 2 %
ESTIMATED AVERAGE GLUCOSE: 148 MG/DL
FOLATE SERPL-MCNC: 11.8 NG/ML
GLUCOSE QUALITATIVE U: NEGATIVE
GLUCOSE SERPL-MCNC: 149 MG/DL
HBA1C MFR BLD HPLC: 6.8 %
HCT VFR BLD CALC: 45 %
HDLC SERPL-MCNC: 71 MG/DL
HGB BLD-MCNC: 14.9 G/DL
HYALINE CASTS: 0 /LPF
IMM GRANULOCYTES NFR BLD AUTO: 0.4 %
INR PPP: 1.02 RATIO
KETONES URINE: NEGATIVE
LDLC SERPL CALC-MCNC: 166 MG/DL
LEUKOCYTE ESTERASE URINE: NEGATIVE
LYMPHOCYTES # BLD AUTO: 1.52 K/UL
LYMPHOCYTES NFR BLD AUTO: 22 %
MAN DIFF?: NORMAL
MCHC RBC-ENTMCNC: 28.4 PG
MCHC RBC-ENTMCNC: 33.1 GM/DL
MCV RBC AUTO: 85.9 FL
MICROSCOPIC-UA: NORMAL
MONOCYTES # BLD AUTO: 0.49 K/UL
MONOCYTES NFR BLD AUTO: 7.1 %
NEUTROPHILS # BLD AUTO: 4.65 K/UL
NEUTROPHILS NFR BLD AUTO: 67.3 %
NITRITE URINE: NEGATIVE
NONHDLC SERPL-MCNC: 185 MG/DL
PH URINE: 6
PLATELET # BLD AUTO: 297 K/UL
POTASSIUM SERPL-SCNC: 4.3 MMOL/L
PROT SERPL-MCNC: 6.1 G/DL
PROTEIN URINE: NEGATIVE
PT BLD: 12 SEC
RBC # BLD: 5.24 M/UL
RBC # FLD: 13.3 %
RED BLOOD CELLS URINE: 1 /HPF
SARS-COV-2 RNA SPEC QL NAA+PROBE: SIGNIFICANT CHANGE UP
SODIUM SERPL-SCNC: 141 MMOL/L
SPECIFIC GRAVITY URINE: 1.02
SQUAMOUS EPITHELIAL CELLS: 0 /HPF
T4 FREE SERPL-MCNC: 1.4 NG/DL
TRIGL SERPL-MCNC: 93 MG/DL
TSH SERPL-ACNC: 2.23 UIU/ML
URIC ACID CRYSTALS: ABNORMAL
UROBILINOGEN URINE: NORMAL
VIT B12 SERPL-MCNC: 427 PG/ML
WBC # FLD AUTO: 6.91 K/UL
WHITE BLOOD CELLS URINE: 1 /HPF

## 2020-10-21 PROCEDURE — G0463: CPT

## 2020-10-21 PROCEDURE — U0003: CPT

## 2020-10-21 RX ORDER — SODIUM CHLORIDE 9 MG/ML
3 INJECTION INTRAMUSCULAR; INTRAVENOUS; SUBCUTANEOUS EVERY 8 HOURS
Refills: 0 | Status: DISCONTINUED | OUTPATIENT
Start: 2020-10-23 | End: 2020-11-06

## 2020-10-21 RX ORDER — LIDOCAINE HCL 20 MG/ML
0.2 VIAL (ML) INJECTION ONCE
Refills: 0 | Status: DISCONTINUED | OUTPATIENT
Start: 2020-10-23 | End: 2020-11-06

## 2020-10-21 NOTE — PHYSICAL EXAM
[No Acute Distress] : no acute distress [Well Nourished] : well nourished [Well Developed] : well developed [Well-Appearing] : well-appearing [Normal Voice/Communication] : normal voice/communication [Normal Sclera/Conjunctiva] : normal sclera/conjunctiva [PERRL] : pupils equal round and reactive to light [EOMI] : extraocular movements intact [Normal Outer Ear/Nose] : the outer ears and nose were normal in appearance [Normal Oropharynx] : the oropharynx was normal [Normal TMs] : both tympanic membranes were normal [Normal Nasal Mucosa] : the nasal mucosa was normal [No Lymphadenopathy] : no lymphadenopathy [No JVD] : no jugular venous distention [Supple] : supple [Thyroid Normal, No Nodules] : the thyroid was normal and there were no nodules present [No Respiratory Distress] : no respiratory distress  [Clear to Auscultation] : lungs were clear to auscultation bilaterally [No Accessory Muscle Use] : no accessory muscle use [Regular Rhythm] : with a regular rhythm [Normal Rate] : normal rate  [Normal S1, S2] : normal S1 and S2 [No Murmur] : no murmur heard [No Carotid Bruits] : no carotid bruits [No Abdominal Bruit] : a ~M bruit was not heard ~T in the abdomen [No Varicosities] : no varicosities [Pedal Pulses Present] : the pedal pulses are present [No Edema] : there was no peripheral edema [No Palpable Aorta] : no palpable aorta [No Extremity Clubbing/Cyanosis] : no extremity clubbing/cyanosis [Soft] : abdomen soft [Non Tender] : non-tender [Non-distended] : non-distended [No Masses] : no abdominal mass palpated [No HSM] : no HSM [Normal Bowel Sounds] : normal bowel sounds [No Hernias] : no hernias [Normal Supraclavicular Nodes] : no supraclavicular lymphadenopathy [Normal Posterior Cervical Nodes] : no posterior cervical lymphadenopathy [Normal Anterior Cervical Nodes] : no anterior cervical lymphadenopathy [No CVA Tenderness] : no CVA  tenderness [No Joint Swelling] : no joint swelling [No Spinal Tenderness] : no spinal tenderness [Grossly Normal Strength/Tone] : grossly normal strength/tone [No Rash] : no rash [No Skin Lesions] : no skin lesions [Coordination Grossly Intact] : coordination grossly intact [No Focal Deficits] : no focal deficits [Normal Gait] : normal gait [Deep Tendon Reflexes (DTR)] : deep tendon reflexes were 2+ and symmetric [Speech Grossly Normal] : speech grossly normal [Normal Affect] : the affect was normal [Alert and Oriented x3] : oriented to person, place, and time [Normal Mood] : the mood was normal [Normal Insight/Judgement] : insight and judgment were intact [de-identified] : gyn [FreeTextEntry1] : GI [de-identified] : GYN [de-identified] : vertigo [de-identified] : forgetful

## 2020-10-21 NOTE — HISTORY OF PRESENT ILLNESS
[Family Member] : family member [FreeTextEntry1] : Please see HPI [de-identified] : Patient is a 75-year-old female who presents today for comprehensive health maintenance physical exam patient recently was diagnosed with a uterine polyp she will have polypectomy done Friday, October 23.  Patient recently seen by cardiology and also pulmonology patient was given a clean bill of health.  Patient follows up with neurology on a regular basis for cognitive dysfunction.\par \par We will be addressing hypertension, chronic obstructive pulmonary disease, elevated hemoglobin A1c, cognitive dysfunction and hyperlipidemia

## 2020-10-21 NOTE — H&P PST ADULT - HISTORY OF PRESENT ILLNESS
76 yo female with h/o HTN, HLD, Type 2 DM (no current medications - 10/2020 A1C=6.9), COPD and asthma (well-controlled on medications - no recent exacerbations) and uterine polyp discovered on routine sono. Pt is scheduled for D&C, Operative Hysteroscopy and Removal of Endometrial Polyp on 10/23/2020.    Covid-19 PCR done 10/21/2020 at PST.

## 2020-10-21 NOTE — H&P PST ADULT - NSICDXPROBLEM_GEN_ALL_CORE_FT
PROBLEM DIAGNOSES  Problem: Uterine polyp  Assessment and Plan: D&C  Operative Hysteroscopy  Removal of Endometrial Polyp  Covid-19 PCR sent 10/21/2020 from [facility]    Problem: COPD with asthma  Assessment and Plan: Pt instructed to take Brovana and Budesonide on am of surgery    Problem: At risk for sleep apnea  Assessment and Plan: ARMIDA Precautions  OR Booking notified

## 2020-10-21 NOTE — HEALTH RISK ASSESSMENT
[Very Good] : ~his/her~ current health as very good [Good] : ~his/her~  mood as  good [No falls in past year] : Patient reported no falls in the past year [No] : In the past 12 months have you used drugs other than those required for medical reasons? No [1] : 2) Feeling down, depressed, or hopeless for several days (1) [0] : 1) Little interest or pleasure doing things: Not at all (0) [Learning/Retaining New Information] : difficulty learning/retaining new information [None] : None [With Significant Other] : lives with significant other [Retired] : retired [Fully functional (bathing, dressing, toileting, transferring, walking, feeding)] : Fully functional (bathing, dressing, toileting, transferring, walking, feeding) [Fully functional (using the telephone, shopping, preparing meals, housekeeping, doing laundry, using] : Fully functional and needs no help or supervision to perform IADLs (using the telephone, shopping, preparing meals, housekeeping, doing laundry, using transportation, managing medications and managing finances) [Reports normal functional visual acuity (ie: able to read med bottle)] : Reports normal functional visual acuity [Smoke Detector] : smoke detector [Carbon Monoxide Detector] : carbon monoxide detector [Safety elements used in home] : safety elements used in home [Seat Belt] :  uses seat belt [Sunscreen] : uses sunscreen [Designated Healthcare Proxy] : Designated healthcare proxy [Name: ___] : Health Care Proxy's Name: [unfilled]  [Relationship: ___] : Relationship: [unfilled] [Aggressive treatment] : aggressive treatment [I will adhere to the patient's wishes as expressed in the advance directive except as noted below.] : I will adhere to the patient's wishes as expressed in the advance directive except as noted below [] : No [Audit-CScore] : 0 [[id number]] : 1 [Change in mental status noted] : No change in mental status noted [Language] : denies difficulty with language [Behavior] : denies difficulty with behavior [Handling Complex Tasks] : denies difficulty handling complex tasks [Reasoning] : denies difficulty with reasoning [Spatial Ability and Orientation] : denies difficulty with spatial ability and orientation [Reports changes in hearing] : Reports no changes in hearing [Reports changes in vision] : Reports no changes in vision [Reports changes in dental health] : Reports no changes in dental health [Travel to Developing Areas] : does not  travel to developing areas [TB Exposure] : is not being exposed to tuberculosis [Caregiver Concerns] : does not have caregiver concerns [AdvancecareDate] : 10/20

## 2020-10-21 NOTE — H&P PST ADULT - BREASTS COMMENTS
samll area of darkened skin near left nipple, left nipple inverted, no swelling/erythema/open skin or discharge noted

## 2020-10-21 NOTE — H&P PST ADULT - NSANTHOBSERVEDRD_ENT_A_CORE
All hourly rounds completed with bed in low/locked position. Patient has rested throughout shift without any acute changes. Biliary drain emptied once this shift, see charting. All needs met, with no questions or concerns at this time. I will update oncoming RN of patient status. No

## 2020-10-21 NOTE — H&P PST ADULT - NSICDXPASTMEDICALHX_GEN_ALL_CORE_FT
PAST MEDICAL HISTORY:  Antibody Deficiency Syndrome     Asthma well-controlled on medication, no recemnt hospitalizations    Diverticulitis of colon (without mention of hemorrhage)     History of Hypertension     Hypercholesterolemia     Pneumonia  2011     Vertigo hospitalized,12/2019; underwent cardiac & neuro eval     PAST MEDICAL HISTORY:  Antibody Deficiency Syndrome     Asthma well-controlled on medication, no recent hospitalizations    Cognitive dysfunction forgetfulness, takes donepezil    COPD with asthma     Diverticulitis of colon (without mention of hemorrhage) 2011    History of Hypertension     Hypercholesterolemia     Pneumonia  2011     Type 2 diabetes mellitus no current medications, Z1C 10/2020 = 6.9    Uterine polyp     Vertigo hospitalized,12/2019; underwent cardiac & neuro eval     PAST MEDICAL HISTORY:  Asthma well-controlled on medication, no recent hospitalizations    Cognitive dysfunction forgetfulness, takes donepezil    COPD with asthma     Diverticulitis of colon (without mention of hemorrhage) 2011    History of Hypertension     Hypercholesterolemia     Hypogammaglobulinemia no treatment currently, noted in chart 8/2012    Pneumonia  2011     Type 2 diabetes mellitus no current medications, Z1C 10/2020 = 6.9    Uterine polyp     Vertigo hospitalized,12/2019; underwent cardiac & neuro eval

## 2020-10-21 NOTE — H&P PST ADULT - SKIN/BREAST COMMENTS
rash on left breast s/p biopsy (- seeing Derm next week discoloration near nipple -  left breast s/p biopsy - benign, per pt/daughter

## 2020-10-21 NOTE — H&P PST ADULT - NSICDXPASTSURGICALHX_GEN_ALL_CORE_FT
PAST SURGICAL HISTORY:  colostomy 1/2011 reversal 8/11 - perforated colon, diverticulitis    History of cholecystectomy 2012 with hernia repair    History of Dilatation and Curettage

## 2020-10-21 NOTE — ASSESSMENT
[FreeTextEntry1] : Assessment and plan:\par \par 1.  Comprehensive health maintenance physical exam was unremarkable no acute findings.\par \par 2.  Comprehensive blood work drawn in office which will include CBC with differential, comprehensive metabolic, lipid profile, hemoglobin A1c, coags, thyroid function tests and urine analysis.\par \par 3.  Electrocardiogram done in office shows no acute ST-T wave changes stable when compared with previous\par \par 4.  Comprehensive pulmonology exam was done by pulmonology full pulmonary function test was done stable for patient.\par \par 5.  Comprehensive cardiology exam done by cardiology patient given clean bill of health.\par \par 6.  Patient is up-to-date with influenza vaccine recently administered by pulmonologist high-dose Fluzone.\par \par 7.  Patient is scheduled to have uterine polypectomy done October 23, 2020 this exam can serve as preop clearance for patient there is no medical contraindication for the proposed procedure pending only preop labs which were drawn in office and patient is scheduled for Covid PCR testing.

## 2020-10-21 NOTE — H&P PST ADULT - GASTROINTESTINAL DETAILS
ED Provider Note    CHIEF COMPLAINT  Chief Complaint   Patient presents with   • Diarrhea     started today with nausea this AM. Pt states that she just can't get warm and she feels like there are pins and neddels all over her body       HPI  Patient is otherwise healthy 28-year-old female who presents emergency room for repeat evaluation of occasional pins and needle sensations on her skin and scalp.  She also reports that earlier today she had 3 episodes of severe diarrhea that spontaneously resolved.  She denies any abdominal pain, nausea ports no chest pain and notes that all of her skin sensations are waxing and waning in intensity.  She has not taken anything for these sensations and reports that there is occasional itchiness associated with it.  She reports a low-grade frontal headache that has developed over the last hour without vision changes.  Patient googled her symptoms online and is concerned that she may have MS or dehydration.  The patient has not seen her primary care physician for any of these symptoms.  Review of the prior chart from yesterday notes she had a polyneuropathy and did not necessitate any laboratory workup and was discharged with strict return precautions.    REVIEW OF SYSTEMS  See HPI for further details. All other systems are negative.     PAST MEDICAL HISTORY       SOCIAL HISTORY  Social History     Social History Main Topics   • Smoking status: Never Smoker   • Smokeless tobacco: Never Used   • Alcohol use No   • Drug use: No   • Sexual activity: Not on file       SURGICAL HISTORY  patient denies any surgical history    CURRENT MEDICATIONS  Home Medications    **Home medications have not yet been reviewed for this encounter**         ALLERGIES  No Known Allergies    PHYSICAL EXAM  VITAL SIGNS: /92   Pulse 93   Temp 36.7 °C (98.1 °F) (Oral)   Resp 18   LMP 04/03/2019   SpO2 99%  @[email or web address][[id number]::@   Pulse ox interpretation: I interpret this pulse ox as normal.  Genl: F  sitting in gurney comfortably, speaking clearly, appears anxious but in no acute distress   Head: NC/AT   ENT: Mucous membranes dry, posterior pharynx clear, uvula midline, nares patent bilaterally   Eyes: Normal sclera, pupils equal round reactive to light  Neck: Supple, FROM, no LAD appreciated   Pulmonary: Lungs are clear to auscultation bilaterally  Chest: No TTP  CV:  RRR, no murmur appreciated, pulses 2+ in both upper and lower extremities,  Abdomen: soft, NT/ND; no rebound/guarding, no masses palpated, no HSM   : no CVA or suprapubic tenderness   Musculoskeletal: Pain free ROM of the neck. Moving upper and lower extremities and spontaneous in coordinated fashion  Neuro: Mental Status: Speech fluent without errors. Follows all commands. No dysarthria or apraxia.  Cranial Nerves: Pupils equal round and reactive to light. Extraocular motion intact. Visual fields intact. No nystagmus. CN V1-V3 intact to light touch. No facial asymmetry. Hearing clinically intact bilaterally. Tongue protrusion midline. No uvular deviation. Normal shoulder shrug and head turn.  Motor:  RUE: 5/5 with hand , 5/5 with flexion at the elbow 5/5 with extension at the elbow  LUE: 5/5 with hand , 5/5 with flexion at the elbow 5/5 with extension at the elbow  RLE: 5/5 with leg raise, 5/5 with plantar flexion, 5/5 with dorsal flexion  LLE: 5/5 with leg raise, 5/5 with plantar flexion, 5/5 with dorsal flexion  Sensation to light touch intact throughout, Reflexes 2+ Patellar tendons  No ataxia noted, Rapidly alternating movements without difficulty  Psych: Patient has an appropriate affect and behavior  Skin: No rash or lesions.  No pallor or jaundice.  No cyanosis.  Warm and dry.     DIAGNOSTIC STUDIES / PROCEDURES    Results for orders placed or performed during the hospital encounter of 04/12/19   CBC WITH DIFFERENTIAL   Result Value Ref Range    WBC 5.5 4.8 - 10.8 K/uL    RBC 5.91 (H) 4.20 - 5.40 M/uL    Hemoglobin 12.2 12.0 -  16.0 g/dL    Hematocrit 37.7 37.0 - 47.0 %    MCV 63.8 (L) 81.4 - 97.8 fL    MCH 20.6 (L) 27.0 - 33.0 pg    MCHC 32.4 (L) 33.6 - 35.0 g/dL    RDW 30.0 (L) 35.9 - 50.0 fL    Platelet Count 277 164 - 446 K/uL    MPV 10.2 9.0 - 12.9 fL    Neutrophils-Polys 48.40 44.00 - 72.00 %    Lymphocytes 36.10 22.00 - 41.00 %    Monocytes 7.00 0.00 - 13.40 %    Eosinophils 7.00 (H) 0.00 - 6.90 %    Basophils 1.30 0.00 - 1.80 %    Immature Granulocytes 0.20 0.00 - 0.90 %    Nucleated RBC 0.00 /100 WBC    Neutrophils (Absolute) 2.64 2.00 - 7.15 K/uL    Lymphs (Absolute) 1.97 1.00 - 4.80 K/uL    Monos (Absolute) 0.38 0.00 - 0.85 K/uL    Eos (Absolute) 0.38 0.00 - 0.51 K/uL    Baso (Absolute) 0.07 0.00 - 0.12 K/uL    Immature Granulocytes (abs) 0.01 0.00 - 0.11 K/uL    NRBC (Absolute) 0.00 K/uL   COMP METABOLIC PANEL   Result Value Ref Range    Sodium 136 135 - 145 mmol/L    Potassium 3.4 (L) 3.6 - 5.5 mmol/L    Chloride 104 96 - 112 mmol/L    Co2 21 20 - 33 mmol/L    Anion Gap 11.0 0.0 - 11.9    Glucose 91 65 - 99 mg/dL    Bun 8 8 - 22 mg/dL    Creatinine 0.94 0.50 - 1.40 mg/dL    Calcium 8.7 8.4 - 10.2 mg/dL    AST(SGOT) 19 12 - 45 U/L    ALT(SGPT) 19 2 - 50 U/L    Alkaline Phosphatase 33 30 - 99 U/L    Total Bilirubin 1.0 0.1 - 1.5 mg/dL    Albumin 4.1 3.2 - 4.9 g/dL    Total Protein 6.9 6.0 - 8.2 g/dL    Globulin 2.8 1.9 - 3.5 g/dL    A-G Ratio 1.5 g/dL   URINALYSIS CULTURE, IF INDICATED   Result Value Ref Range    Color Yellow     Character Clear     Specific Gravity 1.020 <1.035    Ph 5.5 5.0 - 8.0    Glucose Negative Negative mg/dL    Ketones >=80 (A) Negative mg/dL    Protein Negative Negative mg/dL    Bilirubin Small (A) Negative    Nitrite Negative Negative    Leukocyte Esterase Negative Negative    Occult Blood Trace (A) Negative    Micro Urine Req Microscopic    TSH   Result Value Ref Range    TSH 3.390 0.380 - 5.330 uIU/mL   FREE THYROXINE   Result Value Ref Range    Free T-4 1.30 0.58 - 1.64 ng/dL   HCG QUAL SERUM    Result Value Ref Range    Beta-Hcg Qualitative Serum Negative Negative   ESTIMATED GFR   Result Value Ref Range    GFR If African American >60 >60 mL/min/1.73 m 2    GFR If Non African American >60 >60 mL/min/1.73 m 2   URINE MICROSCOPIC (W/UA)   Result Value Ref Range    WBC 0-2 /hpf    RBC 0-2 /hpf    Bacteria Few (A) None /hpf    Epithelial Cells Few Few /hpf    Mucous Threads Moderate /hpf     COURSE & MEDICAL DECISION MAKING  Pertinent Labs & Imaging studies reviewed. (See chart for details)    DDX:  Dehydration  Anxiety  Stress  Post viral syndrome  ANNIA  Colitis  Cystitis  Thyroid dysfunction    MDM    Initial evaluation at 0130:  Patient presented the emergency room for concerns regarding the overall etiology of her multiple sensory changes on her skin.  The patient has been evaluated by a previous practitioner and without focal neurological deficits did not have any further investigative workup.  She now states that she has been having several episodes of diarrhea without blood and on my clinical exam she appears acutely dehydrated based on her dry mucous membranes.  IV access was established and basic labs electrolytes were obtained in addition to urinalysis.  Urine demonstrates dehydration based on the presence of ketones.  There is no signs of acute infectious etiology.  She has no anemia, no leukocytosis, no leftward shift, normal thyroid function panels, and is not pregnant.  The patient received symptomatic medications and hydration and felt much proved.  While she has some concerns about the possibility of MS.  She has no occular nystagmus, no consistent dermatomal neuropathy, no weakness or signs of instability on neuro exam.  She reports having disorientation and disequilibrium however she exhibits none of these findings on my clinical exam.  Due to the intermittent nature of this and relatively acute presenting symptomology, I recommend that she follow-up in a nonemergent fashion with her primary  care doctor.  If the symptoms do continue she may need a nonemergent MRI.  Prior CT demonstrated no mass-effect, no acute bleeds and at this time I do not think her overall symptomology is consistent with a stroke or TIA.  There may be a stress or anxiety component to this however the patient is somewhat reserved and validating this theory.  She does report a minimal post viral syndrome several weeks ago and in the setting of her most recent diarrhea episodes her polyneuropathy could have a viral etiology.  This is a diagnosis of exclusion and the patient is aware that we will not rely on the.  Questions addressed and she understands the importance of following up with her outpatient provider.    HYDRATION: Based on the patient's presentation of Dehydration the patient was given IV fluids. IV Hydration was used because oral hydration was not adequate alone. Upon recheck following hydration, the patient was improved.    The patient will not drink alcohol nor drive with prescribed medications. The patient will return for worsening symptoms and is stable at the time of discharge. The patient verbalizes understanding and will comply.    FINAL IMPRESSION  Visit Diagnoses     ICD-10-CM   1. Diarrhea, unspecified type R19.7   2. Dehydration E86.0   3. 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 Neuropathy (HCC) G62.9     Electronically signed by: Guilherme Baig, 4/12/2019 1:30 AM     no distention/soft/no masses palpable/bowel sounds normal/nontender

## 2020-10-21 NOTE — ADDENDUM
[FreeTextEntry1] : Addendum to note of October 28, 2020 which will serve as preoperative clearance.\par \par Comprehensive blood work reviewed stable for patient hemoglobin A1c 6.8 patient will be started on glimepiride that is not a critical level, coags are within normal limits.\par \par There is no medical contraindication for the proposed procedure.

## 2020-10-21 NOTE — H&P PST ADULT - NSANTHOSAYNRD_GEN_A_CORE
No. ARMIDA screening performed.  STOP BANG Legend: 0-2 = LOW Risk; 3-4 = INTERMEDIATE Risk; 5-8 = HIGH Risk

## 2020-10-22 ENCOUNTER — TRANSCRIPTION ENCOUNTER (OUTPATIENT)
Age: 75
End: 2020-10-22

## 2020-10-23 ENCOUNTER — RESULT REVIEW (OUTPATIENT)
Age: 75
End: 2020-10-23

## 2020-10-23 ENCOUNTER — OUTPATIENT (OUTPATIENT)
Dept: OUTPATIENT SERVICES | Facility: HOSPITAL | Age: 75
LOS: 1 days | End: 2020-10-23
Payer: MEDICARE

## 2020-10-23 VITALS
WEIGHT: 179.9 LBS | TEMPERATURE: 97 F | HEIGHT: 64 IN | HEART RATE: 62 BPM | OXYGEN SATURATION: 100 % | DIASTOLIC BLOOD PRESSURE: 85 MMHG | RESPIRATION RATE: 16 BRPM | SYSTOLIC BLOOD PRESSURE: 153 MMHG

## 2020-10-23 VITALS
DIASTOLIC BLOOD PRESSURE: 72 MMHG | OXYGEN SATURATION: 100 % | SYSTOLIC BLOOD PRESSURE: 157 MMHG | HEART RATE: 56 BPM | RESPIRATION RATE: 18 BRPM

## 2020-10-23 DIAGNOSIS — N84.0 POLYP OF CORPUS UTERI: ICD-10-CM

## 2020-10-23 DIAGNOSIS — Z01.818 ENCOUNTER FOR OTHER PREPROCEDURAL EXAMINATION: ICD-10-CM

## 2020-10-23 DIAGNOSIS — Z90.49 ACQUIRED ABSENCE OF OTHER SPECIFIED PARTS OF DIGESTIVE TRACT: Chronic | ICD-10-CM

## 2020-10-23 PROCEDURE — 88305 TISSUE EXAM BY PATHOLOGIST: CPT

## 2020-10-23 PROCEDURE — 58558 HYSTEROSCOPY BIOPSY: CPT

## 2020-10-23 PROCEDURE — 88305 TISSUE EXAM BY PATHOLOGIST: CPT | Mod: 26

## 2020-10-23 RX ORDER — L.ACIDOPH/B.ANIMALIS/B.LONGUM 15B CELL
1 CAPSULE ORAL
Qty: 0 | Refills: 0 | DISCHARGE

## 2020-10-23 RX ORDER — OXYCODONE HYDROCHLORIDE 5 MG/1
5 TABLET ORAL ONCE
Refills: 0 | Status: DISCONTINUED | OUTPATIENT
Start: 2020-10-23 | End: 2020-10-23

## 2020-10-23 RX ORDER — ASCORBIC ACID 60 MG
1 TABLET,CHEWABLE ORAL
Qty: 0 | Refills: 0 | DISCHARGE

## 2020-10-23 RX ORDER — FENTANYL CITRATE 50 UG/ML
25 INJECTION INTRAVENOUS
Refills: 0 | Status: DISCONTINUED | OUTPATIENT
Start: 2020-10-23 | End: 2020-10-23

## 2020-10-23 RX ORDER — ALBUTEROL 90 UG/1
2 AEROSOL, METERED ORAL
Qty: 0 | Refills: 0 | DISCHARGE

## 2020-10-23 RX ORDER — ALBUTEROL 90 UG/1
3 AEROSOL, METERED ORAL
Qty: 0 | Refills: 0 | DISCHARGE

## 2020-10-23 RX ORDER — MILK THISTLE 150 MG
400 CAPSULE ORAL
Qty: 0 | Refills: 0 | DISCHARGE

## 2020-10-23 RX ORDER — ACETAMINOPHEN 500 MG
2 TABLET ORAL
Qty: 0 | Refills: 0 | DISCHARGE

## 2020-10-23 RX ORDER — IBUPROFEN 200 MG
1 TABLET ORAL
Qty: 0 | Refills: 0 | DISCHARGE

## 2020-10-23 RX ORDER — DONEPEZIL HYDROCHLORIDE 10 MG/1
1 TABLET, FILM COATED ORAL
Qty: 0 | Refills: 0 | DISCHARGE

## 2020-10-23 RX ORDER — ONDANSETRON 8 MG/1
4 TABLET, FILM COATED ORAL ONCE
Refills: 0 | Status: DISCONTINUED | OUTPATIENT
Start: 2020-10-23 | End: 2020-11-06

## 2020-10-23 RX ORDER — SODIUM CHLORIDE 9 MG/ML
1000 INJECTION, SOLUTION INTRAVENOUS
Refills: 0 | Status: DISCONTINUED | OUTPATIENT
Start: 2020-10-23 | End: 2020-11-06

## 2020-10-23 NOTE — BRIEF OPERATIVE NOTE - NSICDXBRIEFPROCEDURE_GEN_ALL_CORE_FT
PROCEDURES:  Dilation and curettage, with polypectomy 23-Oct-2020 12:33:48  Jayashree Gee  Hysteroscopy with biopsy or polypectomy 23-Oct-2020 12:33:16  Jayashree Gee  Exam under anesthesia, pelvis 23-Oct-2020 12:33:04  Jayashree Gee

## 2020-10-23 NOTE — ASU DISCHARGE PLAN (ADULT/PEDIATRIC) - CALL YOUR DOCTOR IF YOU HAVE ANY OF THE FOLLOWING:
Wound/Surgical Site with redness, or foul smelling discharge or pus/Pain not relieved by Medications/Fever greater than (need to indicate Fahrenheit or Celsius)/Bleeding that does not stop/Nausea and vomiting that does not stop/Unable to urinate/Inability to tolerate liquids or foods

## 2020-10-23 NOTE — ASU DISCHARGE PLAN (ADULT/PEDIATRIC) - NURSING INSTRUCTIONS
******************************************************************************************  Next dose of TYLENOL may be taken at or after _______5:45_____ PM if needed. DO NOT take any additional products containing TYLENOL or ACETAMINOPHEN, such as VICODIN, PERCOCET, NORCO, EXCEDRIN, and any over-the-counter cold medications until this time. DO NOT CONSUME MORE THAN 2379-1537 MG OF TYLENOL (acetaminophen) in a 24-hour period.   ******************************************************************************************

## 2020-10-23 NOTE — ASU PATIENT PROFILE, ADULT - PMH
Asthma  well-controlled on medication, no recent hospitalizations  Cognitive dysfunction  forgetfulness, takes donepezil  COPD with asthma    Diverticulitis of colon (without mention of hemorrhage)  2011  History of Hypertension    Hypercholesterolemia    Hypogammaglobulinemia  no treatment currently, noted in chart 8/2012  Pneumonia  2011    Type 2 diabetes mellitus  no current medications, Z1C 10/2020 = 6.9  Uterine polyp    Vertigo  hospitalized,12/2019; underwent cardiac & neuro eval

## 2020-10-23 NOTE — ASU DISCHARGE PLAN (ADULT/PEDIATRIC) - CARE PROVIDER_API CALL
Ivis Bal  [facility]  [address] [id number]  Phone: [phone]  Fax: [phone]  Follow Up Time:

## 2020-10-23 NOTE — ASU PATIENT PROFILE, ADULT - PSH
colostomy 1/2011  reversal 8/11 - perforated colon, diverticulitis  History of cholecystectomy  2012 with hernia repair  History of Dilatation and Curettage

## 2020-10-23 NOTE — BRIEF OPERATIVE NOTE - OPERATION/FINDINGS
EUA: mobile, postmenopausal uterus, nonpalpable adnexa. Hysteroscopy revealed a normal-appearing postmenopausal uterus with atropic lining. Singular 5mm polyp was visible on the left lateral wall of the uterus.

## 2020-10-26 ENCOUNTER — NON-APPOINTMENT (OUTPATIENT)
Age: 75
End: 2020-10-26

## 2020-10-26 DIAGNOSIS — Z81.8 FAMILY HISTORY OF OTHER MENTAL AND BEHAVIORAL DISORDERS: ICD-10-CM

## 2020-10-26 DIAGNOSIS — F43.9 REACTION TO SEVERE STRESS, UNSPECIFIED: ICD-10-CM

## 2020-10-26 DIAGNOSIS — R32 UNSPECIFIED URINARY INCONTINENCE: ICD-10-CM

## 2020-10-28 LAB — SURGICAL PATHOLOGY STUDY: SIGNIFICANT CHANGE UP

## 2020-11-02 ENCOUNTER — TRANSCRIPTION ENCOUNTER (OUTPATIENT)
Age: 75
End: 2020-11-02

## 2020-11-03 ENCOUNTER — EMERGENCY (EMERGENCY)
Facility: HOSPITAL | Age: 75
LOS: 1 days | Discharge: HOME | End: 2020-11-03
Attending: EMERGENCY MEDICINE | Admitting: EMERGENCY MEDICINE
Payer: MEDICARE

## 2020-11-03 VITALS
TEMPERATURE: 98 F | HEART RATE: 75 BPM | HEIGHT: 65 IN | WEIGHT: 179.9 LBS | DIASTOLIC BLOOD PRESSURE: 94 MMHG | RESPIRATION RATE: 18 BRPM | SYSTOLIC BLOOD PRESSURE: 154 MMHG | OXYGEN SATURATION: 95 %

## 2020-11-03 DIAGNOSIS — Z90.49 ACQUIRED ABSENCE OF OTHER SPECIFIED PARTS OF DIGESTIVE TRACT: Chronic | ICD-10-CM

## 2020-11-03 DIAGNOSIS — L03.012 CELLULITIS OF LEFT FINGER: ICD-10-CM

## 2020-11-03 PROBLEM — J44.9 CHRONIC OBSTRUCTIVE PULMONARY DISEASE, UNSPECIFIED: Chronic | Status: ACTIVE | Noted: 2020-10-21

## 2020-11-03 PROBLEM — R42 DIZZINESS AND GIDDINESS: Chronic | Status: ACTIVE | Noted: 2020-10-21

## 2020-11-03 PROBLEM — N84.0 POLYP OF CORPUS UTERI: Chronic | Status: ACTIVE | Noted: 2020-10-21

## 2020-11-03 PROBLEM — F09 UNSPECIFIED MENTAL DISORDER DUE TO KNOWN PHYSIOLOGICAL CONDITION: Chronic | Status: ACTIVE | Noted: 2020-10-21

## 2020-11-03 PROBLEM — J45.909 UNSPECIFIED ASTHMA, UNCOMPLICATED: Chronic | Status: ACTIVE | Noted: 2020-10-21

## 2020-11-03 PROBLEM — D80.1 NONFAMILIAL HYPOGAMMAGLOBULINEMIA: Chronic | Status: ACTIVE | Noted: 2020-10-21

## 2020-11-03 PROBLEM — E11.9 TYPE 2 DIABETES MELLITUS WITHOUT COMPLICATIONS: Chronic | Status: ACTIVE | Noted: 2020-10-21

## 2020-11-03 PROCEDURE — 10061 I&D ABSCESS COMP/MULTIPLE: CPT

## 2020-11-03 PROCEDURE — 99283 EMERGENCY DEPT VISIT LOW MDM: CPT

## 2020-11-03 PROCEDURE — 87205 SMEAR GRAM STAIN: CPT

## 2020-11-03 PROCEDURE — 87070 CULTURE OTHR SPECIMN AEROBIC: CPT

## 2020-11-03 PROCEDURE — 99284 EMERGENCY DEPT VISIT MOD MDM: CPT | Mod: 25

## 2020-11-03 NOTE — ED PROVIDER NOTE - NSFOLLOWUPINSTRUCTIONS_ED_ALL_ED_FT
Keep covered and dry.    Take Clindamycin 450mg every 8 hours for 1 week.    Any increased pain, redness, fever, chills return to ED     Follow up 11/6 with:  Dr. Nakul Chun  [address] [place] [address] [address]  [place] [address]  [phone]  Fax: [phone]      Abscess    An abscess is an infected area that contains a collection of pus and debris. It can occur in almost any part of the body and occurs when the tissue gets infection. Symptoms include a painful mass that is red, warm, tender that might break open and HAVE drainage. If your health care provider gave you antibiotics make sure to take the full course and do not stop even if feeling better.     SEEK IMMEDIATE MEDICAL CARE IF YOU HAVE ANY OF THE FOLLOWING SYMPTOMS: chills, fever, muscle aches, or red streaking from the area.

## 2020-11-03 NOTE — ED PROVIDER NOTE - PHYSICAL EXAMINATION
General:     NAD   Eyes: PERRL  Head:     NC/AT, EOMI, oral mucosa moist  Neck:     trachea midline  Lungs:     CTA b/l  CVS:     RRR  Abd:     +BS, s/nt/nd  Ext:  left 4th digit: swelling from DIP to distal tip with more swelling at nail bed  Neuro: AAOx3 General:     NAD   Eyes: PERRL  Head:     NC/AT, EOMI, oral mucosa moist  Neck:     trachea midline  Lungs:     CTA b/l  CVS:     RRR  Abd:     +BS, s/nt/nd  Ext:  left 4th digit: swelling from DIP to distal tip with more swelling at nail bed. surrounding erythema and swelling to finger pad. no streaking. joint: FROM, soft compartments, sensation intact  Neuro: AAOx3

## 2020-11-03 NOTE — ED ADULT TRIAGE NOTE - CHIEF COMPLAINT QUOTE
Pt has swollen, red left hand 4th ring finger.  Pt states it started 4-5days with redness and swelling.

## 2020-11-03 NOTE — ED PROVIDER NOTE - CLINICAL SUMMARY MEDICAL DECISION MAKING FREE TEXT BOX
extensive finger infection x 5 days. Ext:  left 4th digit: swelling from DIP to distal tip with more swelling at nail bed. surrounding erythema and swelling to finger pad. no streaking. joint: FROM, soft compartments, sensation intact  plastics drained iin ED. will follow up 11/6. Discussed with patient need to return to ED if symptoms don't continue to improve or recur or develops any new or worsening symptoms that are of concern.

## 2020-11-03 NOTE — ED PROVIDER NOTE - PATIENT PORTAL LINK FT
You can access the FollowMyHealth Patient Portal offered by [facility] by registering at the following website: [email or web address]. By joining [facility]’s FollowMyHealth portal, you will also be able to view your health information using other applications (apps) compatible with our system.

## 2020-11-03 NOTE — CONSULT NOTE ADULT - SUBJECTIVE AND OBJECTIVE BOX
VERA LOMBARDO  [id number]   ED    75yFemale, RHD, presents to the ER with left ring finger swelling, redness and pain for 3 days .  Patient denies weakness or numbness in the hand.  Patient denies other injuries.    PMHx/PSHx:  No pertinent family history in first degree relatives    Hypogammaglobulinemia    Uterine polyp    Type 2 diabetes mellitus    Cognitive dysfunction    COPD with asthma    Vertigo    Asthma    Diverticulitis of colon (without mention of hemorrhage)    Pneumonia  2011    Antibody Deficiency Syndrome    Hypercholesterolemia    History of Hypertension    Asthma  /Chronic brochitiss    Paronychia of finger of left hand    History of cholecystectomy    colostomy 1/2011    History of Dilatation and Curettage    Bactrim (Short breath)  penicillins (Rash)  Zyflo (Stomach Upset; Vomiting)    T(C): 36.8 (11-03-20 @ 11:38), Max: 36.8 (11-03-20 @ 11:38)  HR: 75 (11-03-20 @ 11:38) (75 - 75)  BP: 154/94 (11-03-20 @ 11:38) (154/94 - 154/94)  RR: 18 (11-03-20 @ 11:38) (18 - 18)  SpO2: 95% (11-03-20 @ 11:38) (95% - 95%)  NAD  Left Ring Finger:  Erythema, fluctuance on radial border of distal phalanx.  +FDS/+FDP/+Extension in DIP/PIP/MCP joints of all digits.  Cap refill <3s.  +UDN/+RDN in all digits.  Soft compartments.      Procedure:  Finger digital block.  1cm incision with evacuation of purulent fluid. Washout of wound with betadine.  Excisional debridement skin including subcutaneous layer. Packing placed.  Antibotic dressing applied with splint.    A/P: 75yy/o with left ring finger abscess s/p drainage and debridement.   - LUE elevation  - Pain control  - Abx  - Maintain splint  - F/U 2-3 days  - Patient educated on warning signs to prompt ER return    Thank You  Nakul Chun MD  Plastic Surgery

## 2020-11-03 NOTE — ED PROVIDER NOTE - OBJECTIVE STATEMENT
pt 75y f c/o left 4th digit pain and swelling for 5 days. +worsening. pain with rom. didn't take anything for pain  denies fever, chills, numbness, tingling, weakness, n/v. no hx abscess. denies trauma pt 75y f c/o left 4th digit pain and swelling for 5 days. +worsening. pain with ROM. Didn't take anything for pain  Denies fever, chills, numbness, tingling, weakness, n/v. no hx abscess. denies trauma

## 2020-11-03 NOTE — ED PROVIDER NOTE - ATTENDING CONTRIBUTION TO CARE
Eval with AGUSTIN Motley. extensive finger infection x 5 days. Ext:  left 4th digit: swelling from DIP to distal tip with more swelling at nail bed. surrounding erythema and swelling to finger pad. no streaking. joint: FROM, soft compartments, sensation intact  plastics drained iin ED. will follow up 11/6. Discussed with patient need to return to ED if symptoms don't continue to improve or recur or develops any new or worsening symptoms that are of concern.  I performed a face to face bedside interview with patient regarding history of present illness, review of symptoms and past medical history. I completed an independent physical exam.  I have discussed the patient's plan of care with Physician Assistant (PA). I agree with note as stated above, having amended the EMR as needed to reflect my findings.   This includes History of Present Illness, HIV, Past Medical/Surgical/Family/Social History, Allergies and Home Medications, Review of Systems, Physical Exam, and any Progress Notes during the time I functioned as the attending physician for this patient.

## 2020-11-08 LAB
CULTURE RESULTS: SIGNIFICANT CHANGE UP
SPECIMEN SOURCE: SIGNIFICANT CHANGE UP

## 2020-11-19 ENCOUNTER — APPOINTMENT (OUTPATIENT)
Dept: NEUROLOGY | Facility: CLINIC | Age: 75
End: 2020-11-19
Payer: MEDICARE

## 2020-11-19 VITALS — DIASTOLIC BLOOD PRESSURE: 74 MMHG | SYSTOLIC BLOOD PRESSURE: 152 MMHG | HEART RATE: 76 BPM

## 2020-11-19 VITALS — TEMPERATURE: 97.1 F

## 2020-11-19 PROCEDURE — 99214 OFFICE O/P EST MOD 30 MIN: CPT

## 2020-11-19 RX ORDER — BACILLUS COAGULANS/INULIN 1B-250 MG
CAPSULE ORAL
Refills: 0 | Status: ACTIVE | COMMUNITY

## 2020-11-19 RX ORDER — DONEPEZIL HYDROCHLORIDE 5 MG/1
5 TABLET ORAL DAILY
Qty: 90 | Refills: 0 | Status: DISCONTINUED | COMMUNITY
Start: 2019-12-27 | End: 2020-11-19

## 2020-11-19 NOTE — HISTORY OF PRESENT ILLNESS
[FreeTextEntry1] : Mrs. Patrica Mane was last evaluated on August 10, 2020 via telemedicine. She is a 75-year-old right-handed patient with mild cognitive impairment and a family history of dementia.  She was accompanied to the office by her daughter Fatou.\par \par Mrs. Mane remains in her a stressful home environment with a chronically ill .  She has been more compliant with donepezil 10 mg daily.  She is mildly repetitive.  She is having mild difficulty paying the household bills.  She however is totally independent being able to dress, bathe and toilet herself.  She is anxious.

## 2020-11-19 NOTE — CONSULT LETTER
[Dear  ___] : Dear  [unfilled], [Consult Letter:] : I had the pleasure of evaluating your patient, [unfilled]. [Please see my note below.] : Please see my note below. [Consult Closing:] : Thank you very much for allowing me to participate in the care of this patient.  If you have any questions, please do not hesitate to contact me. [Sincerely,] : Sincerely, [FreeTextEntry3] : Harmeet Mathews MD\par

## 2020-11-19 NOTE — ASSESSMENT
[FreeTextEntry1] : Mrs. Otto is a 75-year-old with mild cognitive impairment and a strong family history of dementia. Her diagnostic evaluation was unrevealing. An early neurodegenerative disorder, presumably Alzheimer's disease is suspected.  She has significant anxiety which is complicating matters.  \par \par She will continue donepezil 10 mg daily.  She will consider taking low-dose Escitalopram 5 mg daily to address her anxiety and coping mechanisms.  She will return to the office in 4 months for follow-up.  Telephone contact will be maintained in the meantime.

## 2020-11-19 NOTE — PHYSICAL EXAM
[FreeTextEntry1] : Constitutional:  Patient was well-developed, well-nourished and in no acute distress. \par \par Head:  Normocephalic, atraumatic. Tympanic membranes were clear. \par \par Neck:  Supple with full range of motion. \par \par Cardiovascular:  Cardiac rhythm was regular without murmur. There were no carotid bruits. Peripheral pulses were full and symmetric. \par \par Respiratory:  Lungs were clear. \par \par Abdomen:  Soft and nontender. \par \par Spine:  Nontender. \par \par Skin:  There were no rashes. \par \par NEUROLOGICAL EXAMINATION:\par \par Mental Status: She was alert and oriented. Speech was fluent. There was no dysarthria.  She scored 25 out of 30 on MMSE which was a two-point decline compared to February 2020.  She made 2 errors in orientation to date and had no recall of 3 words after several minutes.\par \par Cranial Nerves: \par \par II: Visual acuity was 20/ 20 bilaterally with near card. Pupils were equal and reactive. Visual fields were full. Funduscopic examination was normal. \par \par III, IV, VI:  Eye movements were full without nystagmus. \par \par V: Facial sensation was intact. \par \par VII: Facial strength was normal. \par \par VIII: Hearing was equal. \par \par IX, X: Palatal movement was normal. Phonation was normal. \par \par XI: Sternocleidomastoids and trapezii were normal. \par \par XII: Tongue was midline and movements normal. There was no lingual atrophy or fasciculations. \par \par Motor Examination: Muscle bulk, tone and strength were normal. \par \par Sensory Examination: Pinprick, vibration and joint position sense were intact. \par \par Reflexes: DTRs were 2 the biceps and absent elsewhere.\par \par Plantar Responses: Plantar responses were flexor. \par \par Coordination/Cerebellar Function: There was no dysmetria on finger to nose or heel to shin testing. \par \par Gait/Stance: Gait was stable.  Tandem was mildly unsteady. Romberg was negative.\par

## 2021-04-19 ENCOUNTER — APPOINTMENT (OUTPATIENT)
Dept: NEUROLOGY | Facility: CLINIC | Age: 76
End: 2021-04-19
Payer: MEDICARE

## 2021-04-19 VITALS
HEIGHT: 65 IN | SYSTOLIC BLOOD PRESSURE: 134 MMHG | WEIGHT: 181 LBS | DIASTOLIC BLOOD PRESSURE: 79 MMHG | BODY MASS INDEX: 30.16 KG/M2 | HEART RATE: 88 BPM

## 2021-04-19 VITALS — TEMPERATURE: 94.5 F

## 2021-04-19 PROCEDURE — 99213 OFFICE O/P EST LOW 20 MIN: CPT

## 2021-04-19 RX ORDER — BLOOD SUGAR DIAGNOSTIC
STRIP MISCELLANEOUS
Qty: 100 | Refills: 0 | Status: DISCONTINUED | COMMUNITY
Start: 2021-01-22

## 2021-04-19 RX ORDER — CLINDAMYCIN HYDROCHLORIDE 150 MG/1
150 CAPSULE ORAL
Qty: 63 | Refills: 0 | Status: DISCONTINUED | COMMUNITY
Start: 2020-11-03

## 2021-04-19 RX ORDER — BLOOD-GLUCOSE METER
W/DEVICE EACH MISCELLANEOUS
Qty: 1 | Refills: 0 | Status: DISCONTINUED | COMMUNITY
Start: 2021-01-22

## 2021-04-19 RX ORDER — LANCETS
EACH MISCELLANEOUS
Qty: 100 | Refills: 0 | Status: DISCONTINUED | COMMUNITY
Start: 2021-01-22

## 2021-04-19 RX ORDER — ESCITALOPRAM OXALATE 5 MG/1
5 TABLET ORAL DAILY
Qty: 90 | Refills: 1 | Status: DISCONTINUED | COMMUNITY
Start: 2020-11-19 | End: 2021-04-19

## 2021-04-19 RX ORDER — FLUOCINONIDE 0.05 MG/G
0.05 OINTMENT TOPICAL
Qty: 60 | Refills: 0 | Status: DISCONTINUED | COMMUNITY
Start: 2020-10-27

## 2021-04-19 NOTE — PHYSICAL EXAM
[FreeTextEntry1] : Constitutional:  Patient was well-developed, well-nourished and in no acute distress. \par \par Head:  Normocephalic, atraumatic. Tympanic membranes were clear. \par \par Neck:  Supple with full range of motion. \par \par Cardiovascular:  Cardiac rhythm was regular without murmur. There were no carotid bruits. Peripheral pulses were full and symmetric. \par \par Respiratory:  Lungs were clear. \par \par Abdomen:  Soft and nontender. \par \par Spine:  Nontender. \par \par Skin:  There were no rashes. \par \par NEUROLOGICAL EXAMINATION:\par \par Mental Status: She was alert and oriented. Speech was fluent. There was no dysarthria.  She again scored 25 out of 30 on MMSE.  She recalled 1 of 3 words.  She made 2 errors in orientation.  She had difficulty copying interlocking pentagons.\par \par Cranial Nerves: \par \par II: She could finger count bilaterally. Pupils were equal and reactive. Visual fields were full. Funduscopic examination was normal. \par \par III, IV, VI:  Eye movements were full without nystagmus. \par \par V: Facial sensation was intact. \par \par VII: Facial strength was normal. \par \par VIII: Hearing was equal. \par \par IX, X: Palatal movement was normal. Phonation was normal. \par \par XI: Sternocleidomastoids and trapezii were normal. \par \par XII: Tongue was midline and movements normal. There was no lingual atrophy or fasciculations. \par \par Motor Examination: Muscle bulk, tone and strength were normal. \par \par Sensory Examination: Pinprick, vibration and joint position sense were intact. \par \par Reflexes: DTRs were 2 the biceps and absent elsewhere.\par \par Plantar Responses: Plantar responses were flexor. \par \par Coordination/Cerebellar Function: There was no dysmetria on finger to nose or heel to shin testing. \par \par Gait/Stance: Gait was stable.  Tandem was mildly unsteady. Romberg was negative.\par

## 2021-04-19 NOTE — HISTORY OF PRESENT ILLNESS
[FreeTextEntry1] : Mrs. Patrica Mane returned to the office having been last seen on September 19, 2020. She is a 76-year-old right-handed patient with mild cognitive impairment and a family history of dementia.  She was accompanied to the office by her daughter Fatou.\par \par Mrs. Mane remains in her a stressful home environment with a chronically ill .  She remains on donepezil 10 mg daily.  She refused to take Escitalopram for anxiety.  She is mildly repetitive possibly a bit worse.  She is still having mild difficulty paying the household bills.  She however is totally independent being able to dress, bathe and toilet herself.  She is anxious.

## 2021-04-19 NOTE — ASSESSMENT
[FreeTextEntry1] : Mrs. Otto is a 76-year-old with mild cognitive impairment and a strong family history of dementia.  An early neurodegenerative disorder, possibly Alzheimer's disease is suspected.  She has significant anxiety which is complicating matters.  \par \par She will continue donepezil 10 mg daily.  She refuses treatment with an antidepressant.  She will consider entering the IDEAS 2 study.  Further management will depend upon her clinical course.

## 2021-05-11 ENCOUNTER — TRANSCRIPTION ENCOUNTER (OUTPATIENT)
Age: 76
End: 2021-05-11

## 2021-05-17 ENCOUNTER — NON-APPOINTMENT (OUTPATIENT)
Age: 76
End: 2021-05-17

## 2021-10-05 ENCOUNTER — APPOINTMENT (OUTPATIENT)
Dept: OBGYN | Facility: CLINIC | Age: 76
End: 2021-10-05
Payer: MEDICARE

## 2021-10-05 ENCOUNTER — ASOB RESULT (OUTPATIENT)
Age: 76
End: 2021-10-05

## 2021-10-05 VITALS
WEIGHT: 178 LBS | SYSTOLIC BLOOD PRESSURE: 120 MMHG | HEIGHT: 67 IN | DIASTOLIC BLOOD PRESSURE: 80 MMHG | BODY MASS INDEX: 27.94 KG/M2

## 2021-10-05 PROCEDURE — G0101: CPT | Mod: GA

## 2021-10-05 PROCEDURE — G0328 FECAL BLOOD SCRN IMMUNOASSAY: CPT | Mod: QW

## 2021-10-05 PROCEDURE — 76830 TRANSVAGINAL US NON-OB: CPT

## 2021-10-06 LAB — HPV HIGH+LOW RISK DNA PNL CVX: NOT DETECTED

## 2021-10-07 ENCOUNTER — APPOINTMENT (OUTPATIENT)
Dept: NEUROLOGY | Facility: CLINIC | Age: 76
End: 2021-10-07
Payer: MEDICARE

## 2021-10-07 VITALS
HEART RATE: 74 BPM | SYSTOLIC BLOOD PRESSURE: 141 MMHG | WEIGHT: 176 LBS | BODY MASS INDEX: 27.62 KG/M2 | DIASTOLIC BLOOD PRESSURE: 77 MMHG | HEIGHT: 67 IN

## 2021-10-07 PROCEDURE — 99213 OFFICE O/P EST LOW 20 MIN: CPT

## 2021-10-07 NOTE — ASSESSMENT
[FreeTextEntry1] : Mrs. Otto is a 76-year-old with mild cognitive impairment and a strong family history of dementia.  She is now exhibiting compulsive behavior symptoms.  She will now agree to undergo an FDG PET scan of the brain.  If concerns persist after that study, a formal neuropsychological evaluation will be performed.  I suggested close telephone and office follow-up.

## 2021-10-07 NOTE — PHYSICAL EXAM
[FreeTextEntry1] : Constitutional:  Patient was well-developed, well-nourished and in no acute distress. \par \par Head:  Normocephalic, atraumatic. Tympanic membranes were clear. \par \par Neck:  Supple with full range of motion. \par \par Cardiovascular:  Cardiac rhythm was regular without murmur. There were no carotid bruits. Peripheral pulses were full and symmetric. \par \par Respiratory:  Lungs were clear. \par \par Abdomen:  Soft and nontender. \par \par Spine:  Nontender. \par \par Skin:  There were no rashes. \par \par NEUROLOGICAL EXAMINATION:\par \par Mental Status: She was alert and oriented. Speech was fluent. There was no dysarthria.  She again scored 25 out of 30 on MMSE.  She recalled 0 of 3 words.  She made 2 errors in orientation.  \par \par Cranial Nerves: \par \par II: She could finger count bilaterally. Pupils were equal and reactive. Visual fields were full.  Fundi were not visualized.\par \par III, IV, VI:  Eye movements were full without nystagmus. \par \par V: Facial sensation was intact. \par \par VII: Facial strength was normal. \par \par VIII: Hearing was equal. \par \par IX, X: Palatal movement was normal. Phonation was normal. \par \par XI: Sternocleidomastoids and trapezii were normal. \par \par XII: Tongue was midline and movements normal. There was no lingual atrophy or fasciculations. \par \par Motor Examination: Muscle bulk, tone and strength were normal. \par \par Sensory Examination: Pinprick, vibration and joint position sense were intact. \par \par Reflexes: DTRs were 2 the biceps and absent elsewhere.\par \par Plantar Responses: Plantar responses were flexor. \par \par Coordination/Cerebellar Function: There was no dysmetria on finger to nose or heel to shin testing. \par \par Gait/Stance: Gait was stable.  Tandem was mildly unsteady. Romberg was negative.\par

## 2021-10-07 NOTE — HISTORY OF PRESENT ILLNESS
[FreeTextEntry1] : Mrs. Patrica Mane returned to the office having been last seen on April 19, 2021. She is a 76-year-old right-handed patient with mild cognitive impairment and a family history of dementia.  She was accompanied to the office by her daughter Fatou ([phone]).\par \par Mrs. Mane resides with her  who suffers from cancer.  She has been exhibiting compulsive behaviors like biting of her fingernails to the base.  Her memory fluctuates.  Her concentration is poor.  She sleeps well.  She however is totally independent being able to dress, bathe and toilet herself.  She is anxious.  She remains on donepezil 10 mg daily.

## 2021-10-07 NOTE — REVIEW OF SYSTEMS
[Memory Lapses or Loss] : memory loss [Anxiety] : anxiety [Negative] : Heme/Lymph [de-identified] : Compulsive behaviors

## 2021-10-11 LAB — CYTOLOGY CVX/VAG DOC THIN PREP: ABNORMAL

## 2021-10-20 ENCOUNTER — APPOINTMENT (OUTPATIENT)
Dept: NEUROLOGY | Facility: CLINIC | Age: 76
End: 2021-10-20

## 2021-10-24 ENCOUNTER — OUTPATIENT (OUTPATIENT)
Dept: OUTPATIENT SERVICES | Facility: HOSPITAL | Age: 76
LOS: 1 days | End: 2021-10-24
Payer: MEDICARE

## 2021-10-24 ENCOUNTER — APPOINTMENT (OUTPATIENT)
Dept: NUCLEAR MEDICINE | Facility: IMAGING CENTER | Age: 76
End: 2021-10-24
Payer: MEDICARE

## 2021-10-24 DIAGNOSIS — R68.89 OTHER GENERAL SYMPTOMS AND SIGNS: ICD-10-CM

## 2021-10-24 DIAGNOSIS — Z90.49 ACQUIRED ABSENCE OF OTHER SPECIFIED PARTS OF DIGESTIVE TRACT: Chronic | ICD-10-CM

## 2021-10-24 PROCEDURE — 78608 BRAIN IMAGING (PET): CPT | Mod: 26,MH

## 2021-10-24 PROCEDURE — A9552: CPT

## 2021-10-24 PROCEDURE — 78608 BRAIN IMAGING (PET): CPT

## 2021-10-25 ENCOUNTER — NON-APPOINTMENT (OUTPATIENT)
Age: 76
End: 2021-10-25

## 2021-10-26 ENCOUNTER — NON-APPOINTMENT (OUTPATIENT)
Age: 76
End: 2021-10-26

## 2021-10-28 ENCOUNTER — NON-APPOINTMENT (OUTPATIENT)
Age: 76
End: 2021-10-28

## 2021-11-19 NOTE — ED ADULT TRIAGE NOTE - MEANS OF ARRIVAL
Chief Complaint   Patient presents with     Results     Discuss EEG results       Rosemary Wright LPN on 11/19/2021 at 3:21 PM     ambulatory

## 2021-12-09 ENCOUNTER — NON-APPOINTMENT (OUTPATIENT)
Age: 76
End: 2021-12-09

## 2021-12-09 ENCOUNTER — APPOINTMENT (OUTPATIENT)
Dept: FAMILY MEDICINE | Facility: CLINIC | Age: 76
End: 2021-12-09
Payer: MEDICARE

## 2021-12-09 VITALS
DIASTOLIC BLOOD PRESSURE: 80 MMHG | RESPIRATION RATE: 16 BRPM | TEMPERATURE: 96 F | WEIGHT: 181 LBS | SYSTOLIC BLOOD PRESSURE: 122 MMHG | HEART RATE: 80 BPM | OXYGEN SATURATION: 98 % | BODY MASS INDEX: 28.41 KG/M2 | HEIGHT: 67 IN

## 2021-12-09 PROCEDURE — 93000 ELECTROCARDIOGRAM COMPLETE: CPT

## 2021-12-09 PROCEDURE — G0439: CPT

## 2021-12-09 PROCEDURE — 36415 COLL VENOUS BLD VENIPUNCTURE: CPT

## 2021-12-09 RX ORDER — ATORVASTATIN CALCIUM 40 MG/1
40 TABLET, FILM COATED ORAL
Qty: 90 | Refills: 3 | Status: ACTIVE | COMMUNITY
Start: 2019-12-18

## 2021-12-09 NOTE — HEALTH RISK ASSESSMENT
[Good] : ~his/her~ current health as good [Fair] :  ~his/her~ mood as fair [No] : In the past 12 months have you used drugs other than those required for medical reasons? No [No falls in past year] : Patient reported no falls in the past year [2] : 2) Feeling down, depressed, or hopeless for more than half of the days (2) [1/2 of Days or More (2)] : 2.) Feeling down, depressed or hopeless? Half the days or more [Several Days (1)] : 7.) Trouble concentrating on things, such as reading a newspaper or watching television? Several days [Not at All (0)] : 9.) Thoughts that you would be off dead or of hurting yourself in some way? Not at all [Mild] : severity of depression is mild [PHQ-9 Positive] : PHQ-9 Positive [I have developed a follow-up plan documented below in the note.] : I have developed a follow-up plan documented below in the note. [] : No [Audit-CScore] : 0 [[id number]] : 7

## 2021-12-09 NOTE — PHYSICAL EXAM
[No Acute Distress] : no acute distress [Well Nourished] : well nourished [Well Developed] : well developed [Well-Appearing] : well-appearing [Normal Voice/Communication] : normal voice/communication [Normal Sclera/Conjunctiva] : normal sclera/conjunctiva [PERRL] : pupils equal round and reactive to light [EOMI] : extraocular movements intact [Normal Outer Ear/Nose] : the outer ears and nose were normal in appearance [Normal Oropharynx] : the oropharynx was normal [Normal TMs] : both tympanic membranes were normal [Normal Nasal Mucosa] : the nasal mucosa was normal [No JVD] : no jugular venous distention [No Lymphadenopathy] : no lymphadenopathy [Supple] : supple [Thyroid Normal, No Nodules] : the thyroid was normal and there were no nodules present [No Respiratory Distress] : no respiratory distress  [No Accessory Muscle Use] : no accessory muscle use [Clear to Auscultation] : lungs were clear to auscultation bilaterally [Normal Rate] : normal rate  [Regular Rhythm] : with a regular rhythm [Normal S1, S2] : normal S1 and S2 [No Murmur] : no murmur heard [No Carotid Bruits] : no carotid bruits [No Abdominal Bruit] : a ~M bruit was not heard ~T in the abdomen [No Varicosities] : no varicosities [Pedal Pulses Present] : the pedal pulses are present [No Edema] : there was no peripheral edema [No Palpable Aorta] : no palpable aorta [No Extremity Clubbing/Cyanosis] : no extremity clubbing/cyanosis [Soft] : abdomen soft [Non Tender] : non-tender [Non-distended] : non-distended [No Masses] : no abdominal mass palpated [No HSM] : no HSM [Normal Bowel Sounds] : normal bowel sounds [No Hernias] : no hernias [Normal Supraclavicular Nodes] : no supraclavicular lymphadenopathy [Normal Posterior Cervical Nodes] : no posterior cervical lymphadenopathy [Normal Anterior Cervical Nodes] : no anterior cervical lymphadenopathy [No CVA Tenderness] : no CVA  tenderness [No Spinal Tenderness] : no spinal tenderness [No Joint Swelling] : no joint swelling [Grossly Normal Strength/Tone] : grossly normal strength/tone [No Rash] : no rash [No Skin Lesions] : no skin lesions [Coordination Grossly Intact] : coordination grossly intact [No Focal Deficits] : no focal deficits [Normal Gait] : normal gait [Deep Tendon Reflexes (DTR)] : deep tendon reflexes were 2+ and symmetric [Speech Grossly Normal] : speech grossly normal [Normal Affect] : the affect was normal [Alert and Oriented x3] : oriented to person, place, and time [Normal Mood] : the mood was normal [Normal Insight/Judgement] : insight and judgment were intact [de-identified] : gyn [FreeTextEntry1] : GI [de-identified] : GYN [de-identified] : Dystrophic nails that his fingernails secondary to chronic trauma [de-identified] : forgetful

## 2021-12-09 NOTE — REVIEW OF SYSTEMS
[Memory Loss] : memory loss [Negative] : Heme/Lymph [Headache] : no headache [Dizziness] : no dizziness [Fainting] : no fainting [Confusion] : no confusion [Unsteady Walking] : no ataxia

## 2021-12-09 NOTE — CURRENT MEDS
[Side Effects] :  side effects [Lack of understanding] : lack of understanding [Yes] : Reviewed medication list for presence of high-risk medications. [Takes medication as prescribed] : does not take

## 2021-12-09 NOTE — ASSESSMENT
[FreeTextEntry1] : Assessment and plan:\par \par 1.  Comprehensive health maintenance physical exam was unremarkable no acute findings.\par \par 2.  Comprehensive blood work drawn in office which will include CBC with differential, comprehensive metabolic, lipid profile, hemoglobin A1c,  thyroid function tests and urine analysis.\par \par 3.  Electrocardiogram done in office shows no acute ST-T wave changes stable when compared with previous\par \par 4.  Comprehensive pulmonology exam was done by pulmonology full pulmonary function test was done stable for patient.\par \par 5.  Comprehensive cardiology exam done by cardiology patient given clean bill of health last year.\par \par 6.  Patient is up-to-date with influenza vaccine recently administered by local pharmacy.  Patient received influenza vaccine October 7.\par \par 7.  Forgetfulness reviewed the most recent work-up and note from neurology PET scan shows findings which are consistent with frontal temporal lobe dementia yet the patient is extremely high functioning she does have underlying anxiety and mild depression we will attempt Lexapro 10 mg for the first week to 10 days patient will be taken a half a tab daily discussed with patient and daughter possible side effects if she develops any we will stop the medications immediately.

## 2021-12-09 NOTE — HISTORY OF PRESENT ILLNESS
[Family Member] : family member [de-identified] : Patient is a 76-year-old female who presents today for annual wellness exam accompanied by her daughter Fatou. Status post PET CT of the brain which is consistent with frontotemporal dementia patient is being followed very closely by neurology. Presently the patient is awake alert and oriented times 3 but forgetful.

## 2021-12-10 LAB
24R-OH-CALCIDIOL SERPL-MCNC: 45.5 PG/ML
25(OH)D3 SERPL-MCNC: 30.1 NG/ML
ALBUMIN SERPL ELPH-MCNC: 4.4 G/DL
ALP BLD-CCNC: 127 U/L
ALT SERPL-CCNC: 21 U/L
ANION GAP SERPL CALC-SCNC: 11 MMOL/L
AST SERPL-CCNC: 17 U/L
BASOPHILS # BLD AUTO: 0.15 K/UL
BASOPHILS NFR BLD AUTO: 1.8 %
BILIRUB SERPL-MCNC: 1.6 MG/DL
BUN SERPL-MCNC: 17 MG/DL
CALCIUM SERPL-MCNC: 9.6 MG/DL
CHLORIDE SERPL-SCNC: 104 MMOL/L
CHOLEST SERPL-MCNC: 192 MG/DL
CO2 SERPL-SCNC: 26 MMOL/L
CREAT SERPL-MCNC: 0.98 MG/DL
EOSINOPHIL # BLD AUTO: 0.63 K/UL
EOSINOPHIL NFR BLD AUTO: 7.4 %
ESTIMATED AVERAGE GLUCOSE: 137 MG/DL
GLUCOSE SERPL-MCNC: 173 MG/DL
HBA1C MFR BLD HPLC: 6.4 %
HCT VFR BLD CALC: 48 %
HDLC SERPL-MCNC: 63 MG/DL
HGB BLD-MCNC: 15.5 G/DL
IMM GRANULOCYTES NFR BLD AUTO: 0.4 %
LDLC SERPL CALC-MCNC: 110 MG/DL
LYMPHOCYTES # BLD AUTO: 1.83 K/UL
LYMPHOCYTES NFR BLD AUTO: 21.4 %
MAN DIFF?: NORMAL
MCHC RBC-ENTMCNC: 27.6 PG
MCHC RBC-ENTMCNC: 32.3 GM/DL
MCV RBC AUTO: 85.6 FL
MONOCYTES # BLD AUTO: 0.61 K/UL
MONOCYTES NFR BLD AUTO: 7.1 %
NEUTROPHILS # BLD AUTO: 5.29 K/UL
NEUTROPHILS NFR BLD AUTO: 61.9 %
NONHDLC SERPL-MCNC: 129 MG/DL
PLATELET # BLD AUTO: 325 K/UL
POTASSIUM SERPL-SCNC: 4.4 MMOL/L
PROT SERPL-MCNC: 6.5 G/DL
RBC # BLD: 5.61 M/UL
RBC # FLD: 14.2 %
SODIUM SERPL-SCNC: 142 MMOL/L
TRIGL SERPL-MCNC: 96 MG/DL
WBC # FLD AUTO: 8.54 K/UL

## 2021-12-12 LAB
APPEARANCE: CLEAR
BACTERIA: NEGATIVE
BILIRUBIN URINE: NEGATIVE
BLOOD URINE: NEGATIVE
COLOR: YELLOW
CREAT SPEC-SCNC: 106 MG/DL
GLUCOSE QUALITATIVE U: NEGATIVE
HYALINE CASTS: 0 /LPF
KETONES URINE: NEGATIVE
LEUKOCYTE ESTERASE URINE: ABNORMAL
MICROALBUMIN 24H UR DL<=1MG/L-MCNC: <1.2 MG/DL
MICROALBUMIN/CREAT 24H UR-RTO: NORMAL MG/G
MICROSCOPIC-UA: NORMAL
NITRITE URINE: NEGATIVE
PH URINE: 7
PROTEIN URINE: NEGATIVE
RED BLOOD CELLS URINE: 1 /HPF
SPECIFIC GRAVITY URINE: 1.02
SQUAMOUS EPITHELIAL CELLS: 1 /HPF
UROBILINOGEN URINE: NORMAL
WHITE BLOOD CELLS URINE: 2 /HPF

## 2022-01-14 ENCOUNTER — NON-APPOINTMENT (OUTPATIENT)
Age: 77
End: 2022-01-14

## 2022-01-14 DIAGNOSIS — J06.9 ACUTE UPPER RESPIRATORY INFECTION, UNSPECIFIED: ICD-10-CM

## 2022-01-25 ENCOUNTER — OUTPATIENT (OUTPATIENT)
Dept: OUTPATIENT SERVICES | Facility: HOSPITAL | Age: 77
LOS: 1 days | End: 2022-01-25
Payer: MEDICARE

## 2022-01-25 ENCOUNTER — APPOINTMENT (OUTPATIENT)
Dept: ULTRASOUND IMAGING | Facility: HOSPITAL | Age: 77
End: 2022-01-25
Payer: MEDICARE

## 2022-01-25 DIAGNOSIS — R79.89 OTHER SPECIFIED ABNORMAL FINDINGS OF BLOOD CHEMISTRY: ICD-10-CM

## 2022-01-25 DIAGNOSIS — Z90.49 ACQUIRED ABSENCE OF OTHER SPECIFIED PARTS OF DIGESTIVE TRACT: Chronic | ICD-10-CM

## 2022-01-25 PROCEDURE — 76700 US EXAM ABDOM COMPLETE: CPT

## 2022-01-25 PROCEDURE — 76700 US EXAM ABDOM COMPLETE: CPT | Mod: 26

## 2022-02-04 ENCOUNTER — EMERGENCY (EMERGENCY)
Facility: HOSPITAL | Age: 77
LOS: 1 days | Discharge: HOME | End: 2022-02-04
Attending: EMERGENCY MEDICINE | Admitting: EMERGENCY MEDICINE
Payer: MEDICARE

## 2022-02-04 VITALS
TEMPERATURE: 98 F | SYSTOLIC BLOOD PRESSURE: 155 MMHG | HEIGHT: 65 IN | DIASTOLIC BLOOD PRESSURE: 89 MMHG | OXYGEN SATURATION: 99 % | WEIGHT: 175.05 LBS | HEART RATE: 80 BPM | RESPIRATION RATE: 18 BRPM

## 2022-02-04 DIAGNOSIS — Z90.49 ACQUIRED ABSENCE OF OTHER SPECIFIED PARTS OF DIGESTIVE TRACT: Chronic | ICD-10-CM

## 2022-02-04 LAB
ALBUMIN SERPL ELPH-MCNC: 3.3 G/DL — SIGNIFICANT CHANGE UP (ref 3.3–5)
ALP SERPL-CCNC: 135 U/L — HIGH (ref 40–120)
ALT FLD-CCNC: 27 U/L — SIGNIFICANT CHANGE UP (ref 10–45)
ANION GAP SERPL CALC-SCNC: 7 MMOL/L — SIGNIFICANT CHANGE UP (ref 5–17)
APTT BLD: 32.5 SEC — SIGNIFICANT CHANGE UP (ref 27.5–35.5)
AST SERPL-CCNC: 24 U/L — SIGNIFICANT CHANGE UP (ref 10–40)
BASOPHILS # BLD AUTO: 0.1 K/UL — SIGNIFICANT CHANGE UP (ref 0–0.2)
BASOPHILS NFR BLD AUTO: 1.1 % — SIGNIFICANT CHANGE UP (ref 0–2)
BILIRUB SERPL-MCNC: 1.6 MG/DL — HIGH (ref 0.2–1.2)
BUN SERPL-MCNC: 21 MG/DL — SIGNIFICANT CHANGE UP (ref 7–23)
CALCIUM SERPL-MCNC: 9 MG/DL — SIGNIFICANT CHANGE UP (ref 8.4–10.5)
CHLORIDE SERPL-SCNC: 106 MMOL/L — SIGNIFICANT CHANGE UP (ref 96–108)
CO2 SERPL-SCNC: 28 MMOL/L — SIGNIFICANT CHANGE UP (ref 22–31)
CREAT SERPL-MCNC: 1.28 MG/DL — SIGNIFICANT CHANGE UP (ref 0.5–1.3)
EOSINOPHIL # BLD AUTO: 0.12 K/UL — SIGNIFICANT CHANGE UP (ref 0–0.5)
EOSINOPHIL NFR BLD AUTO: 1.3 % — SIGNIFICANT CHANGE UP (ref 0–6)
FLU A RESULT: SIGNIFICANT CHANGE UP
FLU A RESULT: SIGNIFICANT CHANGE UP
FLUAV AG NPH QL: SIGNIFICANT CHANGE UP
FLUBV AG NPH QL: SIGNIFICANT CHANGE UP
GLUCOSE SERPL-MCNC: 175 MG/DL — HIGH (ref 70–99)
HCT VFR BLD CALC: 46.2 % — HIGH (ref 34.5–45)
HGB BLD-MCNC: 15 G/DL — SIGNIFICANT CHANGE UP (ref 11.5–15.5)
IMM GRANULOCYTES NFR BLD AUTO: 0.4 % — SIGNIFICANT CHANGE UP (ref 0–1.5)
INR BLD: 1.11 RATIO — SIGNIFICANT CHANGE UP (ref 0.88–1.16)
LYMPHOCYTES # BLD AUTO: 1.64 K/UL — SIGNIFICANT CHANGE UP (ref 1–3.3)
LYMPHOCYTES # BLD AUTO: 18 % — SIGNIFICANT CHANGE UP (ref 13–44)
MCHC RBC-ENTMCNC: 27.9 PG — SIGNIFICANT CHANGE UP (ref 27–34)
MCHC RBC-ENTMCNC: 32.5 GM/DL — SIGNIFICANT CHANGE UP (ref 32–36)
MCV RBC AUTO: 86 FL — SIGNIFICANT CHANGE UP (ref 80–100)
MONOCYTES # BLD AUTO: 0.73 K/UL — SIGNIFICANT CHANGE UP (ref 0–0.9)
MONOCYTES NFR BLD AUTO: 8 % — SIGNIFICANT CHANGE UP (ref 2–14)
NEUTROPHILS # BLD AUTO: 6.47 K/UL — SIGNIFICANT CHANGE UP (ref 1.8–7.4)
NEUTROPHILS NFR BLD AUTO: 71.2 % — SIGNIFICANT CHANGE UP (ref 43–77)
NRBC # BLD: 0 /100 WBCS — SIGNIFICANT CHANGE UP (ref 0–0)
PLATELET # BLD AUTO: 262 K/UL — SIGNIFICANT CHANGE UP (ref 150–400)
POTASSIUM SERPL-MCNC: 4.4 MMOL/L — SIGNIFICANT CHANGE UP (ref 3.5–5.3)
POTASSIUM SERPL-SCNC: 4.4 MMOL/L — SIGNIFICANT CHANGE UP (ref 3.5–5.3)
PROT SERPL-MCNC: 6.6 G/DL — SIGNIFICANT CHANGE UP (ref 6–8.3)
PROTHROM AB SERPL-ACNC: 13.4 SEC — SIGNIFICANT CHANGE UP (ref 10.6–13.6)
RBC # BLD: 5.37 M/UL — HIGH (ref 3.8–5.2)
RBC # FLD: 13.8 % — SIGNIFICANT CHANGE UP (ref 10.3–14.5)
RSV RESULT: SIGNIFICANT CHANGE UP
RSV RNA RESP QL NAA+PROBE: SIGNIFICANT CHANGE UP
SODIUM SERPL-SCNC: 141 MMOL/L — SIGNIFICANT CHANGE UP (ref 135–145)
WBC # BLD: 9.1 K/UL — SIGNIFICANT CHANGE UP (ref 3.8–10.5)
WBC # FLD AUTO: 9.1 K/UL — SIGNIFICANT CHANGE UP (ref 3.8–10.5)

## 2022-02-04 PROCEDURE — 71250 CT THORAX DX C-: CPT | Mod: MA

## 2022-02-04 PROCEDURE — 85610 PROTHROMBIN TIME: CPT

## 2022-02-04 PROCEDURE — 99284 EMERGENCY DEPT VISIT MOD MDM: CPT

## 2022-02-04 PROCEDURE — 71250 CT THORAX DX C-: CPT | Mod: 26,MA

## 2022-02-04 PROCEDURE — 74176 CT ABD & PELVIS W/O CONTRAST: CPT | Mod: 26,MA

## 2022-02-04 PROCEDURE — 85730 THROMBOPLASTIN TIME PARTIAL: CPT

## 2022-02-04 PROCEDURE — 80053 COMPREHEN METABOLIC PANEL: CPT

## 2022-02-04 PROCEDURE — 36415 COLL VENOUS BLD VENIPUNCTURE: CPT

## 2022-02-04 PROCEDURE — 85025 COMPLETE CBC W/AUTO DIFF WBC: CPT

## 2022-02-04 PROCEDURE — 73090 X-RAY EXAM OF FOREARM: CPT | Mod: 26,LT

## 2022-02-04 PROCEDURE — 70450 CT HEAD/BRAIN W/O DYE: CPT | Mod: 26,MA

## 2022-02-04 PROCEDURE — 74176 CT ABD & PELVIS W/O CONTRAST: CPT | Mod: MA

## 2022-02-04 PROCEDURE — 72125 CT NECK SPINE W/O DYE: CPT | Mod: 26,MA

## 2022-02-04 PROCEDURE — 87631 RESP VIRUS 3-5 TARGETS: CPT

## 2022-02-04 PROCEDURE — 70450 CT HEAD/BRAIN W/O DYE: CPT | Mod: MA

## 2022-02-04 PROCEDURE — 73090 X-RAY EXAM OF FOREARM: CPT

## 2022-02-04 PROCEDURE — 72125 CT NECK SPINE W/O DYE: CPT | Mod: MA

## 2022-02-04 PROCEDURE — 99285 EMERGENCY DEPT VISIT HI MDM: CPT | Mod: 25

## 2022-02-04 RX ORDER — ATORVASTATIN CALCIUM 80 MG/1
1 TABLET, FILM COATED ORAL
Qty: 0 | Refills: 0 | DISCHARGE

## 2022-02-04 RX ORDER — ASCORBIC ACID 60 MG
1 TABLET,CHEWABLE ORAL
Qty: 0 | Refills: 0 | DISCHARGE

## 2022-02-04 RX ORDER — IPRATROPIUM/ALBUTEROL SULFATE 18-103MCG
3 AEROSOL WITH ADAPTER (GRAM) INHALATION ONCE
Refills: 0 | Status: COMPLETED | OUTPATIENT
Start: 2022-02-04 | End: 2022-02-04

## 2022-02-04 RX ORDER — LIDOCAINE 4 G/100G
1 CREAM TOPICAL ONCE
Refills: 0 | Status: COMPLETED | OUTPATIENT
Start: 2022-02-04 | End: 2022-02-04

## 2022-02-04 RX ORDER — MULTIVIT-MIN/FERROUS GLUCONATE 9 MG/15 ML
1 LIQUID (ML) ORAL
Qty: 0 | Refills: 0 | DISCHARGE

## 2022-02-04 RX ORDER — ACETAMINOPHEN 500 MG
650 TABLET ORAL ONCE
Refills: 0 | Status: COMPLETED | OUTPATIENT
Start: 2022-02-04 | End: 2022-02-04

## 2022-02-04 RX ADMIN — Medication 650 MILLIGRAM(S): at 14:25

## 2022-02-04 RX ADMIN — Medication 650 MILLIGRAM(S): at 13:58

## 2022-02-04 RX ADMIN — LIDOCAINE 1 PATCH: 4 CREAM TOPICAL at 13:58

## 2022-02-04 NOTE — CHART NOTE - NSCHARTNOTEFT_GEN_A_CORE
SW met with patient at bedside to assess for social work needs and to complete home assessment of safety.  Patient is a 76 year old female presenting to ED due to fall.  Patients daughter at bedside.  SW introduced self and role of SW.  Patient reports she lives in private home.  Patient reports she feel down about 3-5 steps, but more slid down them.  Patient denies any other recent falls.  Fall prevention checklist discussed with patient. Patient denied any other safety concerns or need for SW assistance at this time. Patient reports she is independent and does not need further assistance. Denied needing additional resources for home.  Daughter assists as needed.  SW spoke with patient regarding recommendation to follow up with PMD, Dr Diop.  SW offered to schedule, patient and daughter in agreement.  SW called Dr Diop office and scheduled follow up appointment for 2/9/22 at 10:30 AM with NP Mandeep Loaiza.  SW provided daughter with appt card.  Daughter will provide transportation to appointments.  Daughter and patient made aware SW will call to follow up and assist as needed.

## 2022-02-04 NOTE — ED PROVIDER NOTE - PROGRESS NOTE DETAILS
AGUSTIN Morales: pt and her daughter informed of the elevated alk phos level, it was 127 in 12/2021, pt had an abd US with her PCP for this

## 2022-02-04 NOTE — ED ADULT NURSE NOTE - NSICDXPASTMEDICALHX_GEN_ALL_CORE_FT
PAST MEDICAL HISTORY:  Asthma well-controlled on medication, no recent hospitalizations    Cognitive dysfunction forgetfulness, takes donepezil    COPD with asthma     Diverticulitis of colon (without mention of hemorrhage) 2011    History of Hypertension     Hypercholesterolemia     Hypogammaglobulinemia no treatment currently, noted in chart 8/2012    Pneumonia  2011     Type 2 diabetes mellitus no current medications, Z1C 10/2020 = 6.9    Uterine polyp     Vertigo hospitalized,12/2019; underwent cardiac & neuro eval

## 2022-02-04 NOTE — ED PROVIDER NOTE - NSFOLLOWUPINSTRUCTIONS_ED_ALL_ED_FT
Fall Prevention for Older Adults    WHAT YOU NEED TO KNOW:    As you age, your muscles weaken and your risk for falls increases. Your risk also increases if you take medicines that make you sleepy or dizzy. You may also be at risk if you have vision or joint problems, have low blood pressure, or are not active.    DISCHARGE INSTRUCTIONS:    Call 911 or have someone else call if:   •You have fallen and are unconscious.      •You have fallen and cannot move part of your body.      Contact your healthcare provider if:   •You have fallen and have pain or a headache.      •You have questions or concerns about your condition or care.      Fall prevention tips:   •Stay active. Exercise can help strengthen your muscles and improve your balance. Your healthcare provider may recommend water aerobics, walking, or Marvin Chi. He or she may also recommend physical therapy to improve your coordination. Never start an exercise program without asking your healthcare provider first.  Water Aerobics for Seniors       Marvin Chi for Seniors           •Wear shoes that fit well and have soles that . Wear shoes both inside and outside. Use slippers with good . Avoid shoes with high heels.      •Use assistive devices as directed. Your healthcare provider may suggest that you use a cane or walker to help you keep your balance. You may need to have grab bars put in your bathroom near the toilet or in the shower.      •Stand or sit up slowly. This may help you keep your balance and prevent falls.      •Wear a personal alarm. This is a device that allows you to call 911 if you need help. Ask for more information on personal alarms.      •Manage your medical conditions.  Keep all appointments with your healthcare providers. Visit your eye doctor as directed.      Home safety tips:     Fall Prevention for Seniors     •Add items to prevent falls in the bathroom. Put nonslip strips on your bath or shower floor to prevent you from slipping. Use a bath mat if you do not have carpet in the bathroom. This will prevent you from falling when you step out of the bath or shower. Use a shower seat so you do not need to stand while you shower. Sit on the toilet or a chair in your bathroom to dry yourself and put on clothing. This will prevent you from losing your balance from drying or dressing yourself while you are standing.      •Keep paths clear. Remove books, shoes, and other objects from walkways and stairs. Place cords for telephones and lamps out of the way so that you do not need to walk over them. Tape them down if you cannot move them. Remove small rugs. If you cannot remove a rug, secure it with double-sided tape. This will prevent you from tripping.      •Install bright lights in your home. Use night lights to help light paths to the bathroom or kitchen. Always turn on the light before you start walking.      •Keep items you use often on shelves within reach. Do not use a step stool to help you reach an item.      •Paint or place reflective tape on the edges of your stairs. This will help you see the stairs better.      Follow up with your healthcare provider as directed: Write down your questions so you remember to ask them during your visits.  **    Contusion      A contusion is a deep bruise. Contusions are the result of a blunt injury to tissues and muscle fibers under the skin. The injury causes bleeding under the skin. The skin overlying the contusion may turn blue, purple, or yellow. Minor injuries will give you a painless contusion, but more severe injuries cause contusions that may stay painful and swollen for a few weeks.      Follow these instructions at home:    Pay attention to any changes in your symptoms. Let your health care provider know about them. Take these actions to relieve your pain.      Managing pain, stiffness, and swelling    •Use resting, icing, applying pressure (compression), and raising (elevating) the injured area. This is often called the RICE strategy.  •Rest the injured area. Return to your normal activities as told by your health care provider. Ask your health care provider what activities are safe for you.    •If directed, put ice on the injured area:  •Put ice in a plastic bag.      •Place a towel between your skin and the bag.      •Leave the ice on for 20 minutes, 2–3 times per day.        •If directed, apply light compression to the injured area using an elastic bandage. Make sure the bandage is not wrapped too tightly. Remove and reapply the bandage as directed by your health care provider.      •If possible, raise (elevate) the injured area above the level of your heart while you are sitting or lying down.        General instructions     •Take over-the-counter and prescription medicines only as told by your health care provider.      •Keep all follow-up visits as told by your health care provider. This is important.        Contact a health care provider if:    •Your symptoms do not improve after several days of treatment.      •Your symptoms get worse.      •You have difficulty moving the injured area.        Get help right away if:    •You have severe pain.      •You have numbness in a hand or foot.      •Your hand or foot turns pale or cold.        Summary    •A contusion is a deep bruise.      •Contusions are the result of a blunt injury to tissues and muscle fibers under the skin.      •It is treated with rest, ice, compression, and elevation. You may be given over-the-counter medicines for pain.      •Contact a health care provider if your symptoms do not improve, or get worse.       •Get help right away if you have severe pain, have numbness, or the area turns pale or cold.      This information is not intended to replace advice given to you by your health care provider. Make sure you discuss any questions you have with your health care provider.

## 2022-02-04 NOTE — ED ADULT NURSE NOTE - OBJECTIVE STATEMENT
pt presents to ED s/p mechanical fall on steps. pt did not fall down steps, but fell onto them. denies head strike. c/o pain to L flank, L lower back, L wrist. presents with ecchymosis to lower back, worse on L flank. + edema to L flank. TTP. pt able to ambulate with steady gait but endorses exacerbation of pain. full ROM present in all extremities. not on blood thinners. gcs 15. c-spine, t-spine not TTP, no step offs palpated. neck with full ROM, no pain. pt states she has urinated since fall and denies hematuria. skin intact.

## 2022-02-04 NOTE — ED PROVIDER NOTE - PATIENT PORTAL LINK FT
You can access the FollowMyHealth Patient Portal offered by [facility] by registering at the following website: [email or web address]. By joining [employer]’s FollowMyHealth portal, you will also be able to view your health information using other applications (apps) compatible with our system.

## 2022-02-04 NOTE — ED ADULT TRIAGE NOTE - CHIEF COMPLAINT QUOTE
patient complaining of lower back pain with bruising s/p slip on steps, denies head trauma and AC use. + bruising noted to the mid-back  ISAR negative

## 2022-02-04 NOTE — ED PROVIDER NOTE - CLINICAL SUMMARY MEDICAL DECISION MAKING FREE TEXT BOX
Dr. Mc: 76F h/o asthma, copd, HTN, HLD, DM, mild dementia had a mechanical fall down 3-5 steps at home, landed on her buttock last night, no head injury, no LOC. Able to ambulate. Has had a viral syndrome recently and has been using her albuterol and was on prednisone, no fevers, non productive cough x 3-4 weeks, no chest pain, no sob. Pt had tested neg for covid. On exam pt is well appearing, nad, obese, mild ecchymosis left volar mid forearm, no bony ttp, no spinal ttp, +ecchymosis in circular area of lumbar spinal area with diffuse lumbar ttp, abdo soft/nt/nd, pelvis stable, no pain with ROM of bilateral hips. Will image as indicated, albuterol, reassess.  On reassessment pt doing well, results explained, aga duffy.

## 2022-02-04 NOTE — ED PROVIDER NOTE - OBJECTIVE STATEMENT
77 y/o F with PMH of asthma/copd, HTN, HLD, DM, dementia presents with her daughter to the ED with c/o mechanical slip and fall down 3-4 steps inside the house last night. Pt reports she slide down the steps. She denies head injury or LOC. Reports lower back pain with bruising noticed today. Denies n/v, f/c, neck pain, CP, SOB, LE paresthesias, bowel/bladder incontinence or other injuries. Also reports chronic cough.

## 2022-02-04 NOTE — ED PROVIDER NOTE - PHYSICAL EXAMINATION
msk/skin: +midline TTP of the lumbar spine with a large area of ecchymosis, mostly on left flank. no c-spine TTP  no head injury noted.   +bruising to the left forearm, nontender  FROM of shoulders and hips b/l

## 2022-02-04 NOTE — ED PROVIDER NOTE - ATTENDING CONTRIBUTION TO CARE
Dr. Mc: I performed a face to face bedside interview with patient regarding history of present illness, review of symptoms and past medical history. I completed an independent physical exam.  I have discussed patient's plan of care with PA.   I agree with note as stated above, having amended the EMR as needed to reflect my findings.   This includes HISTORY OF PRESENT ILLNESS, HIV, PAST MEDICAL/SURGICAL/FAMILY/SOCIAL HISTORY, ALLERGIES AND HOME MEDICATIONS, REVIEW OF SYSTEMS, PHYSICAL EXAM, and any PROGRESS NOTES during the time I functioned as the attending physician for this patient.    see mdm

## 2022-02-08 NOTE — CHART NOTE - NSCHARTNOTEFT_GEN_A_CORE
SW called Pt at home to discuss and assist with follow-up care. Pt presenting to  ED on 2/4 following a fall. SW spoke with Pt who states she is feeling better. Pt confirmed she has a follow-up PCP appointment on 2/9 with Dr. Diop. SW offered continued assistance, Pt declined

## 2022-02-09 ENCOUNTER — APPOINTMENT (OUTPATIENT)
Dept: FAMILY MEDICINE | Facility: CLINIC | Age: 77
End: 2022-02-09
Payer: MEDICARE

## 2022-02-09 VITALS
DIASTOLIC BLOOD PRESSURE: 70 MMHG | HEIGHT: 67 IN | HEART RATE: 83 BPM | BODY MASS INDEX: 28.72 KG/M2 | TEMPERATURE: 97.1 F | WEIGHT: 183 LBS | OXYGEN SATURATION: 99 % | SYSTOLIC BLOOD PRESSURE: 130 MMHG | RESPIRATION RATE: 15 BRPM

## 2022-02-09 DIAGNOSIS — T14.8XXA OTHER INJURY OF UNSPECIFIED BODY REGION, INITIAL ENCOUNTER: ICD-10-CM
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
## 2022-02-09 PROCEDURE — 99215 OFFICE O/P EST HI 40 MIN: CPT

## 2022-02-09 RX ORDER — AZITHROMYCIN 250 MG/1
250 TABLET, FILM COATED ORAL
Qty: 1 | Refills: 1 | Status: COMPLETED | COMMUNITY
Start: 2022-01-14 | End: 2022-02-09

## 2022-02-10 ENCOUNTER — OUTPATIENT (OUTPATIENT)
Dept: OUTPATIENT SERVICES | Facility: HOSPITAL | Age: 77
LOS: 1 days | End: 2022-02-10
Payer: MEDICARE

## 2022-02-10 ENCOUNTER — APPOINTMENT (OUTPATIENT)
Dept: ULTRASOUND IMAGING | Facility: CLINIC | Age: 77
End: 2022-02-10
Payer: MEDICARE

## 2022-02-10 DIAGNOSIS — S30.0XXD CONTUSION OF LOWER BACK AND PELVIS, SUBSEQUENT ENCOUNTER: ICD-10-CM

## 2022-02-10 DIAGNOSIS — Z90.49 ACQUIRED ABSENCE OF OTHER SPECIFIED PARTS OF DIGESTIVE TRACT: Chronic | ICD-10-CM

## 2022-02-10 PROCEDURE — 76705 ECHO EXAM OF ABDOMEN: CPT | Mod: 26

## 2022-02-10 PROCEDURE — 76705 ECHO EXAM OF ABDOMEN: CPT

## 2022-02-10 NOTE — REVIEW OF SYSTEMS
[Cough] : cough [Joint Pain] : joint pain [Muscle Pain] : muscle pain [Negative] : Heme/Lymph [de-identified] : hematoma

## 2022-02-10 NOTE — PHYSICAL EXAM
[No Acute Distress] : no acute distress [Well Nourished] : well nourished [Well Developed] : well developed [Well-Appearing] : well-appearing [Normal Sclera/Conjunctiva] : normal sclera/conjunctiva [PERRL] : pupils equal round and reactive to light [EOMI] : extraocular movements intact [Normal Outer Ear/Nose] : the outer ears and nose were normal in appearance [Normal Oropharynx] : the oropharynx was normal [No JVD] : no jugular venous distention [No Lymphadenopathy] : no lymphadenopathy [Supple] : supple [Thyroid Normal, No Nodules] : the thyroid was normal and there were no nodules present [No Respiratory Distress] : no respiratory distress  [No Accessory Muscle Use] : no accessory muscle use [Clear to Auscultation] : lungs were clear to auscultation bilaterally [Normal Rate] : normal rate  [Regular Rhythm] : with a regular rhythm [Normal S1, S2] : normal S1 and S2 [No Murmur] : no murmur heard [No Carotid Bruits] : no carotid bruits [No Abdominal Bruit] : a ~M bruit was not heard ~T in the abdomen [No Varicosities] : no varicosities [Pedal Pulses Present] : the pedal pulses are present [No Edema] : there was no peripheral edema [No Palpable Aorta] : no palpable aorta [No Extremity Clubbing/Cyanosis] : no extremity clubbing/cyanosis [Soft] : abdomen soft [Non Tender] : non-tender [Non-distended] : non-distended [No Masses] : no abdominal mass palpated [No HSM] : no HSM [Normal Bowel Sounds] : normal bowel sounds [Normal Posterior Cervical Nodes] : no posterior cervical lymphadenopathy [Normal Anterior Cervical Nodes] : no anterior cervical lymphadenopathy [No CVA Tenderness] : no CVA  tenderness [No Spinal Tenderness] : no spinal tenderness [No Joint Swelling] : no joint swelling [Grossly Normal Strength/Tone] : grossly normal strength/tone [No Rash] : no rash [Coordination Grossly Intact] : coordination grossly intact [No Focal Deficits] : no focal deficits [Normal Gait] : normal gait [Deep Tendon Reflexes (DTR)] : deep tendon reflexes were 2+ and symmetric [Normal Affect] : the affect was normal [Normal Insight/Judgement] : insight and judgment were intact [de-identified] : Large soft non tender mass over lower lumbar spine with peripheral ecchymosis - midly tender to touch

## 2022-02-11 ENCOUNTER — APPOINTMENT (OUTPATIENT)
Dept: ORTHOPEDIC SURGERY | Facility: CLINIC | Age: 77
End: 2022-02-11
Payer: MEDICARE

## 2022-02-11 VITALS — HEIGHT: 65 IN

## 2022-02-11 DIAGNOSIS — S30.0XXA CONTUSION OF LOWER BACK AND PELVIS, INITIAL ENCOUNTER: ICD-10-CM

## 2022-02-11 PROCEDURE — 10160 PNXR ASPIR ABSC HMTMA BULLA: CPT

## 2022-02-11 PROCEDURE — 99203 OFFICE O/P NEW LOW 30 MIN: CPT | Mod: 25

## 2022-02-11 NOTE — PHYSICAL EXAM
[de-identified] : Lumbosacral Spine:\par Musculoskeletal Examination\par ¦ Inspection : no visible rash, diffuse ecchymosis and large hematoma midline lumbar region, with multiple small bullae. no signs of infection, erythema, active discharge. \par ¦ Palpation : diffuse paraspinal musculature tenderness\par ¦ Stability : no subluxations present\par ¦ Range of Motion : full and painful arc of motion in all planes\par ¦ Muscle Strength : paraspinal muscle strength and tone within normal limits\par ¦ Muscle Tone : paraspinal muscle strength and tone within normal limits\par ¦ Tests/Signs : Straight Leg Raise Test (-) bilaterally. Patellar and Achilles Reflexes (2+) bilaterally. [de-identified] : Patient comes to today's visit with outside imaging already performed. I reviewed the images in detail with the patient and discussed the findings as highlighted below.\par \par \par o CT of the abdomen and pelvis performed on 2/3/2022: \par ¦ no acute fracture/dislocation lumbar spine.\par ¦ diffuse degenerative changes.

## 2022-02-11 NOTE — PROCEDURE
[de-identified] :  At this point I recommended a therapeutic injection and under sterile and under ultrasound guidance precautions an injection of 2 cc was placed into the skin overlying the lumbar region and 200 cc of blood was aspirated from a large hypoechoic collection. After several minutes, the patient felt significant relief.\par

## 2022-02-11 NOTE — HISTORY OF PRESENT ILLNESS
[de-identified] : Ms. LOMBARDO  is a 76 year female  presenting to the office complaining of low back pain. She  presents to the office ambulating independently. Patient reports pain began on 2/3/2022. She notes she slipped on the steps and injured her low back at this time. She went to [place] ED where she had a CT of her abdomen/pelvis negative for acute fracture/dislocation.  The patient describes the pain as a dull aching, and occasionally sharp pain localized to the lumbar region that is intermittent in nature.  Her symptoms are exacerbated any movement of the torso.  Pain is alleviated with rest. Patient  denies radicular symptoms.  Patient denies new weakness, numbness or paresthesia.  Patient denies bowel/bladder dysfunction, fevers, chills, weight loss, night pain, or night sweats. Patient is taking NSAIDs for pain relief with mild to moderate relief in symptoms. \par

## 2022-02-11 NOTE — DISCUSSION/SUMMARY
[de-identified] : The underlying pathophysiology was reviewed in great detail with the patient as well as the various treatment options, including ice, analgesics, NSAIDs, Physical therapy, steroid injections.\par \par Patient received an aspiration of the lumbar hematoma today. \par \par A compressive bandage was applied. \par \par Activity modifications and restrictions were discussed.\par \par FU prn\par \par All questions were answered, all alternatives discussed and the patient is in complete agreement with that plan. Follow-up appointment as instructed. Any issues and the patient will call or come in sooner.

## 2022-03-31 ENCOUNTER — APPOINTMENT (OUTPATIENT)
Dept: FAMILY MEDICINE | Facility: CLINIC | Age: 77
End: 2022-03-31
Payer: MEDICARE

## 2022-03-31 VITALS
HEIGHT: 65 IN | OXYGEN SATURATION: 96 % | RESPIRATION RATE: 15 BRPM | DIASTOLIC BLOOD PRESSURE: 83 MMHG | BODY MASS INDEX: 30.16 KG/M2 | SYSTOLIC BLOOD PRESSURE: 130 MMHG | TEMPERATURE: 97.3 F | WEIGHT: 181 LBS | HEART RATE: 92 BPM

## 2022-03-31 DIAGNOSIS — F41.9 ANXIETY DISORDER, UNSPECIFIED: ICD-10-CM

## 2022-03-31 DIAGNOSIS — M85.80 OTHER SPECIFIED DISORDERS OF BONE DENSITY AND STRUCTURE, UNSPECIFIED SITE: ICD-10-CM

## 2022-03-31 PROCEDURE — 36415 COLL VENOUS BLD VENIPUNCTURE: CPT

## 2022-03-31 PROCEDURE — 99214 OFFICE O/P EST MOD 30 MIN: CPT | Mod: 25

## 2022-03-31 RX ORDER — ESCITALOPRAM OXALATE 10 MG/1
10 TABLET ORAL DAILY
Qty: 90 | Refills: 1 | Status: DISCONTINUED | COMMUNITY
Start: 2021-05-21 | End: 2022-03-31

## 2022-03-31 NOTE — HEALTH RISK ASSESSMENT
[Never] : Never [No] : In the past 12 months have you used drugs other than those required for medical reasons? No [Any fall with injury in past year] : Patient reported fall with injury in the past year [Medical reason not done] : Medical reason not done [Audit-CScore] : 0 [de-identified] : Dementia [With Patient/Caregiver] : , with patient/caregiver [Reviewed no changes] : Reviewed, no changes [Designated Healthcare Proxy] : Designated healthcare proxy [Relationship: ___] : Relationship: [unfilled] [Aggressive treatment] : aggressive treatment [I will adhere to the patient's wishes.] : I will adhere to the patient's wishes. [AdvancecareDate] : 03/22

## 2022-03-31 NOTE — PHYSICAL EXAM
[No Acute Distress] : no acute distress [Well Nourished] : well nourished [Well Developed] : well developed [Well-Appearing] : well-appearing [Normal Voice/Communication] : normal voice/communication [Normal Sclera/Conjunctiva] : normal sclera/conjunctiva [PERRL] : pupils equal round and reactive to light [EOMI] : extraocular movements intact [Normal Outer Ear/Nose] : the outer ears and nose were normal in appearance [Normal Oropharynx] : the oropharynx was normal [Normal TMs] : both tympanic membranes were normal [Normal Nasal Mucosa] : the nasal mucosa was normal [No JVD] : no jugular venous distention [No Lymphadenopathy] : no lymphadenopathy [Supple] : supple [Thyroid Normal, No Nodules] : the thyroid was normal and there were no nodules present [No Respiratory Distress] : no respiratory distress  [No Accessory Muscle Use] : no accessory muscle use [Clear to Auscultation] : lungs were clear to auscultation bilaterally [Normal Rate] : normal rate  [Regular Rhythm] : with a regular rhythm [Normal S1, S2] : normal S1 and S2 [No Murmur] : no murmur heard [No Carotid Bruits] : no carotid bruits [No Abdominal Bruit] : a ~M bruit was not heard ~T in the abdomen [No Varicosities] : no varicosities [Pedal Pulses Present] : the pedal pulses are present [No Edema] : there was no peripheral edema [No Palpable Aorta] : no palpable aorta [No Extremity Clubbing/Cyanosis] : no extremity clubbing/cyanosis [Soft] : abdomen soft [Non Tender] : non-tender [Non-distended] : non-distended [No Masses] : no abdominal mass palpated [No HSM] : no HSM [Normal Bowel Sounds] : normal bowel sounds [No Hernias] : no hernias [Normal Supraclavicular Nodes] : no supraclavicular lymphadenopathy [Normal Posterior Cervical Nodes] : no posterior cervical lymphadenopathy [Normal Anterior Cervical Nodes] : no anterior cervical lymphadenopathy [No CVA Tenderness] : no CVA  tenderness [No Joint Swelling] : no joint swelling [No Spinal Tenderness] : no spinal tenderness [Grossly Normal Strength/Tone] : grossly normal strength/tone [No Rash] : no rash [No Skin Lesions] : no skin lesions [Coordination Grossly Intact] : coordination grossly intact [No Focal Deficits] : no focal deficits [Normal Gait] : normal gait [Deep Tendon Reflexes (DTR)] : deep tendon reflexes were 2+ and symmetric [Speech Grossly Normal] : speech grossly normal [Normal Affect] : the affect was normal [Alert and Oriented x3] : oriented to person, place, and time [Normal Mood] : the mood was normal [Normal Insight/Judgement] : insight and judgment were intact [de-identified] : gyn [FreeTextEntry1] : GI [de-identified] : GYN [de-identified] : Dystrophic nails that his fingernails secondary to chronic trauma [de-identified] : forgetful

## 2022-03-31 NOTE — COUNSELING
[Fall prevention counseling provided] : Fall prevention counseling provided [Adequate lighting] : Adequate lighting [No throw rugs] : No throw rugs [Use proper foot wear] : Use proper foot wear [Behavioral health counseling provided] : Behavioral health counseling provided [Sleep ___ hours/day] : Sleep [unfilled] hours/day [Engage in a relaxing activity] : Engage in a relaxing activity [Plan in advance] : Plan in advance [AUDIT-C Screening administered and reviewed] : AUDIT-C Screening administered and reviewed [Limited decision making ability] : Limited decision making ability [Needs reinforcement, provided] : Patient needs reinforcement on understanding of lifestyle changes and steps needed to achieve self management goal; reinforcement was provided

## 2022-03-31 NOTE — HISTORY OF PRESENT ILLNESS
[Family Member] : family member [FreeTextEntry1] : See HPI. [de-identified] : Patient is a 77-year-old female who presents today for follow-up and disease management accompanied by her daughter.  Patient's most recent medical history was seen by orthopedics status post fall at home patient developed hematoma of lower back subsequently it was large enough to be drained and it was patient doing much better at this time.\par \par Medical history is significant for osteopenia, hypertension, COPD, advancing forgetfulness patient does have underlying dementia unfortunately at this time a definition of what exactly dementia is is presently being worked up by neurology, generalized anxiety which at times can be quite incapacitating and hyperlipidemia.

## 2022-03-31 NOTE — ASSESSMENT
[FreeTextEntry1] : Assessment and plan:\par \par 1.  Status post fall at home patient was evaluated by orthopedics status post I&D of large hematoma back presently healing.\par \par 2.  Comprehensive blood work drawn in office which will include CBC with differential, comprehensive metabolic, lipid profile, hemoglobin A1c,  thyroid function tests and urine analysis.\par \par 3.  Electrocardiogram done in office shows no acute ST-T wave changes stable when compared with previous\par \par 4.  Comprehensive pulmonology exam was done by pulmonology full pulmonary function test was done stable for patient.\par \par 5.  Comprehensive cardiology exam done by cardiology patient given clean bill of health last year.\par \par 6.  Patient is up-to-date with influenza vaccine administered by local pharmacy.  Patient received influenza vaccine October 7.\par \par 7.  Forgetfulness reviewed the most recent work-up and note from neurology PET scan shows findings which are consistent with frontal temporal lobe dementia yet the patient is extremely high functioning she does have underlying anxiety and mild depression we will attempt Lexapro 10 mg for the first week to 10 days patient will be taken a half a tab daily discussed with patient and daughter possible side effects if she develops any we will stop the medications immediately.  Patient has been evaluated by neurology and also has had a second opinion I do not have the report from the second opinion.

## 2022-04-01 LAB
ALBUMIN SERPL ELPH-MCNC: 4.3 G/DL
ALP BLD-CCNC: 125 U/L
ALT SERPL-CCNC: 14 U/L
ANION GAP SERPL CALC-SCNC: 14 MMOL/L
AST SERPL-CCNC: 16 U/L
BASOPHILS # BLD AUTO: 0.11 K/UL
BASOPHILS NFR BLD AUTO: 1.4 %
BILIRUB SERPL-MCNC: 1.5 MG/DL
BUN SERPL-MCNC: 15 MG/DL
CALCIUM SERPL-MCNC: 9.6 MG/DL
CHLORIDE SERPL-SCNC: 107 MMOL/L
CHOLEST SERPL-MCNC: 203 MG/DL
CO2 SERPL-SCNC: 20 MMOL/L
CREAT SERPL-MCNC: 0.92 MG/DL
EGFR: 65 ML/MIN/1.73M2
EOSINOPHIL # BLD AUTO: 0.43 K/UL
EOSINOPHIL NFR BLD AUTO: 5.5 %
ESTIMATED AVERAGE GLUCOSE: 166 MG/DL
FOLATE SERPL-MCNC: 15.8 NG/ML
GLUCOSE SERPL-MCNC: 189 MG/DL
HBA1C MFR BLD HPLC: 7.4 %
HCT VFR BLD CALC: 48.8 %
HDLC SERPL-MCNC: 65 MG/DL
HGB BLD-MCNC: 15.7 G/DL
IMM GRANULOCYTES NFR BLD AUTO: 0.4 %
LDLC SERPL CALC-MCNC: 118 MG/DL
LYMPHOCYTES # BLD AUTO: 2 K/UL
LYMPHOCYTES NFR BLD AUTO: 25.4 %
MAN DIFF?: NORMAL
MCHC RBC-ENTMCNC: 27.4 PG
MCHC RBC-ENTMCNC: 32.2 GM/DL
MCV RBC AUTO: 85.2 FL
MONOCYTES # BLD AUTO: 0.64 K/UL
MONOCYTES NFR BLD AUTO: 8.1 %
NEUTROPHILS # BLD AUTO: 4.65 K/UL
NEUTROPHILS NFR BLD AUTO: 59.2 %
NONHDLC SERPL-MCNC: 138 MG/DL
PLATELET # BLD AUTO: 340 K/UL
POTASSIUM SERPL-SCNC: 4.3 MMOL/L
PROT SERPL-MCNC: 6.1 G/DL
RBC # BLD: 5.73 M/UL
RBC # FLD: 14.6 %
SODIUM SERPL-SCNC: 141 MMOL/L
TRIGL SERPL-MCNC: 103 MG/DL
TSH SERPL-ACNC: 2.15 UIU/ML
VIT B12 SERPL-MCNC: 899 PG/ML
WBC # FLD AUTO: 7.86 K/UL

## 2022-04-13 ENCOUNTER — APPOINTMENT (OUTPATIENT)
Dept: NEUROLOGY | Facility: CLINIC | Age: 77
End: 2022-04-13
Payer: MEDICARE

## 2022-04-13 VITALS
SYSTOLIC BLOOD PRESSURE: 138 MMHG | DIASTOLIC BLOOD PRESSURE: 85 MMHG | HEART RATE: 85 BPM | BODY MASS INDEX: 29.99 KG/M2 | WEIGHT: 180 LBS | HEIGHT: 65 IN

## 2022-04-13 PROCEDURE — 99214 OFFICE O/P EST MOD 30 MIN: CPT

## 2022-04-13 NOTE — HISTORY OF PRESENT ILLNESS
[FreeTextEntry1] : Mrs. Patrica Mane returned to the office having been last seen on October 7, 2021. She is a 77-year-old right-handed patient with mild cognitive impairment and a family history of dementia.  She was accompanied to the office by her daughter Fatou ([phone]).\par \par Since last seen, Mrs. Otto has become less engaging.  She is no longer cooking or doing laundry.  She has poor organizational skills.  She is sometimes agitated.  She has very poor short-term memory.  He continues to exhibit compulsive behaviors of biting her fingernails to the base.  She is not hallucinating.  She sleeps well.  She remains on donepezil 10 mg/day.\par \par FDG PET scan revealed abnormal hypometabolism thickly pronounced in the anterior temporal lobes bilaterally and also mild hypometabolism in the frontal regions consistent with an early neurodegenerative disorder a pattern suggestive of frontotemporal dementia.  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

## 2022-04-13 NOTE — PHYSICAL EXAM
[FreeTextEntry1] : Constitutional:  Patient was well-developed, well-nourished and in no acute distress. \par \par Head:  Normocephalic, atraumatic. Tympanic membranes were clear. \par \par Neck:  Supple with full range of motion. \par \par Cardiovascular:  Cardiac rhythm was regular without murmur. There were no carotid bruits. Peripheral pulses were full and symmetric. \par \par Respiratory:  Lungs were clear. \par \par Abdomen:  Soft and nontender. \par \par Spine:  Nontender. \par \par Skin:  There were no rashes. \par \par NEUROLOGICAL EXAMINATION:\par \par Mental Status: She was alert and oriented. Speech was fluent. There was no dysarthria.  Gait scored 23 out of 30 on MMSE, a two-point decline from her last visit.  She made 4 errors in orientation and had no short-term memory.\par \par Cranial Nerves: \par \par II: She could finger count bilaterally. Pupils were equal and reactive. Visual fields were full.  Fundi were not visualized.\par \par III, IV, VI:  Eye movements were full without nystagmus. \par \par V: Facial sensation was intact. \par \par VII: Facial strength was normal. \par \par VIII: Hearing was equal. \par \par IX, X: Palatal movement was normal. Phonation was normal. \par \par XI: Sternocleidomastoids and trapezii were normal. \par \par XII: Tongue was midline and movements normal. There was no lingual atrophy or fasciculations. \par \par Motor Examination: Muscle bulk, tone and strength were normal. \par \par Sensory Examination: Pinprick, vibration and joint position sense were intact. \par \par Reflexes: DTRs were 2 the biceps and absent elsewhere.\par \par Plantar Responses: Plantar responses were flexor. \par \par Coordination/Cerebellar Function: There was no dysmetria on finger to nose or heel to shin testing. \par \par Gait/Stance: Gait was stable.  Tandem was mildly unsteady. Romberg was negative.\par

## 2022-04-13 NOTE — REVIEW OF SYSTEMS
[Memory Lapses or Loss] : memory loss [Anxiety] : anxiety [Negative] : Heme/Lymph [de-identified] : Compulsive behaviors

## 2022-04-13 NOTE — ASSESSMENT
[FreeTextEntry1] : Mrs. Otto is a 77-year-old with subacute dementia and a family history.  She is becoming less engaged in activities of daily living.  She has exhibited mild agitation.  FDG PET scan was consistent with frontotemporal dementia.  Fatou, her daughter requested genetic testing for the MAPT gene.  I explained the implications of genetic testing for dementia, not only for the patient but the implications for family members.  Fatou will decide whether she wishes to continue or discontinue donepezil.  She will be reevaluated in 6 months.  Telephone contact will be maintained in the meantime.

## 2022-04-28 ENCOUNTER — EMERGENCY (EMERGENCY)
Facility: HOSPITAL | Age: 77
LOS: 1 days | Discharge: HOME | End: 2022-04-28
Attending: EMERGENCY MEDICINE | Admitting: EMERGENCY MEDICINE
Payer: MEDICARE

## 2022-04-28 VITALS
SYSTOLIC BLOOD PRESSURE: 107 MMHG | WEIGHT: 179.9 LBS | DIASTOLIC BLOOD PRESSURE: 68 MMHG | HEIGHT: 65 IN | OXYGEN SATURATION: 99 % | TEMPERATURE: 99 F | RESPIRATION RATE: 21 BRPM | HEART RATE: 68 BPM

## 2022-04-28 VITALS — HEART RATE: 81 BPM | TEMPERATURE: 98 F | RESPIRATION RATE: 17 BRPM | OXYGEN SATURATION: 98 %

## 2022-04-28 DIAGNOSIS — Z90.49 ACQUIRED ABSENCE OF OTHER SPECIFIED PARTS OF DIGESTIVE TRACT: Chronic | ICD-10-CM

## 2022-04-28 LAB
ALBUMIN SERPL ELPH-MCNC: 3.1 G/DL — LOW (ref 3.3–5)
ALP SERPL-CCNC: 106 U/L — SIGNIFICANT CHANGE UP (ref 40–120)
ALT FLD-CCNC: 31 U/L — SIGNIFICANT CHANGE UP (ref 10–45)
ANION GAP SERPL CALC-SCNC: 7 MMOL/L — SIGNIFICANT CHANGE UP (ref 5–17)
AST SERPL-CCNC: 22 U/L — SIGNIFICANT CHANGE UP (ref 10–40)
BASOPHILS # BLD AUTO: 0.11 K/UL — SIGNIFICANT CHANGE UP (ref 0–0.2)
BASOPHILS NFR BLD AUTO: 0.9 % — SIGNIFICANT CHANGE UP (ref 0–2)
BILIRUB SERPL-MCNC: 1.9 MG/DL — HIGH (ref 0.2–1.2)
BUN SERPL-MCNC: 22 MG/DL — SIGNIFICANT CHANGE UP (ref 7–23)
CALCIUM SERPL-MCNC: 8.7 MG/DL — SIGNIFICANT CHANGE UP (ref 8.4–10.5)
CHLORIDE SERPL-SCNC: 101 MMOL/L — SIGNIFICANT CHANGE UP (ref 96–108)
CO2 SERPL-SCNC: 31 MMOL/L — SIGNIFICANT CHANGE UP (ref 22–31)
CREAT SERPL-MCNC: 1.22 MG/DL — SIGNIFICANT CHANGE UP (ref 0.5–1.3)
EGFR: 46 ML/MIN/1.73M2 — LOW
EOSINOPHIL # BLD AUTO: 0.17 K/UL — SIGNIFICANT CHANGE UP (ref 0–0.5)
EOSINOPHIL NFR BLD AUTO: 1.5 % — SIGNIFICANT CHANGE UP (ref 0–6)
GLUCOSE BLDC GLUCOMTR-MCNC: 228 MG/DL — HIGH (ref 70–99)
GLUCOSE SERPL-MCNC: 288 MG/DL — HIGH (ref 70–99)
HCT VFR BLD CALC: 43.2 % — SIGNIFICANT CHANGE UP (ref 34.5–45)
HGB BLD-MCNC: 14.4 G/DL — SIGNIFICANT CHANGE UP (ref 11.5–15.5)
IMM GRANULOCYTES NFR BLD AUTO: 0.6 % — SIGNIFICANT CHANGE UP (ref 0–1.5)
LYMPHOCYTES # BLD AUTO: 1.38 K/UL — SIGNIFICANT CHANGE UP (ref 1–3.3)
LYMPHOCYTES # BLD AUTO: 11.9 % — LOW (ref 13–44)
MCHC RBC-ENTMCNC: 28.3 PG — SIGNIFICANT CHANGE UP (ref 27–34)
MCHC RBC-ENTMCNC: 33.3 GM/DL — SIGNIFICANT CHANGE UP (ref 32–36)
MCV RBC AUTO: 85 FL — SIGNIFICANT CHANGE UP (ref 80–100)
MONOCYTES # BLD AUTO: 0.84 K/UL — SIGNIFICANT CHANGE UP (ref 0–0.9)
MONOCYTES NFR BLD AUTO: 7.2 % — SIGNIFICANT CHANGE UP (ref 2–14)
NEUTROPHILS # BLD AUTO: 9.06 K/UL — HIGH (ref 1.8–7.4)
NEUTROPHILS NFR BLD AUTO: 77.9 % — HIGH (ref 43–77)
NRBC # BLD: 0 /100 WBCS — SIGNIFICANT CHANGE UP (ref 0–0)
PLATELET # BLD AUTO: 266 K/UL — SIGNIFICANT CHANGE UP (ref 150–400)
POTASSIUM SERPL-MCNC: 3.4 MMOL/L — LOW (ref 3.5–5.3)
POTASSIUM SERPL-SCNC: 3.4 MMOL/L — LOW (ref 3.5–5.3)
PROT SERPL-MCNC: 5.9 G/DL — LOW (ref 6–8.3)
RBC # BLD: 5.08 M/UL — SIGNIFICANT CHANGE UP (ref 3.8–5.2)
RBC # FLD: 13.8 % — SIGNIFICANT CHANGE UP (ref 10.3–14.5)
SODIUM SERPL-SCNC: 139 MMOL/L — SIGNIFICANT CHANGE UP (ref 135–145)
TROPONIN I, HIGH SENSITIVITY RESULT: 8.1 NG/L — SIGNIFICANT CHANGE UP
TROPONIN I, HIGH SENSITIVITY RESULT: 8.8 NG/L — SIGNIFICANT CHANGE UP
WBC # BLD: 11.63 K/UL — HIGH (ref 3.8–10.5)
WBC # FLD AUTO: 11.63 K/UL — HIGH (ref 3.8–10.5)

## 2022-04-28 PROCEDURE — 84484 ASSAY OF TROPONIN QUANT: CPT

## 2022-04-28 PROCEDURE — 36415 COLL VENOUS BLD VENIPUNCTURE: CPT

## 2022-04-28 PROCEDURE — 82962 GLUCOSE BLOOD TEST: CPT

## 2022-04-28 PROCEDURE — 85025 COMPLETE CBC W/AUTO DIFF WBC: CPT

## 2022-04-28 PROCEDURE — 99283 EMERGENCY DEPT VISIT LOW MDM: CPT

## 2022-04-28 PROCEDURE — 93010 ELECTROCARDIOGRAM REPORT: CPT

## 2022-04-28 PROCEDURE — 80053 COMPREHEN METABOLIC PANEL: CPT

## 2022-04-28 PROCEDURE — 99285 EMERGENCY DEPT VISIT HI MDM: CPT | Mod: FS

## 2022-04-28 PROCEDURE — 93005 ELECTROCARDIOGRAM TRACING: CPT

## 2022-04-28 RX ORDER — SODIUM CHLORIDE 9 MG/ML
500 INJECTION INTRAMUSCULAR; INTRAVENOUS; SUBCUTANEOUS ONCE
Refills: 0 | Status: COMPLETED | OUTPATIENT
Start: 2022-04-28 | End: 2022-04-28

## 2022-04-28 RX ADMIN — SODIUM CHLORIDE 1000 MILLILITER(S): 9 INJECTION INTRAMUSCULAR; INTRAVENOUS; SUBCUTANEOUS at 01:29

## 2022-04-28 NOTE — ED ADULT TRIAGE NOTE - SPO2 (%)
99 Render Risk Assessment In Note?: no Additional Notes: Encouraged patient to see PCP or her Cardiologist because of the swelling in lower legs, and pain that started after heart cath. Informed patient that if swelling or pain worsen, to seek medical help and go to the ER. Detail Level: Simple

## 2022-04-28 NOTE — ED PROVIDER NOTE - CLINICAL SUMMARY MEDICAL DECISION MAKING FREE TEXT BOX
pt with h/o dementia p/w near syncopal episode and low BP as per ems , pt was preparing for coloscopy and had multiple bowel movent, ekg was normal and he r CE X 2 were normal, she was hydrated and orthostatic were check, she was able to walk to bathroom on her own without any symptoms, pt was advised to stay hydrated and f/u PMD

## 2022-04-28 NOTE — ED PROVIDER NOTE - PATIENT PORTAL LINK FT
You can access the FollowMyHealth Patient Portal offered by [facility] by registering at the following website: [email or web address]. By joining [employer]’s FollowMyHealth portal, you will also be able to view your health information using other applications (apps) compatible with our system.

## 2022-04-28 NOTE — ED ADULT TRIAGE NOTE - CHIEF COMPLAINT QUOTE
pt was preping for colonoscopy and past out 2 times  her daughter states she had to catch her from falling onto floor ems states that b/p on arrival was 80/50

## 2022-04-28 NOTE — ED ADULT NURSE NOTE - CAS DISCH TRANSFER METHOD
Home Care Instructions for Post-Operative Patients    Tonsillectomy / Throat Surgery    Post-operative pain  Throat discomfort may persist for 7-14 days, typically peaking from days 3-7.    It is common to have discomfort in the ears.  The ear pain is actually from the throat and should be treated with pain medication.    Aspirin or anti-inflammatory medication (such as Motrin, Advil, Pamprin, ibuprofen, etc.) should NOT be given.  These medications may increase the chance of bleeding to occur.    Your physician will order specific pain medication.  Please follow dosage instructions on the bottle.  It is recommended to give pain medication every four hours (around the clock) for the first several days.  We have found Tylenol (acetaminophen) to be a safe and effective reliever of mild to moderate pain.    Narcotic pain medication can cause constipation and nausea.  Take with solid food to avoid nausea.    Over-the-counter Chloraseptic spray prior to meals may relieve some pain when swallowing.    It is very important to relieve throat and ear discomfort, so that liquids can be taken in order to maintain adequate hydration.    Activity  Activity such as running, screaming, bike riding, contact sports and aerobic exercise should be avoided for up to two weeks.  Engage in quiet activities such as reading, homework, coloring, video games, card games and board games during this period of time.    You may return to school/work in 5 - 10 days, as directed by your physician.    It is recommended that activities (including eating) should be done about one half hour after pain medication has been given.  Use caution while on narcotic pain medication.    Oral hygiene should be done at least once daily.  Teeth can be gently brushed.  Avoid gargling.    No driving for 24 hours and while on narcotic pain medication.    Avoid greasy/spicy foods    No straws    Avoid red foods    Evelevate head of bed 30-45 degrees for  24h    Light activity    No bending    No nose blowing    Soft voice/No whisper    DO NOT SMOKE!    Care After Anesthesia or Sedation    After discharge  • Due to the medicine given, someone must drive you home. It is strongly recommended to have someone stay with you at home the day of discharge and the night after surgery.  • If you have infants or small children at home, please have someone help you for at least 24 hours after your surgery.  • Do not drive for at least 24 hours after surgery (or as told by your doctor).  • Rest for the remainder of the day. Go up and down stairs slowly.  • Do not smoke after surgery. Smoking can delay healing.  • Do not operate heavy or potential harmful equipment.  • Do not make legally binding decisions.  • Do not drink alcohol for 24 hours.    Diet  • Nausea may be expected for the first 24 to 48 hours. Start eating a bland diet (toast, gelatin, 7-up, hot cereal, crackers, sherbet, broth soup).  • Drink plenty of fluids (6 to 8 glasses of water a day).  • Resume your regular diet as able.  • Avoid greasy or spicy foods for 24 hours.    Urination  • The effects of anesthetics may cause some people to have trouble passing urine the day of surgery. Drink a lot of fluids to help prevent this.  • Try to urinate within 8 hours of surgery.  • If you are unable to pass urine and feel like you need to, call your doctor or the hospital.    Pain control  • Some incisions are injected with a long acting local anesthetic; it will wear off in 4 to 6 hours. You can expect to have some pain at this time.  • Treat your pain with the prescribed pain medicine before it wears off. Do not wait until your pain becomes severe.  • Ask your nurse when you had your last pain medicine, so you know when you can take another one after you get home.    Call your doctor if you have:  • Nausea and vomiting that does not stop  • Fever over 101° F  • Pain not relieved by pain medication  • If you are unable to  pass your urine or you have not passed urine in the last 8 hours.  • Unusual changes in behavior  • Dizziness  • Hives  If you are not able to reach your doctor, you may call the emergency department.    Wound Healing: What You Should Know    Do I have an infection?  Your body may show signs your wound is infected. If you see one or more of these signs, please call your health care provider:  • Redness and swelling - Increased redness and swelling around the wound (some redness is expected in the first 48 hours).  • Warmth - The area around the wound is warmer than the surrounding skin.  • Fever - Your temperature is above 101º F or stays above 100º F.  • Odor - A new or stronger, sweet or foul smell coming from the wound.  • Swelling - Swelling spreading to other areas.  • Drainage - Large amounts of drainage that involves blood, clear fluid or pus from the wound. Or, the drainage amount increases or changes color. (It is normal to have a small amount of drainage.)  • Pain - Increase in your pain or change in the location of the pain.  • Separation - Any place where the incision is  or opening.    How can I help prevent the spread of infection when I take care of my wound?  1. Use good handwashing techniques before and after contact with your wound. Here are the steps to follow:  Handwashing with soap and water:  • Wet hands with warm water and apply soap.  • Rub well over all surfaces for at least 15 seconds.  • Rinse well under water.  • Dry hands with clean towels.  • Turn off faucet with clean towels to prevent reinfecting hands.  Handwashing with a waterless alcohol-based product or gel:  • Apply approximately a nickel sized amount of product to palm of hand.  • Rub hands together, covering all surfaces including nails, until product evaporates, about 10 to 15 seconds.  2. Use clean latex or vinyl gloves if cleaning or touching wound surface.  3. Keep nails short and remove nail polish. Long nails or  polish can harbor bacteria.  4. Keep jewelry clean or remove during wound care.  5. Use hypoallergenic lotions with anti-bacterial agents on skin surrounding wound to maintain moisture and prevent irritation.    What else can I do to help my wound heal quickly and prevent other wounds?  • Stay active - Activity and exercise promote good circulation, which leads to wound healing.  • Avoid smoking - Smoking narrows your blood vessels, which decreases both the blood supply to your wound and the amount of oxygen in your blood. This will decrease your ability to heal and increase your chance of infection.  • Avoid alcohol - Alcohol decreases the ability of your body to fight infection.  • Avoid sitting with legs or ankles crossed - This can compress the blood vessels in your legs and decrease the blood supply to your wound.  • Eat a balanced diet - If you are unable to eat a balanced diet or required foods, you may benefit from a vitamin or mineral supplement.    Pain Management  Special Note: Be sure to follow any specific post-op instructions from your surgeon or nurse.   Once you’re home, you may have some pain, since even minor surgery causes swelling and breakdown of tissue. When it comes to effective pain management, the tips you learned in the hospital also work at home. To get the best pain relief possible, remember these points:    Use your medication only as directed  · If your pain is not relieved or if it gets worse, call your doctor.  · If pain lessens, try taking your medication less often.  Remember that medications need time to work  · Most pain relievers taken by mouth need at least 20-30 minutes to take effect.  · Take pain medication at regular times as directed. Don’t wait until the pain gets bad to take it.  Time your medication  · Try to time your medication so that you take it before beginning an activity, such as dressing or sitting at the table for dinner.  · Taking your medication at night may  help you get a good night’s rest.  Eat lots of fruit and vegetables  · Constipation is a common side effect with some pain medications. Eating fruit and vegetables can help.  · Drink lots of fluids.  · Avoid laxatives unless your surgeon has prescribed them for you.  Avoid drinking alcohol while taking pain medication  · Mixing alcohol and pain medication can cause dizziness and slow your respiratory system. It can even be fatal.  · Avoid driving or operating machinery while taking pain medication.     Private car

## 2022-05-05 NOTE — CHART NOTE - NSCHARTNOTEFT_GEN_A_CORE
SW placed call to patient to discuss and assist with follow up care.  Patient presented to ED on 4/28/22 for syncope.  Patient did not answer.  No appts shown on HIE.

## 2022-05-07 ENCOUNTER — NON-APPOINTMENT (OUTPATIENT)
Age: 77
End: 2022-05-07

## 2022-05-07 LAB
MISCELLANEOUS TEST: NORMAL
PROC NAME: NORMAL

## 2022-06-30 ENCOUNTER — APPOINTMENT (OUTPATIENT)
Dept: FAMILY MEDICINE | Facility: CLINIC | Age: 77
End: 2022-06-30

## 2022-06-30 VITALS
BODY MASS INDEX: 30.49 KG/M2 | SYSTOLIC BLOOD PRESSURE: 121 MMHG | HEART RATE: 79 BPM | OXYGEN SATURATION: 97 % | DIASTOLIC BLOOD PRESSURE: 81 MMHG | RESPIRATION RATE: 16 BRPM | HEIGHT: 65 IN | TEMPERATURE: 98.1 F | WEIGHT: 183 LBS

## 2022-06-30 PROCEDURE — 99214 OFFICE O/P EST MOD 30 MIN: CPT

## 2022-06-30 RX ORDER — PREDNISONE 10 MG/1
10 TABLET ORAL
Qty: 30 | Refills: 0 | Status: COMPLETED | COMMUNITY
Start: 2022-05-17

## 2022-06-30 RX ORDER — DOXYCYCLINE HYCLATE 100 MG/1
100 CAPSULE ORAL
Qty: 14 | Refills: 0 | Status: COMPLETED | COMMUNITY
Start: 2022-05-17

## 2022-06-30 RX ORDER — ALBUTEROL SULFATE 2.5 MG/3ML
(2.5 MG/3ML) SOLUTION RESPIRATORY (INHALATION)
Qty: 150 | Refills: 0 | Status: ACTIVE | COMMUNITY
Start: 2022-05-17

## 2022-06-30 RX ORDER — SODIUM PICOSULFATE, MAGNESIUM OXIDE, AND ANHYDROUS CITRIC ACID 10; 3.5; 12 MG/160ML; G/160ML; G/160ML
10-3.5-12 MG-GM LIQUID ORAL
Qty: 320 | Refills: 0 | Status: COMPLETED | COMMUNITY
Start: 2022-04-19

## 2022-06-30 NOTE — CURRENT MEDS
[Takes medication as prescribed] : takes [None] : Patient does not have any barriers to medication adherence [Yes] : Reviewed medication list for presence of high-risk medications. [FreeTextEntry1] : No high risk medications identified

## 2022-06-30 NOTE — PHYSICAL EXAM
[No Acute Distress] : no acute distress [Well Nourished] : well nourished [Well Developed] : well developed [Well-Appearing] : well-appearing [Normal Voice/Communication] : normal voice/communication [Normal Sclera/Conjunctiva] : normal sclera/conjunctiva [PERRL] : pupils equal round and reactive to light [EOMI] : extraocular movements intact [Normal Outer Ear/Nose] : the outer ears and nose were normal in appearance [Normal Oropharynx] : the oropharynx was normal [Normal TMs] : both tympanic membranes were normal [Normal Nasal Mucosa] : the nasal mucosa was normal [No JVD] : no jugular venous distention [No Lymphadenopathy] : no lymphadenopathy [Supple] : supple [Thyroid Normal, No Nodules] : the thyroid was normal and there were no nodules present [No Respiratory Distress] : no respiratory distress  [No Accessory Muscle Use] : no accessory muscle use [Clear to Auscultation] : lungs were clear to auscultation bilaterally [Normal Rate] : normal rate  [Regular Rhythm] : with a regular rhythm [Normal S1, S2] : normal S1 and S2 [No Murmur] : no murmur heard [No Carotid Bruits] : no carotid bruits [No Abdominal Bruit] : a ~M bruit was not heard ~T in the abdomen [No Varicosities] : no varicosities [Pedal Pulses Present] : the pedal pulses are present [No Edema] : there was no peripheral edema [No Palpable Aorta] : no palpable aorta [No Extremity Clubbing/Cyanosis] : no extremity clubbing/cyanosis [Soft] : abdomen soft [Non Tender] : non-tender [Non-distended] : non-distended [No Masses] : no abdominal mass palpated [No HSM] : no HSM [Normal Bowel Sounds] : normal bowel sounds [No Hernias] : no hernias [Normal Supraclavicular Nodes] : no supraclavicular lymphadenopathy [Normal Posterior Cervical Nodes] : no posterior cervical lymphadenopathy [Normal Anterior Cervical Nodes] : no anterior cervical lymphadenopathy [No CVA Tenderness] : no CVA  tenderness [No Spinal Tenderness] : no spinal tenderness [No Joint Swelling] : no joint swelling [Grossly Normal Strength/Tone] : grossly normal strength/tone [No Rash] : no rash [No Skin Lesions] : no skin lesions [Coordination Grossly Intact] : coordination grossly intact [No Focal Deficits] : no focal deficits [Normal Gait] : normal gait [Deep Tendon Reflexes (DTR)] : deep tendon reflexes were 2+ and symmetric [Speech Grossly Normal] : speech grossly normal [Normal Affect] : the affect was normal [Alert and Oriented x3] : oriented to person, place, and time [Normal Mood] : the mood was normal [Normal Insight/Judgement] : insight and judgment were intact [de-identified] : gyn [FreeTextEntry1] : GI [de-identified] : GYN [de-identified] : Dystrophic nails that his fingernails secondary to chronic trauma [de-identified] : forgetful

## 2022-06-30 NOTE — HISTORY OF PRESENT ILLNESS
[Family Member] : family member [FreeTextEntry1] : Please see HPI. [de-identified] : Patient is a 77-year-old female who presents today for follow-up and disease management patient's medical history is significant for hypertension, chronic obstructive pulmonary disease, forgetfulness, hyperlipidemia, diabetes mellitus type 2 patient is being followed presently by Dr. Del Real endocrinology medication regimen has not been started yet her most recent hemoglobin A1c 8.6 patient has appointment for follow-up in 2 weeks.\par \par I reviewed patient's entire chart including consultations with neurology status post FDG PET scan which showed consistency with frontotemporal dementia panel was negative patient did have second opinion by the neurologist for formal evaluation it appears that the patient has advancing Alzheimer's.

## 2022-06-30 NOTE — HEALTH RISK ASSESSMENT
[Never] : Never [No] : In the past 12 months have you used drugs other than those required for medical reasons? No [Any fall with injury in past year] : Patient reported fall with injury in the past year [Medical reason not done] : Medical reason not done [Audit-CScore] : 0 [de-identified] : Cognitive dysfunction.

## 2022-06-30 NOTE — COUNSELING
[Fall prevention counseling provided] : Fall prevention counseling provided [Adequate lighting] : Adequate lighting [No throw rugs] : No throw rugs [Use proper foot wear] : Use proper foot wear [Behavioral health counseling provided] : Behavioral health counseling provided [Sleep ___ hours/day] : Sleep [unfilled] hours/day [Engage in a relaxing activity] : Engage in a relaxing activity [Plan in advance] : Plan in advance [AUDIT-C Screening administered and reviewed] : AUDIT-C Screening administered and reviewed [Potential consequences of obesity discussed] : Potential consequences of obesity discussed [Benefits of weight loss discussed] : Benefits of weight loss discussed [Structured Weight Management Program suggested:] : Structured weight management program suggested [Encouraged to maintain food diary] : Encouraged to maintain food diary [Encouraged to increase physical activity] : Encouraged to increase physical activity [Encouraged to use exercise tracking device] : Encouraged to use exercise tracking device [Weigh Self Weekly] : weigh self weekly [Decrease Portions] : decrease portions [____ min/wk Activity] : [unfilled] min/wk activity [Keep Food Diary] : keep food diary [FreeTextEntry1] : pola [Limited decision making ability] : Limited decision making ability [Needs reinforcement, provided] : Patient needs reinforcement on understanding of lifestyle changes and steps needed to achieve self management goal; reinforcement was provided

## 2022-06-30 NOTE — ASSESSMENT
[FreeTextEntry1] : Assessment and plan:\par \par 1.  All precautions discussed with patient and daughter.\par \par 2.  Comprehensive blood work reviewed with patient and daughter blood work was drawn for endocrinology and her most recent hemoglobin A1c 8.6 patient has follow-up with endocrinology for orchestrating medications.\par \par 3.  Comprehensive pulmonology exam was done by pulmonology full pulmonary function test was done stable for patient.  Patient does have underlying chronic obstructive pulmonary disease presently well controlled with present medical management.\par \par 4.  Comprehensive cardiology exam done by cardiology patient given clean bill of health last year.\par \par 5.  Patient is up-to-date with COVID-19 vaccination including both boosters.\par \par 6.  Forgetfulness reviewed the most recent work-up and note from neurology PET scan shows findings which are consistent with frontal temporal lobe dementia yet the patient is extremely high functioning. Patient has been evaluated by neurology and also has had a second opinion I do not have the report from the second opinion but according to what daughter informs me that the patient was diagnosed with Alzheimer's.  Therefore we will continue donepezil for now.

## 2022-09-07 ENCOUNTER — NON-APPOINTMENT (OUTPATIENT)
Age: 77
End: 2022-09-07

## 2022-09-14 DIAGNOSIS — R92.2 INCONCLUSIVE MAMMOGRAM: ICD-10-CM

## 2022-09-30 ENCOUNTER — APPOINTMENT (OUTPATIENT)
Dept: FAMILY MEDICINE | Facility: CLINIC | Age: 77
End: 2022-09-30

## 2022-09-30 ENCOUNTER — RX RENEWAL (OUTPATIENT)
Age: 77
End: 2022-09-30

## 2022-10-07 ENCOUNTER — INPATIENT (INPATIENT)
Facility: HOSPITAL | Age: 77
LOS: 2 days | Discharge: HOME | DRG: 189 | End: 2022-10-10
Attending: INTERNAL MEDICINE | Admitting: FAMILY MEDICINE
Payer: MEDICARE

## 2022-10-07 ENCOUNTER — TRANSCRIPTION ENCOUNTER (OUTPATIENT)
Age: 77
End: 2022-10-07

## 2022-10-07 VITALS
SYSTOLIC BLOOD PRESSURE: 103 MMHG | RESPIRATION RATE: 16 BRPM | DIASTOLIC BLOOD PRESSURE: 79 MMHG | HEIGHT: 65 IN | WEIGHT: 179.9 LBS | TEMPERATURE: 100 F | HEART RATE: 83 BPM | OXYGEN SATURATION: 100 %

## 2022-10-07 DIAGNOSIS — Z90.49 ACQUIRED ABSENCE OF OTHER SPECIFIED PARTS OF DIGESTIVE TRACT: Chronic | ICD-10-CM

## 2022-10-07 DIAGNOSIS — J45.901 UNSPECIFIED ASTHMA WITH (ACUTE) EXACERBATION: ICD-10-CM

## 2022-10-07 LAB
ALBUMIN SERPL ELPH-MCNC: 3.2 G/DL — LOW (ref 3.3–5)
ALP SERPL-CCNC: 110 U/L — SIGNIFICANT CHANGE UP (ref 40–120)
ALT FLD-CCNC: 28 U/L — SIGNIFICANT CHANGE UP (ref 10–45)
ANION GAP SERPL CALC-SCNC: 9 MMOL/L — SIGNIFICANT CHANGE UP (ref 5–17)
APPEARANCE UR: CLEAR — SIGNIFICANT CHANGE UP
APTT BLD: 29.6 SEC — SIGNIFICANT CHANGE UP (ref 27.5–35.5)
AST SERPL-CCNC: 16 U/L — SIGNIFICANT CHANGE UP (ref 10–40)
BACTERIA # UR AUTO: NEGATIVE /HPF — SIGNIFICANT CHANGE UP
BASOPHILS # BLD AUTO: 0.07 K/UL — SIGNIFICANT CHANGE UP (ref 0–0.2)
BASOPHILS NFR BLD AUTO: 0.6 % — SIGNIFICANT CHANGE UP (ref 0–2)
BILIRUB SERPL-MCNC: 2.7 MG/DL — HIGH (ref 0.2–1.2)
BILIRUB UR-MCNC: NEGATIVE — SIGNIFICANT CHANGE UP
BUN SERPL-MCNC: 16 MG/DL — SIGNIFICANT CHANGE UP (ref 7–23)
CALCIUM SERPL-MCNC: 9.1 MG/DL — SIGNIFICANT CHANGE UP (ref 8.4–10.5)
CHLORIDE SERPL-SCNC: 106 MMOL/L — SIGNIFICANT CHANGE UP (ref 96–108)
CO2 SERPL-SCNC: 28 MMOL/L — SIGNIFICANT CHANGE UP (ref 22–31)
COLOR SPEC: YELLOW — SIGNIFICANT CHANGE UP
CREAT SERPL-MCNC: 1.36 MG/DL — HIGH (ref 0.5–1.3)
DIFF PNL FLD: ABNORMAL
EGFR: 40 ML/MIN/1.73M2 — LOW
EOSINOPHIL # BLD AUTO: 0.07 K/UL — SIGNIFICANT CHANGE UP (ref 0–0.5)
EOSINOPHIL NFR BLD AUTO: 0.6 % — SIGNIFICANT CHANGE UP (ref 0–6)
EPI CELLS # UR: SIGNIFICANT CHANGE UP
GLUCOSE BLDC GLUCOMTR-MCNC: 186 MG/DL — HIGH (ref 70–99)
GLUCOSE BLDC GLUCOMTR-MCNC: 319 MG/DL — HIGH (ref 70–99)
GLUCOSE SERPL-MCNC: 217 MG/DL — HIGH (ref 70–99)
GLUCOSE UR QL: NEGATIVE — SIGNIFICANT CHANGE UP
HCT VFR BLD CALC: 43.2 % — SIGNIFICANT CHANGE UP (ref 34.5–45)
HGB BLD-MCNC: 14.1 G/DL — SIGNIFICANT CHANGE UP (ref 11.5–15.5)
IMM GRANULOCYTES NFR BLD AUTO: 0.3 % — SIGNIFICANT CHANGE UP (ref 0–0.9)
INR BLD: 1.38 RATIO — HIGH (ref 0.88–1.16)
KETONES UR-MCNC: NEGATIVE — SIGNIFICANT CHANGE UP
LACTATE SERPL-SCNC: 2.1 MMOL/L — HIGH (ref 0.7–2)
LACTATE SERPL-SCNC: 3.7 MMOL/L — HIGH (ref 0.7–2)
LEUKOCYTE ESTERASE UR-ACNC: NEGATIVE — SIGNIFICANT CHANGE UP
LYMPHOCYTES # BLD AUTO: 1.76 K/UL — SIGNIFICANT CHANGE UP (ref 1–3.3)
LYMPHOCYTES # BLD AUTO: 15.1 % — SIGNIFICANT CHANGE UP (ref 13–44)
MCHC RBC-ENTMCNC: 27.8 PG — SIGNIFICANT CHANGE UP (ref 27–34)
MCHC RBC-ENTMCNC: 32.6 GM/DL — SIGNIFICANT CHANGE UP (ref 32–36)
MCV RBC AUTO: 85.2 FL — SIGNIFICANT CHANGE UP (ref 80–100)
MONOCYTES # BLD AUTO: 1.16 K/UL — HIGH (ref 0–0.9)
MONOCYTES NFR BLD AUTO: 9.9 % — SIGNIFICANT CHANGE UP (ref 2–14)
NEUTROPHILS # BLD AUTO: 8.6 K/UL — HIGH (ref 1.8–7.4)
NEUTROPHILS NFR BLD AUTO: 73.5 % — SIGNIFICANT CHANGE UP (ref 43–77)
NITRITE UR-MCNC: NEGATIVE — SIGNIFICANT CHANGE UP
NRBC # BLD: 0 /100 WBCS — SIGNIFICANT CHANGE UP (ref 0–0)
PH UR: 5 — SIGNIFICANT CHANGE UP (ref 5–8)
PLATELET # BLD AUTO: 253 K/UL — SIGNIFICANT CHANGE UP (ref 150–400)
POTASSIUM SERPL-MCNC: 3.2 MMOL/L — LOW (ref 3.5–5.3)
POTASSIUM SERPL-SCNC: 3.2 MMOL/L — LOW (ref 3.5–5.3)
PROT SERPL-MCNC: 6.3 G/DL — SIGNIFICANT CHANGE UP (ref 6–8.3)
PROT UR-MCNC: 30 MG/DL
PROTHROM AB SERPL-ACNC: 16.1 SEC — HIGH (ref 10.5–13.4)
RAPID RVP RESULT: DETECTED
RBC # BLD: 5.07 M/UL — SIGNIFICANT CHANGE UP (ref 3.8–5.2)
RBC # FLD: 14.1 % — SIGNIFICANT CHANGE UP (ref 10.3–14.5)
RBC CASTS # UR COMP ASSIST: SIGNIFICANT CHANGE UP /HPF (ref 0–4)
RV+EV RNA SPEC QL NAA+PROBE: DETECTED
SARS-COV-2 RNA SPEC QL NAA+PROBE: SIGNIFICANT CHANGE UP
SODIUM SERPL-SCNC: 143 MMOL/L — SIGNIFICANT CHANGE UP (ref 135–145)
SP GR SPEC: 1.01 — SIGNIFICANT CHANGE UP (ref 1.01–1.02)
TROPONIN I, HIGH SENSITIVITY RESULT: 7.2 NG/L — SIGNIFICANT CHANGE UP
TROPONIN I, HIGH SENSITIVITY RESULT: 7.6 NG/L — SIGNIFICANT CHANGE UP
UROBILINOGEN FLD QL: NEGATIVE — SIGNIFICANT CHANGE UP
WBC # BLD: 11.69 K/UL — HIGH (ref 3.8–10.5)
WBC # FLD AUTO: 11.69 K/UL — HIGH (ref 3.8–10.5)
WBC UR QL: SIGNIFICANT CHANGE UP /HPF (ref 0–5)

## 2022-10-07 PROCEDURE — 71045 X-RAY EXAM CHEST 1 VIEW: CPT | Mod: 26

## 2022-10-07 PROCEDURE — 93010 ELECTROCARDIOGRAM REPORT: CPT

## 2022-10-07 PROCEDURE — 71275 CT ANGIOGRAPHY CHEST: CPT | Mod: 26,MA

## 2022-10-07 PROCEDURE — 99285 EMERGENCY DEPT VISIT HI MDM: CPT

## 2022-10-07 PROCEDURE — 99497 ADVNCD CARE PLAN 30 MIN: CPT | Mod: 25

## 2022-10-07 PROCEDURE — 99223 1ST HOSP IP/OBS HIGH 75: CPT

## 2022-10-07 RX ORDER — DEXTROSE 50 % IN WATER 50 %
25 SYRINGE (ML) INTRAVENOUS ONCE
Refills: 0 | Status: DISCONTINUED | OUTPATIENT
Start: 2022-10-07 | End: 2022-10-10

## 2022-10-07 RX ORDER — SODIUM CHLORIDE 9 MG/ML
1000 INJECTION, SOLUTION INTRAVENOUS
Refills: 0 | Status: DISCONTINUED | OUTPATIENT
Start: 2022-10-07 | End: 2022-10-09

## 2022-10-07 RX ORDER — DONEPEZIL HYDROCHLORIDE 10 MG/1
10 TABLET, FILM COATED ORAL AT BEDTIME
Refills: 0 | Status: DISCONTINUED | OUTPATIENT
Start: 2022-10-07 | End: 2022-10-10

## 2022-10-07 RX ORDER — ATORVASTATIN CALCIUM 80 MG/1
20 TABLET, FILM COATED ORAL AT BEDTIME
Refills: 0 | Status: DISCONTINUED | OUTPATIENT
Start: 2022-10-07 | End: 2022-10-10

## 2022-10-07 RX ORDER — LANOLIN ALCOHOL/MO/W.PET/CERES
3 CREAM (GRAM) TOPICAL AT BEDTIME
Refills: 0 | Status: DISCONTINUED | OUTPATIENT
Start: 2022-10-07 | End: 2022-10-10

## 2022-10-07 RX ORDER — GLUCAGON INJECTION, SOLUTION 0.5 MG/.1ML
1 INJECTION, SOLUTION SUBCUTANEOUS ONCE
Refills: 0 | Status: DISCONTINUED | OUTPATIENT
Start: 2022-10-07 | End: 2022-10-10

## 2022-10-07 RX ORDER — SODIUM CHLORIDE 9 MG/ML
1000 INJECTION, SOLUTION INTRAVENOUS
Refills: 0 | Status: DISCONTINUED | OUTPATIENT
Start: 2022-10-07 | End: 2022-10-10

## 2022-10-07 RX ORDER — PANTOPRAZOLE SODIUM 20 MG/1
40 TABLET, DELAYED RELEASE ORAL EVERY 24 HOURS
Refills: 0 | Status: DISCONTINUED | OUTPATIENT
Start: 2022-10-07 | End: 2022-10-10

## 2022-10-07 RX ORDER — DEXTROSE 50 % IN WATER 50 %
12.5 SYRINGE (ML) INTRAVENOUS ONCE
Refills: 0 | Status: DISCONTINUED | OUTPATIENT
Start: 2022-10-07 | End: 2022-10-10

## 2022-10-07 RX ORDER — LOSARTAN POTASSIUM 100 MG/1
50 TABLET, FILM COATED ORAL DAILY
Refills: 0 | Status: DISCONTINUED | OUTPATIENT
Start: 2022-10-07 | End: 2022-10-10

## 2022-10-07 RX ORDER — CEFTRIAXONE 500 MG/1
1000 INJECTION, POWDER, FOR SOLUTION INTRAMUSCULAR; INTRAVENOUS ONCE
Refills: 0 | Status: COMPLETED | OUTPATIENT
Start: 2022-10-07 | End: 2022-10-07

## 2022-10-07 RX ORDER — SODIUM CHLORIDE 9 MG/ML
1500 INJECTION INTRAMUSCULAR; INTRAVENOUS; SUBCUTANEOUS ONCE
Refills: 0 | Status: COMPLETED | OUTPATIENT
Start: 2022-10-07 | End: 2022-10-07

## 2022-10-07 RX ORDER — INSULIN LISPRO 100/ML
VIAL (ML) SUBCUTANEOUS AT BEDTIME
Refills: 0 | Status: DISCONTINUED | OUTPATIENT
Start: 2022-10-07 | End: 2022-10-10

## 2022-10-07 RX ORDER — IPRATROPIUM/ALBUTEROL SULFATE 18-103MCG
3 AEROSOL WITH ADAPTER (GRAM) INHALATION ONCE
Refills: 0 | Status: COMPLETED | OUTPATIENT
Start: 2022-10-07 | End: 2022-10-07

## 2022-10-07 RX ORDER — INSULIN LISPRO 100/ML
VIAL (ML) SUBCUTANEOUS
Refills: 0 | Status: DISCONTINUED | OUTPATIENT
Start: 2022-10-07 | End: 2022-10-10

## 2022-10-07 RX ORDER — POTASSIUM CHLORIDE 20 MEQ
40 PACKET (EA) ORAL ONCE
Refills: 0 | Status: COMPLETED | OUTPATIENT
Start: 2022-10-07 | End: 2022-10-07

## 2022-10-07 RX ORDER — DEXAMETHASONE 0.5 MG/5ML
6 ELIXIR ORAL ONCE
Refills: 0 | Status: COMPLETED | OUTPATIENT
Start: 2022-10-07 | End: 2022-10-07

## 2022-10-07 RX ORDER — ALBUTEROL 90 UG/1
1.25 AEROSOL, METERED ORAL EVERY 8 HOURS
Refills: 0 | Status: DISCONTINUED | OUTPATIENT
Start: 2022-10-07 | End: 2022-10-10

## 2022-10-07 RX ORDER — ONDANSETRON 8 MG/1
4 TABLET, FILM COATED ORAL EVERY 8 HOURS
Refills: 0 | Status: DISCONTINUED | OUTPATIENT
Start: 2022-10-07 | End: 2022-10-10

## 2022-10-07 RX ORDER — DEXTROSE 50 % IN WATER 50 %
15 SYRINGE (ML) INTRAVENOUS ONCE
Refills: 0 | Status: DISCONTINUED | OUTPATIENT
Start: 2022-10-07 | End: 2022-10-10

## 2022-10-07 RX ORDER — ACETAMINOPHEN 500 MG
650 TABLET ORAL EVERY 6 HOURS
Refills: 0 | Status: DISCONTINUED | OUTPATIENT
Start: 2022-10-07 | End: 2022-10-10

## 2022-10-07 RX ADMIN — CEFTRIAXONE 100 MILLIGRAM(S): 500 INJECTION, POWDER, FOR SOLUTION INTRAMUSCULAR; INTRAVENOUS at 19:39

## 2022-10-07 RX ADMIN — Medication 40 MILLIEQUIVALENT(S): at 22:24

## 2022-10-07 RX ADMIN — Medication 3 MILLILITER(S): at 18:26

## 2022-10-07 RX ADMIN — CEFTRIAXONE 1000 MILLIGRAM(S): 500 INJECTION, POWDER, FOR SOLUTION INTRAMUSCULAR; INTRAVENOUS at 20:39

## 2022-10-07 RX ADMIN — SODIUM CHLORIDE 1500 MILLILITER(S): 9 INJECTION INTRAMUSCULAR; INTRAVENOUS; SUBCUTANEOUS at 20:39

## 2022-10-07 RX ADMIN — Medication 2: at 23:10

## 2022-10-07 RX ADMIN — Medication 6 MILLIGRAM(S): at 18:26

## 2022-10-07 RX ADMIN — SODIUM CHLORIDE 1500 MILLILITER(S): 9 INJECTION INTRAMUSCULAR; INTRAVENOUS; SUBCUTANEOUS at 19:39

## 2022-10-07 NOTE — H&P ADULT - NSHPLABSRESULTS_GEN_ALL_CORE
14.1   11.69 )-----------( 253      ( 07 Oct 2022 18:15 )             43.2       10-07    143  |  106  |  16  ----------------------------<  217<H>  3.2<L>   |  28  |  1.36<H>    Ca    9.1      07 Oct 2022 18:15    TPro  6.3  /  Alb  3.2<L>  /  TBili  2.7<H>  /  DBili  x   /  AST  16  /  ALT  28  /  AlkPhos  110  10-07              Urinalysis Basic - ( 07 Oct 2022 20:15 )    Color: Yellow / Appearance: Clear / S.010 / pH: x  Gluc: x / Ketone: Negative  / Bili: Negative / Urobili: Negative   Blood: x / Protein: 30 mg/dL / Nitrite: Negative   Leuk Esterase: Negative / RBC: 0-4 /HPF / WBC 0-2 /HPF   Sq Epi: x / Non Sq Epi: Neg.-Few / Bacteria: Negative /HPF    Troponin I, High Sensitivity Result: 7.6: Serial measurements of high sensitivity troponin I (hs TnI) showing rapid  upward or downward changes suggest acute myocardial injury.  Please note  that a sustained elevation of hsTnI can be caused by renal disease,  chronic heart failure, sepsis, pulmonary embolism and other clinical  conditions. ng/L (10.07.22 @ 18:15)      Troponin I, High Sensitivity Result: 7.2: Serial measurements of high sensitivity troponin I (hs TnI) showing rapid  upward or downward changes suggest acute myocardial injury.  Please note  that a sustained elevation of hsTnI can be caused by renal disease,  chronic heart failure, sepsis, pulmonary embolism and other clinical  conditions. ng/L (10.07.22 @ 21:00)      PT/INR - ( 07 Oct 2022 18:15 )   PT: 16.1 sec;   INR: 1.38 ratio         PTT - ( 07 Oct 2022 18:15 )  PTT:29.6 sec    Lactate Trend  10-07 @ 18:25 Lactate:3.7     CAPILLARY BLOOD GLUCOSE      POCT Blood Glucose.: 186 mg/dL (07 Oct 2022 17:57)    Labs reviewed:     CXR personally reviewed: napd    < from: CT Angio Chest PE Protocol w/ IV Cont (10.07.22 @ 19:46) >      IMPRESSION:  No pulmonary embolism.    < end of copied text >        ECG reviewed and interpreted: sinus at 82

## 2022-10-07 NOTE — ED PROVIDER NOTE - NS ED ROS FT
Constitutiona: + fever  Eyes: No visual changes  HEENT: No throat pain  CV: No chest pain  Resp: + sob  GI: No abd pain, nausea or vomiting  : No dysuria  MSK: No musculoskeletal pain  Skin: No rash  Neuro: No headache

## 2022-10-07 NOTE — GOALS OF CARE CONVERSATION - ADVANCED CARE PLANNING - CONVERSATION DETAILS
GOC - discussed with patient and daughter-   wishes to be full code, wants full resuscitation  time spent in goc discussion 16 min.

## 2022-10-07 NOTE — PATIENT PROFILE ADULT - FALL HARM RISK - HARM RISK INTERVENTIONS

## 2022-10-07 NOTE — H&P ADULT - ASSESSMENT
78 yo f with pmh/o asthma, htn, hld, dm2 presents with sob, fever, chills  2 days.      - asthma exacerbation  - SOB, malaise, weakness DUE TO acute hypoxic respiratory failure likely due to entero/rhino virus  - concern for afib on EMS strip- likely poor quality, doubt afib, ekg sinus in ED, r/o ACS  admit to tele  cta negative for PE  cardiac monitoring  trop x 2 negative   will repeat in am  TTE  in am  BC X 2  ua negative  received Rocephin on ED despite allergy, will change to Levaquin to start in am  solumedrol 40 q12 x 4 doses then reassess  am labs  o2 support  duonebs q8 hr  ppi for gi ppx      MILD REJI - likely due to viral infection  Creatinine, Serum: 1.22 mg/dL (04.28.22 @ 01:26)  Creatinine, Serum: 1.36 mg/dL (10.07.22 @ 18:15)  Gentle hydration  monitor  will not hold losartan  labs in am     DM2-  ON METFORMIN- will hold  ISS, Accu-Cheks  Hemoglobin A1C, Whole Blood: 6.6 % (12.19.19 @ 06:10)    HTN- c/w losartan    HLD- c/w statins    vte ppx- SCD, early ambulation    GOC - discussed with patient and daughter-   wishes to be full code, wants full resuscitation  time spent in goc discussion 16 min.          78 yo f with pmh/o asthma, htn, hld, dm2 presents with sob, fever, chills  2 days.      - asthma exacerbation  - SOB, malaise, weakness DUE TO acute hypoxic respiratory failure likely due to entero/rhino virus  - mild leukocytosis, elevation in lactate  - concern for afib on EMS strip- likely poor quality, doubt afib, ekg sinus in ED, r/o ACS  admit to tele  cta negative for PE  cardiac monitoring  trop x 2 negative   will repeat in am  TTE  in am  BC X 2  ua negative  received Rocephin on ED despite allergy, will change to Levaquin to start in am  solumedrol 40 q12 x 4 doses then reassess  am labs  o2 support  duonebs q8 hr  ppi for gi ppx  recheck lactate in am  received 1500 cc in ED  will c/w iv fluids    hypokalemia-  repleteq 40 meq x 1 in ED  check levels in am  will add mag level for am    MILD REJI - likely due to viral infection  Creatinine, Serum: 1.22 mg/dL (04.28.22 @ 01:26)  Creatinine, Serum: 1.36 mg/dL (10.07.22 @ 18:15)  Gentle hydration  monitor  will not hold losartan  labs in am     DM2-  ON METFORMIN- will hold  ISS, Accu-Cheks  Hemoglobin A1C, Whole Blood: 6.6 % (12.19.19 @ 06:10)    HTN- c/w losartan    HLD- c/w statins    vte ppx- SCD, early ambulation    GOC - discussed with patient and daughter-   wishes to be full code, wants full resuscitation  time spent in goc discussion 16 min.

## 2022-10-07 NOTE — ED ADULT TRIAGE NOTE - CHIEF COMPLAINT QUOTE
BIB EMS from home c/o weakness, sob and wheezing. Pt also noted to be in new onset afib. Per EMS, pt O2 sat was 80s on RA, given 2 duonebs PTA, O2 sat now 100% with NRB mask

## 2022-10-07 NOTE — ED ADULT NURSE NOTE - PAIN RATING/NUMBER SCALE (0-10): ACTIVITY
7B-PT: CXL: PT consult received and appreciated. Pt currently with up ad robby orders, however waiting on clarification from ortho team to ensure safe mobilization with osteomyelitis and abscess + use of prosthetic. Per RN note but mobilizing indep with wc. PT will continue to follow and initiate as appropriate/with clarification.    0

## 2022-10-07 NOTE — ED ADULT NURSE NOTE - OBJECTIVE STATEMENT
Patient came from EMS for SOB Patient came from EMS for SOB at home, on RA noted to be 80%, placed on nonrebreather, Per EMS, given two Dounebs during route. Patient denies any pain or trauma. Noted ot have chills. Per family, patient had a temperature yesterday however no fever today however had poor appetite and only ate a yogurt and Gatorade. Denies any nausea, vomit, diarrhea or constipation. Patient has a hx of asthma and DM.

## 2022-10-07 NOTE — H&P ADULT - HISTORY OF PRESENT ILLNESS
78 yo f with asthma, htn, hld, dm2 presents with sob, fever, chills  2 days.    history obtained form daughter who states that, day before yesterday patient was c/o some malaise and mild sob, yesterday evening SOB worsened and she developed chills and low grade temp. of 100.7f , so dtr called EMS.   symptoms associated with some dizziness, no cp, no palpitations, no n/v, no prior complaints.     per EMS ekg strip - concern for afib, ht 150's,  ekg in ED shows sinus at 82   pt O2 sat was 80s on RA on arrival to ED, she was given 2 Duonebs, O2 sat improved to  100%  in ED she received Rocephin Duoneb and dexa     on admission she is feeling better, and is in no distress.  but noted to be wheezing +    76 yo f with asthma, htn, hld, dm2 presents with sob, fever, chills  2 days.    history obtained form daughter who states that, day before yesterday patient was c/o some malaise and mild sob, yesterday evening SOB worsened and she developed chills and low grade temp. of 100.7f , so dtr called EMS.   symptoms associated with some dizziness, no cp, no palpitations, no n/v, no prior complaints.     per EMS ekg strip - concern for afib, hr 150's,  ekg in ED shows sinus at 82   pt O2 sat was 80s on RA on arrival to ED, she was given 2 Duonebs, O2 sat improved to  100%  in ED she received Rocephin Duoneb and dexa     on admission she is feeling better, and is in no distress.  but noted to be wheezing +

## 2022-10-07 NOTE — H&P ADULT - NSHPPHYSICALEXAM_GEN_ALL_CORE
Vital Signs Last 24 Hrs  T(C): 37.6 (07 Oct 2022 17:41), Max: 37.6 (07 Oct 2022 17:41)  T(F): 99.7 (07 Oct 2022 17:41), Max: 99.7 (07 Oct 2022 17:41)  HR: 87 (07 Oct 2022 20:26) (81 - 88)  BP: 132/60 (07 Oct 2022 20:26) (103/79 - 132/60)  BP(mean): --  RR: 20 (07 Oct 2022 20:26) (16 - 22)  SpO2: 98% (07 Oct 2022 20:26) (82% - 100%)    Parameters below as of 07 Oct 2022 20:26  Patient On (Oxygen Delivery Method): room air      GENERAL- NAD  EAR/NOSE/MOUTH/THROAT - no pharyngeal exudates, no oral lesions,  MMM  EYES- CRISTINE, conjunctiva and Sclera clear  NECK- supple  RESPIRATORY-  rhonchi+, wheezing+ auscultation bilaterally, non laboured breathing  CARDIOVASCULAR - SIS2, RRR  GI - soft NT BS present  EXTREMITIES- no pedal edema  NEUROLOGY- no gross focal deficits  SKIN- no rashes, warm to touch  PSYCHIATRY- AAO X 3  MUSCULOSKELETAL- ROM normal

## 2022-10-07 NOTE — ED PROVIDER NOTE - PHYSICAL EXAMINATION
Constitutional:  middle aged f having chills, aaox4  Eyes: PERRLA EOMI  Head: Normocephalic atraumatic  Mouth: MMM  Cardiac: regular rate   Resp: mild b/l wheezing  GI: Abd s/nt/nd, no rebound or guarding.  Neuro: awake, alert, moving all extremities, cranial nerves 2-12 intact, sensation intact, no dysmetria.  Skin: No rashes

## 2022-10-07 NOTE — H&P ADULT - NSICDXFAMILYHX_GEN_ALL_CORE_FT
FAMILY HISTORY:  Father  Still living? Unknown  Family history of diabetes mellitus (DM), Age at diagnosis: Age Unknown    Mother  Still living? No  FH: HTN (hypertension), Age at diagnosis: Age Unknown

## 2022-10-07 NOTE — ED PROVIDER NOTE - OBJECTIVE STATEMENT
78 yo f with asthma, htn, hld, dm presents with sob, fever, chills  2 days. She is vaccinated for covid but states today she began feeling sob worse than usual. She is not on any oxygen at home and felt very sob so came to ed. No precipitating or alleviating factors. No leg swelling or recent travel. No sick contacts, no cp

## 2022-10-08 LAB
A1C WITH ESTIMATED AVERAGE GLUCOSE RESULT: 6.7 % — HIGH (ref 4–5.6)
ALBUMIN SERPL ELPH-MCNC: 3.2 G/DL — LOW (ref 3.3–5)
ALP SERPL-CCNC: 103 U/L — SIGNIFICANT CHANGE UP (ref 40–120)
ALT FLD-CCNC: 23 U/L — SIGNIFICANT CHANGE UP (ref 10–45)
ANION GAP SERPL CALC-SCNC: 9 MMOL/L — SIGNIFICANT CHANGE UP (ref 5–17)
AST SERPL-CCNC: 16 U/L — SIGNIFICANT CHANGE UP (ref 10–40)
BASOPHILS # BLD AUTO: 0.01 K/UL — SIGNIFICANT CHANGE UP (ref 0–0.2)
BASOPHILS NFR BLD AUTO: 0.1 % — SIGNIFICANT CHANGE UP (ref 0–2)
BILIRUB SERPL-MCNC: 1.7 MG/DL — HIGH (ref 0.2–1.2)
BUN SERPL-MCNC: 18 MG/DL — SIGNIFICANT CHANGE UP (ref 7–23)
CALCIUM SERPL-MCNC: 9 MG/DL — SIGNIFICANT CHANGE UP (ref 8.4–10.5)
CHLORIDE SERPL-SCNC: 107 MMOL/L — SIGNIFICANT CHANGE UP (ref 96–108)
CO2 SERPL-SCNC: 24 MMOL/L — SIGNIFICANT CHANGE UP (ref 22–31)
CREAT SERPL-MCNC: 0.99 MG/DL — SIGNIFICANT CHANGE UP (ref 0.5–1.3)
EGFR: 59 ML/MIN/1.73M2 — LOW
EOSINOPHIL # BLD AUTO: 0 K/UL — SIGNIFICANT CHANGE UP (ref 0–0.5)
EOSINOPHIL NFR BLD AUTO: 0 % — SIGNIFICANT CHANGE UP (ref 0–6)
ESTIMATED AVERAGE GLUCOSE: 146 MG/DL — HIGH (ref 68–114)
GLUCOSE BLDC GLUCOMTR-MCNC: 174 MG/DL — HIGH (ref 70–99)
GLUCOSE BLDC GLUCOMTR-MCNC: 201 MG/DL — HIGH (ref 70–99)
GLUCOSE BLDC GLUCOMTR-MCNC: 229 MG/DL — HIGH (ref 70–99)
GLUCOSE BLDC GLUCOMTR-MCNC: 258 MG/DL — HIGH (ref 70–99)
GLUCOSE SERPL-MCNC: 245 MG/DL — HIGH (ref 70–99)
HCT VFR BLD CALC: 37.9 % — SIGNIFICANT CHANGE UP (ref 34.5–45)
HGB BLD-MCNC: 12.6 G/DL — SIGNIFICANT CHANGE UP (ref 11.5–15.5)
IMM GRANULOCYTES NFR BLD AUTO: 0.4 % — SIGNIFICANT CHANGE UP (ref 0–0.9)
LACTATE SERPL-SCNC: 2.1 MMOL/L — HIGH (ref 0.7–2)
LACTATE SERPL-SCNC: 2.2 MMOL/L — HIGH (ref 0.7–2)
LACTATE SERPL-SCNC: 3.5 MMOL/L — HIGH (ref 0.7–2)
LYMPHOCYTES # BLD AUTO: 0.58 K/UL — LOW (ref 1–3.3)
LYMPHOCYTES # BLD AUTO: 8.2 % — LOW (ref 13–44)
MAGNESIUM SERPL-MCNC: 1.6 MG/DL — SIGNIFICANT CHANGE UP (ref 1.6–2.6)
MCHC RBC-ENTMCNC: 28.1 PG — SIGNIFICANT CHANGE UP (ref 27–34)
MCHC RBC-ENTMCNC: 33.2 GM/DL — SIGNIFICANT CHANGE UP (ref 32–36)
MCV RBC AUTO: 84.6 FL — SIGNIFICANT CHANGE UP (ref 80–100)
MONOCYTES # BLD AUTO: 0.21 K/UL — SIGNIFICANT CHANGE UP (ref 0–0.9)
MONOCYTES NFR BLD AUTO: 3 % — SIGNIFICANT CHANGE UP (ref 2–14)
NEUTROPHILS # BLD AUTO: 6.23 K/UL — SIGNIFICANT CHANGE UP (ref 1.8–7.4)
NEUTROPHILS NFR BLD AUTO: 88.3 % — HIGH (ref 43–77)
NRBC # BLD: 0 /100 WBCS — SIGNIFICANT CHANGE UP (ref 0–0)
PHOSPHATE SERPL-MCNC: 3.2 MG/DL — SIGNIFICANT CHANGE UP (ref 2.5–4.5)
PLATELET # BLD AUTO: 251 K/UL — SIGNIFICANT CHANGE UP (ref 150–400)
POTASSIUM SERPL-MCNC: 4.4 MMOL/L — SIGNIFICANT CHANGE UP (ref 3.5–5.3)
POTASSIUM SERPL-SCNC: 4.4 MMOL/L — SIGNIFICANT CHANGE UP (ref 3.5–5.3)
PROCALCITONIN SERPL-MCNC: 0.12 NG/ML — HIGH
PROCALCITONIN SERPL-MCNC: 0.12 NG/ML — HIGH
PROT SERPL-MCNC: 6.2 G/DL — SIGNIFICANT CHANGE UP (ref 6–8.3)
RBC # BLD: 4.48 M/UL — SIGNIFICANT CHANGE UP (ref 3.8–5.2)
RBC # FLD: 14.1 % — SIGNIFICANT CHANGE UP (ref 10.3–14.5)
SODIUM SERPL-SCNC: 140 MMOL/L — SIGNIFICANT CHANGE UP (ref 135–145)
TROPONIN I, HIGH SENSITIVITY RESULT: 5.9 NG/L — SIGNIFICANT CHANGE UP
WBC # BLD: 7.06 K/UL — SIGNIFICANT CHANGE UP (ref 3.8–10.5)
WBC # FLD AUTO: 7.06 K/UL — SIGNIFICANT CHANGE UP (ref 3.8–10.5)

## 2022-10-08 PROCEDURE — 99233 SBSQ HOSP IP/OBS HIGH 50: CPT

## 2022-10-08 PROCEDURE — 72072 X-RAY EXAM THORAC SPINE 3VWS: CPT | Mod: 26

## 2022-10-08 PROCEDURE — 93306 TTE W/DOPPLER COMPLETE: CPT | Mod: 26

## 2022-10-08 PROCEDURE — 72100 X-RAY EXAM L-S SPINE 2/3 VWS: CPT | Mod: 26

## 2022-10-08 PROCEDURE — 99222 1ST HOSP IP/OBS MODERATE 55: CPT

## 2022-10-08 RX ORDER — INSULIN LISPRO 100/ML
2 VIAL (ML) SUBCUTANEOUS
Refills: 0 | Status: DISCONTINUED | OUTPATIENT
Start: 2022-10-08 | End: 2022-10-09

## 2022-10-08 RX ORDER — INSULIN GLARGINE 100 [IU]/ML
12 INJECTION, SOLUTION SUBCUTANEOUS AT BEDTIME
Refills: 0 | Status: DISCONTINUED | OUTPATIENT
Start: 2022-10-08 | End: 2022-10-09

## 2022-10-08 RX ORDER — BUDESONIDE, MICRONIZED 100 %
0.5 POWDER (GRAM) MISCELLANEOUS
Refills: 0 | Status: DISCONTINUED | OUTPATIENT
Start: 2022-10-08 | End: 2022-10-09

## 2022-10-08 RX ADMIN — Medication 2 UNIT(S): at 17:10

## 2022-10-08 RX ADMIN — PANTOPRAZOLE SODIUM 40 MILLIGRAM(S): 20 TABLET, DELAYED RELEASE ORAL at 05:15

## 2022-10-08 RX ADMIN — SODIUM CHLORIDE 100 MILLILITER(S): 9 INJECTION, SOLUTION INTRAVENOUS at 21:15

## 2022-10-08 RX ADMIN — Medication 2: at 08:08

## 2022-10-08 RX ADMIN — Medication 0.5 MILLIGRAM(S): at 21:28

## 2022-10-08 RX ADMIN — ALBUTEROL 1.25 MILLIGRAM(S): 90 AEROSOL, METERED ORAL at 08:09

## 2022-10-08 RX ADMIN — Medication 2 UNIT(S): at 11:30

## 2022-10-08 RX ADMIN — LOSARTAN POTASSIUM 50 MILLIGRAM(S): 100 TABLET, FILM COATED ORAL at 05:15

## 2022-10-08 RX ADMIN — SODIUM CHLORIDE 100 MILLILITER(S): 9 INJECTION, SOLUTION INTRAVENOUS at 05:15

## 2022-10-08 RX ADMIN — INSULIN GLARGINE 12 UNIT(S): 100 INJECTION, SOLUTION SUBCUTANEOUS at 21:15

## 2022-10-08 RX ADMIN — ATORVASTATIN CALCIUM 20 MILLIGRAM(S): 80 TABLET, FILM COATED ORAL at 21:15

## 2022-10-08 RX ADMIN — Medication 3: at 11:28

## 2022-10-08 RX ADMIN — Medication 40 MILLIGRAM(S): at 17:12

## 2022-10-08 RX ADMIN — ALBUTEROL 1.25 MILLIGRAM(S): 90 AEROSOL, METERED ORAL at 01:10

## 2022-10-08 RX ADMIN — Medication 1: at 17:10

## 2022-10-08 RX ADMIN — ALBUTEROL 1.25 MILLIGRAM(S): 90 AEROSOL, METERED ORAL at 15:08

## 2022-10-08 RX ADMIN — ALBUTEROL 1.25 MILLIGRAM(S): 90 AEROSOL, METERED ORAL at 21:28

## 2022-10-08 RX ADMIN — DONEPEZIL HYDROCHLORIDE 10 MILLIGRAM(S): 10 TABLET, FILM COATED ORAL at 21:15

## 2022-10-08 RX ADMIN — Medication 40 MILLIGRAM(S): at 05:15

## 2022-10-09 LAB
A1C WITH ESTIMATED AVERAGE GLUCOSE RESULT: 6.8 % — HIGH (ref 4–5.6)
ANION GAP SERPL CALC-SCNC: 9 MMOL/L — SIGNIFICANT CHANGE UP (ref 5–17)
BASOPHILS # BLD AUTO: 0.02 K/UL — SIGNIFICANT CHANGE UP (ref 0–0.2)
BASOPHILS NFR BLD AUTO: 0.2 % — SIGNIFICANT CHANGE UP (ref 0–2)
BUN SERPL-MCNC: 25 MG/DL — HIGH (ref 7–23)
CALCIUM SERPL-MCNC: 9.2 MG/DL — SIGNIFICANT CHANGE UP (ref 8.4–10.5)
CHLORIDE SERPL-SCNC: 107 MMOL/L — SIGNIFICANT CHANGE UP (ref 96–108)
CO2 SERPL-SCNC: 26 MMOL/L — SIGNIFICANT CHANGE UP (ref 22–31)
CREAT SERPL-MCNC: 1.07 MG/DL — SIGNIFICANT CHANGE UP (ref 0.5–1.3)
CULTURE RESULTS: SIGNIFICANT CHANGE UP
EGFR: 54 ML/MIN/1.73M2 — LOW
EOSINOPHIL # BLD AUTO: 0 K/UL — SIGNIFICANT CHANGE UP (ref 0–0.5)
EOSINOPHIL NFR BLD AUTO: 0 % — SIGNIFICANT CHANGE UP (ref 0–6)
ESTIMATED AVERAGE GLUCOSE: 148 MG/DL — HIGH (ref 68–114)
GLUCOSE BLDC GLUCOMTR-MCNC: 143 MG/DL — HIGH (ref 70–99)
GLUCOSE BLDC GLUCOMTR-MCNC: 183 MG/DL — HIGH (ref 70–99)
GLUCOSE BLDC GLUCOMTR-MCNC: 191 MG/DL — HIGH (ref 70–99)
GLUCOSE BLDC GLUCOMTR-MCNC: 201 MG/DL — HIGH (ref 70–99)
GLUCOSE SERPL-MCNC: 198 MG/DL — HIGH (ref 70–99)
HCT VFR BLD CALC: 37.7 % — SIGNIFICANT CHANGE UP (ref 34.5–45)
HGB BLD-MCNC: 12.4 G/DL — SIGNIFICANT CHANGE UP (ref 11.5–15.5)
IMM GRANULOCYTES NFR BLD AUTO: 0.6 % — SIGNIFICANT CHANGE UP (ref 0–0.9)
LACTATE SERPL-SCNC: 1.4 MMOL/L — SIGNIFICANT CHANGE UP (ref 0.7–2)
LYMPHOCYTES # BLD AUTO: 1.07 K/UL — SIGNIFICANT CHANGE UP (ref 1–3.3)
LYMPHOCYTES # BLD AUTO: 8.7 % — LOW (ref 13–44)
MCHC RBC-ENTMCNC: 27.6 PG — SIGNIFICANT CHANGE UP (ref 27–34)
MCHC RBC-ENTMCNC: 32.9 GM/DL — SIGNIFICANT CHANGE UP (ref 32–36)
MCV RBC AUTO: 84 FL — SIGNIFICANT CHANGE UP (ref 80–100)
MONOCYTES # BLD AUTO: 0.65 K/UL — SIGNIFICANT CHANGE UP (ref 0–0.9)
MONOCYTES NFR BLD AUTO: 5.3 % — SIGNIFICANT CHANGE UP (ref 2–14)
NEUTROPHILS # BLD AUTO: 10.54 K/UL — HIGH (ref 1.8–7.4)
NEUTROPHILS NFR BLD AUTO: 85.2 % — HIGH (ref 43–77)
NRBC # BLD: 0 /100 WBCS — SIGNIFICANT CHANGE UP (ref 0–0)
PLATELET # BLD AUTO: 282 K/UL — SIGNIFICANT CHANGE UP (ref 150–400)
POTASSIUM SERPL-MCNC: 4.2 MMOL/L — SIGNIFICANT CHANGE UP (ref 3.5–5.3)
POTASSIUM SERPL-SCNC: 4.2 MMOL/L — SIGNIFICANT CHANGE UP (ref 3.5–5.3)
RBC # BLD: 4.49 M/UL — SIGNIFICANT CHANGE UP (ref 3.8–5.2)
RBC # FLD: 14.1 % — SIGNIFICANT CHANGE UP (ref 10.3–14.5)
SODIUM SERPL-SCNC: 142 MMOL/L — SIGNIFICANT CHANGE UP (ref 135–145)
SPECIMEN SOURCE: SIGNIFICANT CHANGE UP
WBC # BLD: 12.35 K/UL — HIGH (ref 3.8–10.5)
WBC # FLD AUTO: 12.35 K/UL — HIGH (ref 3.8–10.5)

## 2022-10-09 PROCEDURE — 99233 SBSQ HOSP IP/OBS HIGH 50: CPT

## 2022-10-09 PROCEDURE — 99232 SBSQ HOSP IP/OBS MODERATE 35: CPT

## 2022-10-09 RX ORDER — INSULIN LISPRO 100/ML
4 VIAL (ML) SUBCUTANEOUS
Refills: 0 | Status: DISCONTINUED | OUTPATIENT
Start: 2022-10-09 | End: 2022-10-09

## 2022-10-09 RX ORDER — INSULIN GLARGINE 100 [IU]/ML
12 INJECTION, SOLUTION SUBCUTANEOUS AT BEDTIME
Refills: 0 | Status: DISCONTINUED | OUTPATIENT
Start: 2022-10-09 | End: 2022-10-10

## 2022-10-09 RX ORDER — INSULIN GLARGINE 100 [IU]/ML
15 INJECTION, SOLUTION SUBCUTANEOUS AT BEDTIME
Refills: 0 | Status: DISCONTINUED | OUTPATIENT
Start: 2022-10-09 | End: 2022-10-09

## 2022-10-09 RX ORDER — BUDESONIDE AND FORMOTEROL FUMARATE DIHYDRATE 160; 4.5 UG/1; UG/1
2 AEROSOL RESPIRATORY (INHALATION)
Refills: 0 | Status: DISCONTINUED | OUTPATIENT
Start: 2022-10-09 | End: 2022-10-10

## 2022-10-09 RX ORDER — INSULIN LISPRO 100/ML
2 VIAL (ML) SUBCUTANEOUS
Refills: 0 | Status: DISCONTINUED | OUTPATIENT
Start: 2022-10-09 | End: 2022-10-09

## 2022-10-09 RX ADMIN — ALBUTEROL 1.25 MILLIGRAM(S): 90 AEROSOL, METERED ORAL at 06:57

## 2022-10-09 RX ADMIN — ALBUTEROL 1.25 MILLIGRAM(S): 90 AEROSOL, METERED ORAL at 15:03

## 2022-10-09 RX ADMIN — Medication 1: at 08:09

## 2022-10-09 RX ADMIN — BUDESONIDE AND FORMOTEROL FUMARATE DIHYDRATE 2 PUFF(S): 160; 4.5 AEROSOL RESPIRATORY (INHALATION) at 21:26

## 2022-10-09 RX ADMIN — ALBUTEROL 1.25 MILLIGRAM(S): 90 AEROSOL, METERED ORAL at 21:27

## 2022-10-09 RX ADMIN — DONEPEZIL HYDROCHLORIDE 10 MILLIGRAM(S): 10 TABLET, FILM COATED ORAL at 21:16

## 2022-10-09 RX ADMIN — Medication 2 UNIT(S): at 17:12

## 2022-10-09 RX ADMIN — Medication 0.5 MILLIGRAM(S): at 06:57

## 2022-10-09 RX ADMIN — LOSARTAN POTASSIUM 50 MILLIGRAM(S): 100 TABLET, FILM COATED ORAL at 05:51

## 2022-10-09 RX ADMIN — Medication 1: at 12:47

## 2022-10-09 RX ADMIN — Medication 40 MILLIGRAM(S): at 05:50

## 2022-10-09 RX ADMIN — Medication 2 UNIT(S): at 12:46

## 2022-10-09 RX ADMIN — PANTOPRAZOLE SODIUM 40 MILLIGRAM(S): 20 TABLET, DELAYED RELEASE ORAL at 05:50

## 2022-10-09 RX ADMIN — Medication 3 MILLIGRAM(S): at 21:16

## 2022-10-09 RX ADMIN — INSULIN GLARGINE 12 UNIT(S): 100 INJECTION, SOLUTION SUBCUTANEOUS at 21:16

## 2022-10-09 RX ADMIN — ATORVASTATIN CALCIUM 20 MILLIGRAM(S): 80 TABLET, FILM COATED ORAL at 21:16

## 2022-10-10 ENCOUNTER — TRANSCRIPTION ENCOUNTER (OUTPATIENT)
Age: 77
End: 2022-10-10

## 2022-10-10 VITALS — OXYGEN SATURATION: 99 %

## 2022-10-10 LAB
GLUCOSE BLDC GLUCOMTR-MCNC: 169 MG/DL — HIGH (ref 70–99)
GLUCOSE BLDC GLUCOMTR-MCNC: 92 MG/DL — SIGNIFICANT CHANGE UP (ref 70–99)

## 2022-10-10 PROCEDURE — 99232 SBSQ HOSP IP/OBS MODERATE 35: CPT

## 2022-10-10 PROCEDURE — 99239 HOSP IP/OBS DSCHRG MGMT >30: CPT

## 2022-10-10 RX ORDER — METFORMIN HYDROCHLORIDE 850 MG/1
1 TABLET ORAL
Qty: 0 | Refills: 0 | DISCHARGE

## 2022-10-10 RX ORDER — ATORVASTATIN CALCIUM 80 MG/1
1 TABLET, FILM COATED ORAL
Qty: 0 | Refills: 0 | DISCHARGE

## 2022-10-10 RX ORDER — PANTOPRAZOLE SODIUM 20 MG/1
40 TABLET, DELAYED RELEASE ORAL
Refills: 0 | Status: DISCONTINUED | OUTPATIENT
Start: 2022-10-11 | End: 2022-10-10

## 2022-10-10 RX ORDER — METFORMIN HYDROCHLORIDE 850 MG/1
2 TABLET ORAL
Qty: 0 | Refills: 0 | DISCHARGE
Start: 2022-10-10

## 2022-10-10 RX ORDER — ALBUTEROL 90 UG/1
1 AEROSOL, METERED ORAL EVERY 6 HOURS
Refills: 0 | Status: DISCONTINUED | OUTPATIENT
Start: 2022-10-10 | End: 2022-10-10

## 2022-10-10 RX ORDER — ALBUTEROL 90 UG/1
1 AEROSOL, METERED ORAL EVERY 6 HOURS
Refills: 0 | Status: COMPLETED | OUTPATIENT
Start: 2022-10-10 | End: 2023-09-08

## 2022-10-10 RX ADMIN — ALBUTEROL 1.25 MILLIGRAM(S): 90 AEROSOL, METERED ORAL at 06:14

## 2022-10-10 RX ADMIN — BUDESONIDE AND FORMOTEROL FUMARATE DIHYDRATE 2 PUFF(S): 160; 4.5 AEROSOL RESPIRATORY (INHALATION) at 09:11

## 2022-10-10 RX ADMIN — PANTOPRAZOLE SODIUM 40 MILLIGRAM(S): 20 TABLET, DELAYED RELEASE ORAL at 05:58

## 2022-10-10 RX ADMIN — Medication 1: at 11:32

## 2022-10-10 RX ADMIN — ALBUTEROL 1.25 MILLIGRAM(S): 90 AEROSOL, METERED ORAL at 15:30

## 2022-10-10 RX ADMIN — LOSARTAN POTASSIUM 50 MILLIGRAM(S): 100 TABLET, FILM COATED ORAL at 05:58

## 2022-10-10 NOTE — DIETITIAN INITIAL EVALUATION ADULT - OTHER INFO
Patient reports good appetite. As per nursing flow sheets, patient has been consuming 51-75% of her meals but trending on the upper side. Currently tolerating consistent carbohydrate diet. A1C: 6.8 (10/9). Education provided on carbohydrate counting. Denies any N/V/D/C, no known food allergies/intolerances. Patient reports last BM to be yesterday (10/9). Recommend continue current nutrition plan of care.

## 2022-10-10 NOTE — PROGRESS NOTE ADULT - ASSESSMENT
76 yo f with pmh/o asthma, htn, hld, dm2 presents with sob, fever, chills  2 days.    Acute hypoxic resp failure due to asthma exacerbation due to enterovirus   mild leukocytosis  elevation in lactate - suspect due to hypoperfusion  check procalcitonin   cont levaquin for now but low suspicion for bacterial pna  solumedrol 40 q12 x 4 doses then reassess  o2 support prn  duonebs q8 hr  ppi for gi ppx    concern for afib on EMS strip  strip poor quality, doubt afib, ekg sinus in ED  troponin negative x2  telemonitor showed NSR   check echo  cardiology consult      Hypokalemia  - repleted    MILD REJI - likely due to viral infection  Creatinine, Serum: 1.22 mg/dL (04.28.22 @ 01:26)  Creatinine, Serum: 1.36 mg/dL (10.07.22 @ 18:15)  monitor  will not hold losartan  labs in am    Soft tissue swelling around lower thoraic/lumbar area  swelling happened after a fall 8 months ago, went away initially but returned yesterday  check T/L spine xray     DM2-  Steriod induced hyperglycemia  ON METFORMIN- will hold  ISS, Accu-Cheks  lantus and admelog and ISS      HTN- c/w losartan    HLD- c/w statins    vte ppx- SCD, early ambulation    GOC - full code    updated pt's daughter, Efren on the phone 10/8        
78 yo f with pmh/o asthma, htn, hld, dm2 presents with sob, fever, chills  2 days.    Acute hypoxic resp failure due to asthma exacerbation due to enterovirus   elevation in lactate - suspect due to hypoperfusion- resolved   mild leukocytosis - suspect steroid induced   procalcitonin mildly elevated, cont with empiric abx levaquin   tapered to po prednisone 40mg daily   saturating 98% on RA  albuterol q8   symbicort  pulmicort   ppi for gi ppx  appreciate pulm consult    concern for afib on EMS strip  strip poor quality, doubt afib, ekg sinus in ED  troponin negative x2  telemonitor showed pt remains in NSR   echo showed LV not well visualized, LV function appears normal. moderately increased LV wall thickness   cardiology consult appreciated: pt would benefit from outpatient cardiac event monitor        Hypokalemia  - repleted    MILD REJI - likely due to viral infection - resolving   Creatinine, Serum: 1.22 mg/dL (04.28.22 @ 01:26)  Creatinine, Serum: 1.36 mg/dL (10.07.22 @ 18:15)  cont with losartan  labs in am    Soft tissue swelling around lower thoraic/lumbar area  swelling happened after a fall 8 months ago, went away initially but returned yesterday  T/L spine xray showed degenerative changes     DM2-  Steriod induced hyperglycemia  ON METFORMIN at home - will hold  ISS, Accu-Cheks  lantus and admelog and ISS      HTN- c/w losartan    HLD- c/w statins    vte ppx- SCD, early ambulation    GOC - full code    updated pt's daughter, Efren on the phone 10/10    dispo: home today        
Assessment:  Patrica Otto is a 77 year old woman with past medical history of Asthma, Hypertension, Hyperlipidemia and Type II Diabetes mellitus presents with shortness of breath and fever, found to have entero/rhinovirus.    Cardiology consulted due to concern for possible atrial fibrillation during EMS trip per primary team. Review of paper chart with short rhythm strip with poor baseline and irregular rhythm, cannot entirely rule out underlying atrial fibrillation. ECG here consistent with sinus rhythm and nonspecific ST abnormalities. Troponins negative x 3, no signs of acute coronary syndrome. CTPA negative for pulmonary embolism.    Echo consistent with normal LV and RV systolic function.    Recommendations:  [] Telemetry reviewed and no signs of atrial fibrillation while in hospital. Patient would benefit from outpatient cardiac event monitor to evaluate if underlying atrial fibrillation.  [] Entero/rhinovirus: Management per primary team    Discussed with patient and daughter (Efren; [phone]) for outpatient cardiac follow-up for cardiac event monitoring.     Ada Sandoval MD  Cardiology        
78 yo f with pmh/o asthma, htn, hld, dm2 presents with sob, fever, chills  2 days.    Acute hypoxic resp failure due to asthma exacerbation due to enterovirus   elevation in lactate - suspect due to hypoperfusion- resolved   mild leukocytosis - suspect steroid induced   procalcitonin mildly elevated, cont with empiric abx levaquin for now  taper to po prednisone 40mg daily tomorrow   saturating 97% on RA  albuterol q8   pulmicort   ppi for gi ppx    concern for afib on EMS strip  strip poor quality, doubt afib, ekg sinus in ED  troponin negative x2  telemonitor showed pt remains in NSR   echo showed LV not well visualized, LV function appears normal. moderately increased LV wall thickness   cardiology consult appreciated: pt would benefit from outpatient cardiac event monitor        Hypokalemia  - repleted    MILD REJI - likely due to viral infection - resolving   Creatinine, Serum: 1.22 mg/dL (04.28.22 @ 01:26)  Creatinine, Serum: 1.36 mg/dL (10.07.22 @ 18:15)  cont with losartan  labs in am    Soft tissue swelling around lower thoraic/lumbar area  swelling happened after a fall 8 months ago, went away initially but returned yesterday  T/L spine xray showed degenerative changes     DM2-  Steriod induced hyperglycemia  ON METFORMIN at home - will hold  ISS, Accu-Cheks  lantus and admelog and ISS      HTN- c/w losartan    HLD- c/w statins    vte ppx- SCD, early ambulation    GOC - full code    updated pt's daughter, Efren on the phone 10/9    dispo: likely home tomorrow         
Assessment:  Patrica Otto is a 77 year old woman with past medical history of Asthma, Hypertension, Hyperlipidemia and Type II Diabetes mellitus presents with shortness of breath and fever, found to have entero/rhinovirus.    Cardiology consulted due to concern for possible atrial fibrillation during EMS trip per primary team. Review of paper chart with short rhythm strip with poor baseline and irregular rhythm, cannot entirely rule out underlying atrial fibrillation. ECG here consistent with sinus rhythm and nonspecific ST abnormalities. Troponins negative x 3, no signs of acute coronary syndrome. CTPA negative for pulmonary embolism.    Echo consistent with normal LV and RV systolic function.    Recommendations:  [] Recommend to continue to monitor on telemetry, no signs of atrial fibrillation while in hospital. Patient would benefit from outpatient cardiac event monitor to evaluate if underlying atrial fibrillation.  [] Entero/rhinovirus: Management per primary team    Discussed with patient and daughter (Efren; [phone]) for outpatient cardiac follow-up for cardiac event monitoring.     Ada Sandoval MD  Cardiology

## 2022-10-10 NOTE — DIETITIAN INITIAL EVALUATION ADULT - PERTINENT LABORATORY DATA
10-09    142  |  107  |  25<H>  ----------------------------<  198<H>  4.2   |  26  |  1.07    Ca    9.2      09 Oct 2022 05:40    POCT Blood Glucose.: 92 mg/dL (10-10-22 @ 07:55)  A1C with Estimated Average Glucose Result: 6.8 % (10-09-22 @ 05:40)  A1C with Estimated Average Glucose Result: 6.7 % (10-08-22 @ 06:30)

## 2022-10-10 NOTE — DIETITIAN INITIAL EVALUATION ADULT - ORAL INTAKE PTA/DIET HISTORY
Patient reports diet history PTA: 3 meals a day. Breakfast: Toast w/ Jam or Butter. Lunch: Grilled Cheese Sandwhich. Dinner: Protein, Starch, & a Vegetable.

## 2022-10-10 NOTE — CONSULT NOTE ADULT - ASSESSMENT
78 y/o female with history of asthma, HTN, HLD, DM2 admitted with acute exacerbation of her asthma in the setting of entero-virus infection.     Assessment:  1. Acute asthma exacerbation  2. Enterovirus infection   3. Hypertension  4. Hyperlipidemia  5. Diabetes Mellitus type 2    Plan:  - Appears comfortable on [place] oxygen, not in distress, feels close to baseline breathing. Able to ambulate with out difficulty.   - Tapering dose of steroids upon discharge  - Cont. Symbicort while inpatient with albuterol as needed  - Can resume home inhalers (Brovana and Budesonide) upon discharge  - States follows pulmonologist as outpatient  - Monitor fingerstick glucose while on steroids  - No objection from pulmonary perspective to discharge  - Can follow with primary pulmonologist upon discharge  - Discussed with Dr. Rodriguez
Assessment:  Patrica Otto is a 77 year old woman with past medical history of Asthma, Hypertension, Hyperlipidemia and Type II Diabetes mellitus presents with shortness of breath and fever, found to have entero/rhinovirus.    Cardiology consulted due to concern for possible atrial fibrillation during EMS trip per primary team. Review of paper chart with short rhythm strip with poor baseline and irregular rhythm, cannot entirely rule out underlying atrial fibrillation. ECG here consistent with sinus rhythm and nonspecific ST abnormalities. Troponins negative x 3, no signs of acute coronary syndrome. CTPA negative for pulmonary embolism.    Recommendations:  [] Recommend to continue to monitor on telemetry. Patient would benefit from outpatient cardiac event monitor to evaluate if underlying atrial fibrillation. Follow up echo.   [] Entero/rhinovirus: Management per primary team    Discussed with Dr. Rodriguez. We will continue to follow along. Will discuss with patient's family    Ada Sandoval MD  Cardiology

## 2022-10-10 NOTE — CONSULT NOTE ADULT - SUBJECTIVE AND OBJECTIVE BOX
PATRICA OTTO  [id number]      HPI:    Patrica Otto is a 77 year old woman with past medical history of Asthma, Hypertension, Hyperlipidemia and Type II Diabetes mellitus presents with shortness of breath and fever.    The patient reports she was having shortness of breath at rest which prompted her daughter to call EMS. She denies chest discomfort or palpitations. Denies any prior history of atrial arrhythmia.         ALLERGIES:  Bactrim (Short breath)  penicillins (Rash)  Zyflo (Stomach Upset; Vomiting)      PAST MEDICAL & SURGICAL HISTORY:  Asthma  Hypertension  Hyperlipidemia  Type II Diabetes mellitus   Pneumonia   Diverticulitis of colon (without mention of hemorrhage)  History of cholecystectomy      CURRENT MEDICATIONS:  acetaminophen     Tablet .. 650 milliGRAM(s) Oral every 6 hours PRN  ALBUTerol   0.042% 1.25 milliGRAM(s) Nebulizer every 8 hours  aluminum hydroxide/magnesium hydroxide/simethicone Suspension 30 milliLiter(s) Oral every 4 hours PRN  atorvastatin 20 milliGRAM(s) Oral at bedtime  dextrose 5%. 1000 milliLiter(s) IV Continuous <Continuous>  dextrose 5%. 1000 milliLiter(s) IV Continuous <Continuous>  dextrose 50% Injectable 25 Gram(s) IV Push once  dextrose 50% Injectable 12.5 Gram(s) IV Push once  dextrose 50% Injectable 25 Gram(s) IV Push once  dextrose Oral Gel 15 Gram(s) Oral once PRN  donepezil 10 milliGRAM(s) Oral at bedtime  glucagon  Injectable 1 milliGRAM(s) IntraMuscular once  insulin glargine Injectable (LANTUS) 12 Unit(s) SubCutaneous at bedtime  insulin lispro (ADMELOG) corrective regimen sliding scale   SubCutaneous three times a day before meals  insulin lispro (ADMELOG) corrective regimen sliding scale   SubCutaneous at bedtime  insulin lispro Injectable (ADMELOG) 2 Unit(s) SubCutaneous three times a day before meals  levoFLOXacin IVPB 500 milliGRAM(s) IV Intermittent every 24 hours  losartan 50 milliGRAM(s) Oral daily  melatonin 3 milliGRAM(s) Oral at bedtime PRN  methylPREDNISolone sodium succinate Injectable 40 milliGRAM(s) IV Push every 12 hours  ondansetron Injectable 4 milliGRAM(s) IV Push every 8 hours PRN  pantoprazole  Injectable 40 milliGRAM(s) IV Push every 24 hours  sodium chloride 0.45%. 1000 milliLiter(s) IV Continuous <Continuous>      FAMILY HISTORY:  Family history of diabetes mellitus (DM) (Father)  FH: HTN (hypertension) (Mother)        ROS:  All 10 systems reviewed and positives noted in HPI    OBJECTIVE:    VITAL SIGNS:  Vital Signs Last 24 Hrs  T(C): 36.8 (08 Oct 2022 05:09), Max: 37.6 (07 Oct 2022 17:41)  T(F): 98.2 (08 Oct 2022 05:09), Max: 99.7 (07 Oct 2022 17:41)  HR: 72 (08 Oct 2022 08:10) (72 - 88)  BP: 135/69 (08 Oct 2022 05:09) (103/79 - 151/64)  BP(mean): --  RR: 16 (08 Oct 2022 05:09) (16 - 22)  SpO2: 97% (08 Oct 2022 08:10) (82% - 100%)    Parameters below as of 08 Oct 2022 08:10  Patient On (Oxygen Delivery Method): room air        PHYSICAL EXAM:  General: elderly woman, no acute distress  HEENT: sclera anicteric  Neck: supple  CVS: JVP ~ 7 cm H20, RRR, s1, s2, no murmurs/rubs/gallops  Chest: unlabored respirations, coarse breath sounds/wheezing  Abdomen: obese  Extremities: no lower extremity edema b/l  Neuro: awake, alert & oriented x 3  Psych: normal affect      LABS:                        12.6   7.06  )-----------( 251      ( 08 Oct 2022 06:30 )             37.9     10-08    140  |  107  |  18  ----------------------------<  245<H>  4.4   |  24  |  0.99    Ca    9.0      08 Oct 2022 06:30  Phos  3.2     10-08  Mg     1.6     10-08    TPro  6.2  /  Alb  3.2<L>  /  TBili  1.7<H>  /  DBili  x   /  AST  16  /  ALT  23  /  AlkPhos  103  10-08        PT/INR - ( 07 Oct 2022 18:15 )   PT: 16.1 sec;   INR: 1.38 ratio         PTT - ( 07 Oct 2022 18:15 )  PTT:29.6 sec        ECG (10/7/22): sinus rhythm, left axis deviation, nonspecific ST abnormalities     CTPA (10/7/22):  No pulmonary embolism.    
Initial HPI on admission:  HPI:  76 yo f with asthma, htn, hld, dm2 presents with sob, fever, chills  2 days.    history obtained form daughter who states that, day before yesterday patient was c/o some malaise and mild sob, yesterday evening SOB worsened and she developed chills and low grade temp. of 100.7f , so dtr called EMS.   symptoms associated with some dizziness, no cp, no palpitations, no n/v, no prior complaints.     per EMS ekg strip - concern for afib, hr 150's,  ekg in ED shows sinus at 82   pt O2 sat was 80s on RA on arrival to ED, she was given 2 Duonebs, O2 sat improved to  100%  in ED she received Rocephin Duoneb and dexa     on admission she is feeling better, and is in no distress.  but noted to be wheezing +    (07 Oct 2022 21:18)      BRIEF HOSPITAL COURSE: Comfortable, not in distress. States feels close to baseline breathing. Denies any chest pain, fevers, shortness of breath.    PAST MEDICAL & SURGICAL HISTORY:  History of Hypertension      Hypercholesterolemia      Pneumonia  2011      Diverticulitis of colon (without mention of hemorrhage)  2011      Asthma  well-controlled on medication, no recent hospitalizations      Vertigo  hospitalized,12/2019; underwent cardiac &amp; neuro eval      COPD with asthma      Cognitive dysfunction  forgetfulness, takes donepezil      Type 2 diabetes mellitus  no current medications, Z1C 10/2020 = 6.9      Uterine polyp      Hypogammaglobulinemia  no treatment currently, noted in chart 8/2012      History of Dilatation and Curettage      colostomy 1/2011  reversal 8/11 - perforated colon, diverticulitis      History of cholecystectomy  2012 with hernia repair        Allergies    Bactrim (Short breath)  penicillins (Rash)  Zyflo (Stomach Upset; Vomiting)    Intolerances      FAMILY HISTORY:  Family history of diabetes mellitus (DM) (Father)    FH: HTN (hypertension) (Mother)    Social History:  no smoking or etoh use (07 Oct 2022 21:18)      Review of Systems:  Negative except for above    Medications:  acetaminophen     Tablet .. 650 milliGRAM(s) Oral every 6 hours PRN Temp greater or equal to 38C (100.4F), Mild Pain (1 - 3)  ALBUTerol    90 MICROgram(s) HFA Inhaler 1 Puff(s) Inhalation every 6 hours PRN Shortness of Breath and/or Wheezing  ALBUTerol   0.042% 1.25 milliGRAM(s) Nebulizer every 8 hours  aluminum hydroxide/magnesium hydroxide/simethicone Suspension 30 milliLiter(s) Oral every 4 hours PRN Dyspepsia  atorvastatin 20 milliGRAM(s) Oral at bedtime  budesonide 160 MICROgram(s)/formoterol 4.5 MICROgram(s) Inhaler 2 Puff(s) Inhalation two times a day  dextrose 5%. 1000 milliLiter(s) (50 mL/Hr) IV Continuous <Continuous>  dextrose 5%. 1000 milliLiter(s) (100 mL/Hr) IV Continuous <Continuous>  dextrose 50% Injectable 25 Gram(s) IV Push once  dextrose 50% Injectable 12.5 Gram(s) IV Push once  dextrose 50% Injectable 25 Gram(s) IV Push once  dextrose Oral Gel 15 Gram(s) Oral once PRN Blood Glucose LESS THAN 70 milliGRAM(s)/deciliter  donepezil 10 milliGRAM(s) Oral at bedtime  glucagon  Injectable 1 milliGRAM(s) IntraMuscular once  guaiFENesin Oral Liquid (Sugar-Free) 200 milliGRAM(s) Oral every 6 hours PRN Cough  insulin lispro (ADMELOG) corrective regimen sliding scale   SubCutaneous three times a day before meals  insulin lispro (ADMELOG) corrective regimen sliding scale   SubCutaneous at bedtime  levoFLOXacin IVPB 500 milliGRAM(s) IV Intermittent every 24 hours  losartan 50 milliGRAM(s) Oral daily  melatonin 3 milliGRAM(s) Oral at bedtime PRN Insomnia  ondansetron Injectable 4 milliGRAM(s) IV Push every 8 hours PRN Nausea and/or Vomiting    MEDICATIONS  (STANDING):  ALBUTerol   0.042% 1.25 milliGRAM(s) Nebulizer every 8 hours  atorvastatin 20 milliGRAM(s) Oral at bedtime  budesonide 160 MICROgram(s)/formoterol 4.5 MICROgram(s) Inhaler 2 Puff(s) Inhalation two times a day  dextrose 5%. 1000 milliLiter(s) (50 mL/Hr) IV Continuous <Continuous>  dextrose 5%. 1000 milliLiter(s) (100 mL/Hr) IV Continuous <Continuous>  dextrose 50% Injectable 25 Gram(s) IV Push once  dextrose 50% Injectable 12.5 Gram(s) IV Push once  dextrose 50% Injectable 25 Gram(s) IV Push once  donepezil 10 milliGRAM(s) Oral at bedtime  glucagon  Injectable 1 milliGRAM(s) IntraMuscular once  insulin lispro (ADMELOG) corrective regimen sliding scale   SubCutaneous three times a day before meals  insulin lispro (ADMELOG) corrective regimen sliding scale   SubCutaneous at bedtime  levoFLOXacin IVPB 500 milliGRAM(s) IV Intermittent every 24 hours  losartan 50 milliGRAM(s) Oral daily    MEDICATIONS  (PRN):  acetaminophen     Tablet .. 650 milliGRAM(s) Oral every 6 hours PRN Temp greater or equal to 38C (100.4F), Mild Pain (1 - 3)  ALBUTerol    90 MICROgram(s) HFA Inhaler 1 Puff(s) Inhalation every 6 hours PRN Shortness of Breath and/or Wheezing  aluminum hydroxide/magnesium hydroxide/simethicone Suspension 30 milliLiter(s) Oral every 4 hours PRN Dyspepsia  dextrose Oral Gel 15 Gram(s) Oral once PRN Blood Glucose LESS THAN 70 milliGRAM(s)/deciliter  guaiFENesin Oral Liquid (Sugar-Free) 200 milliGRAM(s) Oral every 6 hours PRN Cough  melatonin 3 milliGRAM(s) Oral at bedtime PRN Insomnia  ondansetron Injectable 4 milliGRAM(s) IV Push every 8 hours PRN Nausea and/or Vomiting      Antibiotics History  cefTRIAXone   IVPB 1000 milliGRAM(s) IV Intermittent once, 10-07-22 @ 19:04, Stop order after: 1 Doses  levoFLOXacin IVPB 500 milliGRAM(s) IV Intermittent every 24 hours, 10-08-22 @ 00:00      Heme Medications       GI Medications  aluminum hydroxide/magnesium hydroxide/simethicone Suspension 30 milliLiter(s) Oral every 4 hours, 10-07-22 @ 21:12, Routine PRN        Home Medications:  Last Order Reconciliation Date: 10-07-22 @ 21:18 (Admission Reconciliation)  albuterol 0.63 mg/3 mL (0.021%) inhalation solution: 3 milliliter(s) inhaled 3 times a day, As Needed (02-25-19 @ 16:47)  albuterol 2.5 mg/3 mL (0.083%) inhalation solution: 3 milliliter(s) inhaled every 6 hours, As Needed (10-23-20 @ 10:22)  aspirin 81 mg oral delayed release tablet: 1 tab(s) orally once a day (10-21-20 @ 13:02)  atorvastatin 20 mg oral tablet: 1 tab(s) orally once a day (02-04-22 @ 13:19)  atorvastatin 40 mg oral tablet: 1 tab(s) orally once a day (02-04-22 @ 13:19)  benzonatate 100 mg oral capsule: 2 cap(s) orally 3 times a day (12-19-19 @ 01:45)  Biaxin 250 mg oral tablet: 1 tab(s) orally 3 times a day  (03-06-12 @ 14:40)  Brovana 15 mcg/2 mL inhalation solution: 2 milliliter(s) inhaled 2 times a day (10-23-20 @ 10:22)  budesonide 0.5 mg/2 mL inhalation suspension: 2 milliliter(s) inhaled 2 times a day (05-12-14 @ 12:54)  budesonide 0.5 mg/2 mL inhalation suspension:  inhaled twice daily (10-23-20 @ 10:22)  Cleocin HCl 150 mg oral capsule: 3 cap(s) orally 3 times a day  (02-04-22 @ 13:19)  Crestor 10 mg oral tablet: 1 tab(s) orally once a day (at bedtime)  (12-02-13 @ 21:31)  Diovan 80 mg oral tablet: 1 tab(s) orally once a day . Hold if SBP less than 100 and DBP less than 60 (03-06-12 @ 14:40)  donepezil 10 mg oral tablet: 1 tab(s) orally once a day (10-23-20 @ 10:22)  ergocalciferol: 01184 unit(s) orally once a week (02-25-19 @ 16:47)  Flonase 0.05 mg/inh nasal spray: 2 spray(s) nasal 2 times a day  (12-02-13 @ 21:31)  guaiFENesin 100 mg/5 mL oral liquid: 10 milliliter(s) orally every 6 hours, As needed, Cough (10-10-22 @ 11:42)  guaifenesin-dextromethorphan 100 mg-10 mg/5 mL oral liquid: 10 milliliter(s) orally 4 times a day (10-21-20 @ 13:02)  Janumet 50 mg-1000 mg oral tablet: 1 tab(s) orally 2 times a day  (12-19-19 @ 01:45)  Lasix 40 mg oral tablet: 1  orally once a day  (12-02-13 @ 19:46)  levoFLOXacin 500 mg oral tablet: 1 tab(s) orally once a day  (10-10-22 @ 11:42)  Lipitor 20 mg oral tablet: 1 tab(s) orally once a day (10-07-22 @ 21:17)  losartan 50 mg oral tablet: 1 tab(s) orally once a day (10-23-20 @ 10:22)  meclizine 12.5 mg oral tablet: 1 tab(s) orally 4 times a day, As needed, Dizziness (10-21-20 @ 13:02)  metFORMIN 500 mg oral tablet: 1 tab(s) orally 2 times a day (with meals) (02-25-19 @ 16:47)  metFORMIN 500 mg oral tablet: 1 tab(s) orally once a day (10-10-22 @ 11:40)  metFORMIN 500 mg oral tablet: 2 tab(s) orally 2 times a day (10-10-22 @ 11:40)  montelukast 10 mg oral tablet: 1 tab(s) orally once a day (12-19-19 @ 01:45)  Motrin 600 mg oral tablet: 1 tab(s) orally every 6 hours, As Needed (10-23-20 @ 12:29)  Multiple Vitamins oral capsule: 1 cap(s) orally once a day (10-23-20 @ 10:22)  Prandin 2 mg oral tablet: 1  orally 3 times a day  (12-02-13 @ 19:46)  predniSONE 10 mg oral tablet:   6 tabs po daily for 3 days then  4 tabs daily for 3 days the  3 tabs daily for 3 days then  2 tabs daily for 3 daysthe  1 tab daily for 3 days (02-25-19 @ 16:47)  predniSONE 10 mg oral tablet: 4 tab(s) orally once 3 daydays 3/3-3/5, 3 tabsX 3 days 3/6-3/8, 2 tabs X 3 days 3/9-3/11,1 tab3/12- (12-18-19 @ 20:17)  predniSONE 20 mg oral tablet: 1 tab(s) orally 2 times a day  (03-06-12 @ 14:40)  predniSONE 20 mg oral tablet: 2 tab(s) orally once a day x 3 days  1 tab(s) orally once a day x 3 days (10-10-22 @ 11:46)  PreserVision AREDS 2 oral tablet, chewable: 1 tab(s) orally 2 times a day (02-04-22 @ 13:20)  ProAir HFA 90 mcg/inh inhalation aerosol: 2 puff(s) inhaled every 6 hours, As Needed (10-23-20 @ 10:22)  Probiotic Formula oral capsule: 1 cap(s) orally once a day (10-23-20 @ 10:22)  Pulmicort Respules 1 mg/2 mL inhalation suspension: via nebulizer once a day (12-02-13 @ 21:31)  Turmeric: 400 milligram(s) orally once a day (10-23-20 @ 10:22)  Tylenol 325 mg oral capsule: 2 cap(s) orally every 6 hours, As Needed (10-23-20 @ 12:29)  Vitamin C 1000 mg oral tablet: 1 tab(s) orally once a day (10-23-20 @ 12:29)  Vitamin C 1000 mg oral tablet: 1 tab(s) orally once a day (02-04-22 @ 13:21)  Zetonna 37 mcg/inh nasal aerosol: 1 spray(s) nasal once a day (02-25-19 @ 16:47)      LABS:                        12.4   12.35 )-----------( 282      ( 09 Oct 2022 05:40 )             37.7     10-09    142  |  107  |  25<H>  ----------------------------<  198<H>  4.2   |  26  |  1.07    Ca    9.2      09 Oct 2022 05:40                  Procalcitonin, Serum: 0.12 ng/mL (10-08-22 @ 13:28)  Procalcitonin, Serum: 0.12 ng/mL (10-08-22 @ 06:30)          CULTURES: (if applicable)    Culture - Urine (collected 10-07-22 @ 20:15)  Source: Clean Catch Clean Catch (Midstream)  Final Report (10-09-22 @ 15:01):    <10,000 CFU/mL Normal Urogenital Ginny    Culture - Blood (collected 10-07-22 @ 18:15)  Source: .Blood Blood-Peripheral  Preliminary Report (10-09-22 @ 01:03):    No growth to date.    Culture - Blood (collected 10-07-22 @ 18:15)  Source: .Blood Blood-Peripheral  Preliminary Report (10-09-22 @ 01:03):    No growth to date.      Rapid RVP Result: Detected (10-07-22 @ 18:25)        CAPILLARY BLOOD GLUCOSE      POCT Blood Glucose.: 169 mg/dL (10 Oct 2022 11:30)      RADIOLOGY  CXR:      CT:    ECHO:      VITALS:  T(C): 36.6 (10-10-22 @ 05:26), Max: 36.8 (10-09-22 @ 16:11)  T(F): 97.9 (10-10-22 @ 05:26), Max: 98.2 (10-09-22 @ 16:11)  HR: 79 (10-10-22 @ 09:15) (62 - 80)  BP: 154/79 (10-10-22 @ 05:26) (120/63 - 154/79)  BP(mean): --  ABP: --  ABP(mean): --  RR: 15 (10-10-22 @ 05:26) (15 - 17)  SpO2: 98% (10-10-22 @ 09:15) (96% - 99%)  CVP(mm Hg): --  CVP(cm H2O): --    Ins and Outs     10-09-22 @ 07:01  -  10-10-22 @ 07:00  --------------------------------------------------------  IN: 980 mL / OUT: 0 mL / NET: 980 mL        Height (cm): 167.6 (10-08-22 @ 03:33)  Weight (kg): 85.1 (10-08-22 @ 03:33)  BMI (kg/m2): 30.3 (10-08-22 @ 03:33)        I&O's Detail    09 Oct 2022 07:01  -  10 Oct 2022 07:00  --------------------------------------------------------  IN:    IV PiggyBack: 100 mL    Oral Fluid: 880 mL  Total IN: 980 mL    OUT:  Total OUT: 0 mL    Total NET: 980 mL          Physical Examination:  GENERAL:               Alert, Oriented, No acute distress.    HEENT:                    Pupils equal, reactive to light.  Symmetric. No JVD, Moist MM  PULM:                     Bilateral air entry,  No Rales, No Rhonchi, Minimal Wheezing  CVS:                         S1, S2,  No Murmur  ABD:                        Soft, nondistended, nontender, normoactive bowel sounds,   EXT:                         No edema, nontender, No Cyanosis or Clubbing   Vascular:                Warm Extremities, Normal Capillary refill, Normal Distal Pulses  SKIN:                       Warm and well perfused, no rashes noted.   NEURO:                  Alert, oriented, interactive, nonfocal, follows commands  PSYC:                      Calm, + Insight.

## 2022-10-10 NOTE — DIETITIAN INITIAL EVALUATION ADULT - FLUID ACCUMULATION
Nutrition physical assessment not warranted - no overt visual signs of muscle depletion or subcutaneous fat loss

## 2022-10-10 NOTE — DISCHARGE NOTE PROVIDER - NSDCMRMEDTOKEN_GEN_ALL_CORE_FT
albuterol 2.5 mg/3 mL (0.083%) inhalation solution: 3 milliliter(s) inhaled every 6 hours, As Needed  atorvastatin 40 mg oral tablet: 1 tab(s) orally once a day  Brovana 15 mcg/2 mL inhalation solution: 2 milliliter(s) inhaled 2 times a day  budesonide 0.5 mg/2 mL inhalation suspension:  inhaled twice daily  donepezil 10 mg oral tablet: 1 tab(s) orally once a day  Green Foods Complex supplement: 1 tab(s) orally once a day  guaiFENesin 100 mg/5 mL oral liquid: 10 milliliter(s) orally every 6 hours, As needed, Cough  levoFLOXacin 500 mg oral tablet: 1 tab(s) orally once a day   Lipitor 20 mg oral tablet: 1 tab(s) orally once a day  losartan 50 mg oral tablet: 1 tab(s) orally once a day  metFORMIN 500 mg oral tablet: 2 tab(s) orally 2 times a day  Motrin 600 mg oral tablet: 1 tab(s) orally every 6 hours, As Needed  Multiple Vitamins oral capsule: 1 cap(s) orally once a day  predniSONE 20 mg oral tablet: 2 tab(s) orally once a day x 3 days  1 tab(s) orally once a day x 3 days  PreserVision AREDS 2 oral tablet, chewable: 1 tab(s) orally 2 times a day  ProAir HFA 90 mcg/inh inhalation aerosol: 2 puff(s) inhaled every 6 hours, As Needed  Probiotic Formula oral capsule: 1 cap(s) orally once a day  Turmeric: 400 milligram(s) orally once a day  Tylenol 325 mg oral capsule: 2 cap(s) orally every 6 hours, As Needed  Vitamin C 1000 mg oral tablet: 1 tab(s) orally once a day   albuterol 2.5 mg/3 mL (0.083%) inhalation solution: 3 milliliter(s) inhaled every 6 hours, As Needed  atorvastatin 40 mg oral tablet: 1 tab(s) orally once a day  Brovana 15 mcg/2 mL inhalation solution: 2 milliliter(s) inhaled 2 times a day  budesonide 0.5 mg/2 mL inhalation suspension:  inhaled twice daily  donepezil 10 mg oral tablet: 1 tab(s) orally once a day  Green Foods Complex supplement: 1 tab(s) orally once a day  guaiFENesin 100 mg/5 mL oral liquid: 10 milliliter(s) orally every 6 hours, As needed, Cough  levoFLOXacin 500 mg oral tablet: 1 tab(s) orally once a day   losartan 50 mg oral tablet: 1 tab(s) orally once a day  metFORMIN 500 mg oral tablet: 2 tab(s) orally 2 times a day  Motrin 600 mg oral tablet: 1 tab(s) orally every 6 hours, As Needed  Multiple Vitamins oral capsule: 1 cap(s) orally once a day  predniSONE 20 mg oral tablet: 2 tab(s) orally once a day x 3 days  1 tab(s) orally once a day x 3 days  PreserVision AREDS 2 oral tablet, chewable: 1 tab(s) orally 2 times a day  ProAir HFA 90 mcg/inh inhalation aerosol: 2 puff(s) inhaled every 6 hours, As Needed  Probiotic Formula oral capsule: 1 cap(s) orally once a day  Turmeric: 400 milligram(s) orally once a day  Tylenol 325 mg oral capsule: 2 cap(s) orally every 6 hours, As Needed  Vitamin C 1000 mg oral tablet: 1 tab(s) orally once a day

## 2022-10-10 NOTE — DISCHARGE NOTE NURSING/CASE MANAGEMENT/SOCIAL WORK - NSDCPEFALRISK_GEN_ALL_CORE
For information on Fall & Injury Prevention, visit: [email or web address] OR  [email or web address] OR  https://www.cdc.gov/steadi/patient.html

## 2022-10-10 NOTE — DISCHARGE NOTE PROVIDER - NSDCFUSCHEDAPPT_GEN_ALL_CORE_FT
[facility]  OBGYNGEN [address]  Scheduled Appointment: 10/31/2022    Danette Oliveira  [facility]  OBGYNGEN [address]  Scheduled Appointment: 10/31/2022    Harmeet Mathews  [facility]  Neuro [facility]  Scheduled Appointment: 11/17/2022    Harmeet Diop  [facility]  [address]  Scheduled Appointment: 12/15/2022

## 2022-10-10 NOTE — DISCHARGE NOTE PROVIDER - PROVIDER TOKENS
PROVIDER:[TOKEN:[[id number]]],PROVIDER:[TOKEN:[[id number]]],PROVIDER:[TOKEN:[[id number]]],FREE:[LAST:[Dr. Maciel],FIRST:[[place]],PHONE:[(   )    -],FAX:[(   )    -]]

## 2022-10-10 NOTE — DISCHARGE NOTE PROVIDER - HOSPITAL COURSE
76 yo f with pmh/o asthma, htn, hld, dm2 presents with sob, fever, chills  2 days.    Acute hypoxic resp failure due to asthma exacerbation due to enterovirus   elevation in lactate - suspect due to hypoperfusion- resolved   mild leukocytosis - suspect steroid induced   procalcitonin mildly elevated, cont with empiric abx levaquin, last day 10/11  taper to po prednisone 40mg daily for 3 days and then prednisone 20mg for 3 days and then stop  saturating 98% on RA now      empiric antibiotics: levaquin last day 10/11     discharge provider: Gatito Rodriguez [phone] please call with any questions     PMd Dr. Diop- notified

## 2022-10-10 NOTE — PROGRESS NOTE ADULT - SUBJECTIVE AND OBJECTIVE BOX
Patient is a 77y old  Female who presents with a chief complaint of sob (08 Oct 2022 11:53)      Subjective and overnight events:  Patient seen and examined at bedside. reports feeling much better today. no fever, chills, sob, cp, abd pain.     ALLERGIES:  Bactrim (Short breath)  penicillins (Rash)  Zyflo (Stomach Upset; Vomiting)    MEDICATIONS  (STANDING):  ALBUTerol   0.042% 1.25 milliGRAM(s) Nebulizer every 8 hours  atorvastatin 20 milliGRAM(s) Oral at bedtime  buDESOnide    Inhalation Suspension 0.5 milliGRAM(s) Inhalation two times a day  dextrose 5%. 1000 milliLiter(s) (50 mL/Hr) IV Continuous <Continuous>  dextrose 5%. 1000 milliLiter(s) (100 mL/Hr) IV Continuous <Continuous>  dextrose 50% Injectable 25 Gram(s) IV Push once  dextrose 50% Injectable 12.5 Gram(s) IV Push once  dextrose 50% Injectable 25 Gram(s) IV Push once  donepezil 10 milliGRAM(s) Oral at bedtime  glucagon  Injectable 1 milliGRAM(s) IntraMuscular once  insulin glargine Injectable (LANTUS) 12 Unit(s) SubCutaneous at bedtime  insulin lispro (ADMELOG) corrective regimen sliding scale   SubCutaneous three times a day before meals  insulin lispro (ADMELOG) corrective regimen sliding scale   SubCutaneous at bedtime  insulin lispro Injectable (ADMELOG) 2 Unit(s) SubCutaneous three times a day before meals  levoFLOXacin IVPB 500 milliGRAM(s) IV Intermittent every 24 hours  losartan 50 milliGRAM(s) Oral daily  pantoprazole  Injectable 40 milliGRAM(s) IV Push every 24 hours    MEDICATIONS  (PRN):  acetaminophen     Tablet .. 650 milliGRAM(s) Oral every 6 hours PRN Temp greater or equal to 38C (100.4F), Mild Pain (1 - 3)  aluminum hydroxide/magnesium hydroxide/simethicone Suspension 30 milliLiter(s) Oral every 4 hours PRN Dyspepsia  dextrose Oral Gel 15 Gram(s) Oral once PRN Blood Glucose LESS THAN 70 milliGRAM(s)/deciliter  guaiFENesin Oral Liquid (Sugar-Free) 200 milliGRAM(s) Oral every 6 hours PRN Cough  melatonin 3 milliGRAM(s) Oral at bedtime PRN Insomnia  ondansetron Injectable 4 milliGRAM(s) IV Push every 8 hours PRN Nausea and/or Vomiting    Vital Signs Last 24 Hrs  T(F): 97.8 (09 Oct 2022 05:33), Max: 97.8 (08 Oct 2022 19:40)  HR: 77 (09 Oct 2022 06:57) (64 - 77)  BP: 144/96 (09 Oct 2022 05:33) (134/72 - 144/96)  RR: 18 (09 Oct 2022 05:33) (18 - 18)  SpO2: 97% (09 Oct 2022 05:33) (97% - 99%)  I&O's Summary    08 Oct 2022 07:01  -  09 Oct 2022 07:00  --------------------------------------------------------  IN: 1400 mL / OUT: 0 mL / NET: 1400 mL      PHYSICAL EXAM:  General: NAD, Awake alert and answering questions appropriately   ENT: MMM  Neck: Supple, No JVD  Lungs: Clear to auscultation bilaterally  Cardio: RRR, S1/S2, No murmurs  Abdomen: Soft, Nontender, Nondistended; Bowel sounds present  Extremities: No calf tenderness, No pitting edema    LABS:                        12.4   12.35 )-----------( 282      ( 09 Oct 2022 05:40 )             37.7     10-    142  |  107  |  25  ----------------------------<  198  4.2   |  26  |  1.07    Ca    9.2      09 Oct 2022 05:40  Phos  3.2     10-08  Mg     1.6     10-08    TPro  6.2  /  Alb  3.2  /  TBili  1.7  /  DBili  x   /  AST  16  /  ALT  23  /  AlkPhos  103  10-08      PT/INR - ( 07 Oct 2022 18:15 )   PT: 16.1 sec;   INR: 1.38 ratio         PTT - ( 07 Oct 2022 18:15 )  PTT:29.6 sec  Lactate, Blood: 1.4 mmol/L (10-09 @ 05:40)  Lactate, Blood: 2.1 mmol/L (10-08 @ 18:46)  Lactate, Blood: 2.2 mmol/L (10-08 @ 06:30)  Lactate, Blood: 3.5 mmol/L (10-08 @ 02:15)  Lactate, Blood: 2.1 mmol/L (10-07 @ 22:30)    CARDIAC MARKERS ( 08 Oct 2022 06:30 )  x     / 5.9 ng/L / x     / x     / x      CARDIAC MARKERS ( 07 Oct 2022 21:00 )  x     / 7.2 ng/L / x     / x     / x      CARDIAC MARKERS ( 07 Oct 2022 18:15 )  x     / 7.6 ng/L / x     / x     / x            POCT Blood Glucose.: 183 mg/dL (09 Oct 2022 08:08)  POCT Blood Glucose.: 201 mg/dL (08 Oct 2022 21:13)  POCT Blood Glucose.: 174 mg/dL (08 Oct 2022 17:07)  POCT Blood Glucose.: 258 mg/dL (08 Oct 2022 11:22)      Urinalysis Basic - ( 07 Oct 2022 20:15 )    Color: Yellow / Appearance: Clear / S.010 / pH: x  Gluc: x / Ketone: Negative  / Bili: Negative / Urobili: Negative   Blood: x / Protein: 30 mg/dL / Nitrite: Negative   Leuk Esterase: Negative / RBC: 0-4 /HPF / WBC 0-2 /HPF   Sq Epi: x / Non Sq Epi: Neg.-Few / Bacteria: Negative /HPF        Culture - Blood (collected 07 Oct 2022 18:15)  Source: .Blood Blood-Peripheral  Preliminary Report (09 Oct 2022 01:03):    No growth to date.    Culture - Blood (collected 07 Oct 2022 18:15)  Source: .Blood Blood-Peripheral  Preliminary Report (09 Oct 2022 01:03):    No growth to date.          RADIOLOGY & ADDITIONAL TESTS:    Care Discussed with Consultants/Other Providers:   
VERA LOMBARDO  [id number]      Chief Complaint: Dyspnea/Entero/Rhinovirus URI    Interval History: The patient reports feeling better. Denies palpitations or chest pain. Has mild cough.     Tele: significant artifact, otherwise sinus rhythm 60s BPM      Current meds:   acetaminophen     Tablet .. 650 milliGRAM(s) Oral every 6 hours PRN  ALBUTerol   0.042% 1.25 milliGRAM(s) Nebulizer every 8 hours  aluminum hydroxide/magnesium hydroxide/simethicone Suspension 30 milliLiter(s) Oral every 4 hours PRN  atorvastatin 20 milliGRAM(s) Oral at bedtime  buDESOnide    Inhalation Suspension 0.5 milliGRAM(s) Inhalation two times a day  dextrose 5%. 1000 milliLiter(s) IV Continuous <Continuous>  dextrose 5%. 1000 milliLiter(s) IV Continuous <Continuous>  dextrose 50% Injectable 25 Gram(s) IV Push once  dextrose 50% Injectable 12.5 Gram(s) IV Push once  dextrose 50% Injectable 25 Gram(s) IV Push once  dextrose Oral Gel 15 Gram(s) Oral once PRN  donepezil 10 milliGRAM(s) Oral at bedtime  glucagon  Injectable 1 milliGRAM(s) IntraMuscular once  guaiFENesin Oral Liquid (Sugar-Free) 200 milliGRAM(s) Oral every 6 hours PRN  insulin glargine Injectable (LANTUS) 12 Unit(s) SubCutaneous at bedtime  insulin lispro (ADMELOG) corrective regimen sliding scale   SubCutaneous three times a day before meals  insulin lispro (ADMELOG) corrective regimen sliding scale   SubCutaneous at bedtime  insulin lispro Injectable (ADMELOG) 2 Unit(s) SubCutaneous three times a day before meals  levoFLOXacin IVPB 500 milliGRAM(s) IV Intermittent every 24 hours  losartan 50 milliGRAM(s) Oral daily  melatonin 3 milliGRAM(s) Oral at bedtime PRN  ondansetron Injectable 4 milliGRAM(s) IV Push every 8 hours PRN  pantoprazole  Injectable 40 milliGRAM(s) IV Push every 24 hours      Objective:     Vital Signs:   T(C): 36.6 (10-09-22 @ 05:33), Max: 36.6 (10-08-22 @ 19:40)  HR: 77 (10-09-22 @ 06:57) (64 - 77)  BP: 144/96 (10-09-22 @ 05:33) (134/72 - 144/96)  RR: 18 (10-09-22 @ 05:33) (18 - 18)  SpO2: 97% (10-09-22 @ 05:33) (97% - 99%)  Wt(kg): --    PHYSICAL EXAM:  General: elderly woman, no acute distress  HEENT: sclera anicteric  Neck: supple  CVS: JVP ~ 7 cm H20, RRR, s1, s2, no murmurs/rubs/gallops  Chest: unlabored respirations, coarse breath sounds/wheezing  Abdomen: obese  Extremities: no lower extremity edema b/l  Neuro: awake, alert & oriented x 3  Psych: normal affect      Labs:   09 Oct 2022 05:40    142    |  107    |  25     ----------------------------<  198    4.2     |  26     |  1.07     Ca    9.2        09 Oct 2022 05:40  Phos  3.2       08 Oct 2022 06:30  Mg     1.6       08 Oct 2022 06:30    TPro  6.2    /  Alb  3.2    /  TBili  1.7    /  DBili  x      /  AST  16     /  ALT  23     /  AlkPhos  103    08 Oct 2022 06:30                          12.4   12.35 )-----------( 282      ( 09 Oct 2022 05:40 )             37.7     PT/INR - ( 07 Oct 2022 18:15 )   PT: 16.1 sec;   INR: 1.38 ratio         PTT - ( 07 Oct 2022 18:15 )  PTT:29.6 sec          ECG (10/7/22): sinus rhythm, left axis deviation, nonspecific ST abnormalities     CTPA (10/7/22):  No pulmonary embolism.    TTE (10/8/22):   1. Technically difficult study with poor endocardial visualization.   2. The left ventricle is not well visualized, appears to have normal   systolic function. Consider use of IV echo contrast to better evaluate   endocardium, if clinically indicated.   3. Moderately increased LV wall thickness.   4. The right ventricle is not well visualized, appears to have normal   systolic function.   5. Mild mitral annular calcification.   6. The aortic valve leaflets are not well visualized, appears to have   calcification.   7. The pericardium was not well visualized.    
Patient is a 77y old  Female who presents with a chief complaint of sob (07 Oct 2022 21:18)      Subjective and overnight events:  Patient seen and examined at bedside. reports feeling better, SOB improved. + cough with congestions. no fever, chills, wheezing, cp, abd pain, n/v/d. + some soft tissue swelling around lumbar area (had this swelling in the past after a fall 8 months ago)    ALLERGIES:  Bactrim (Short breath)  penicillins (Rash)  Zyflo (Stomach Upset; Vomiting)    MEDICATIONS  (STANDING):  ALBUTerol   0.042% 1.25 milliGRAM(s) Nebulizer every 8 hours  atorvastatin 20 milliGRAM(s) Oral at bedtime  dextrose 5%. 1000 milliLiter(s) (50 mL/Hr) IV Continuous <Continuous>  dextrose 5%. 1000 milliLiter(s) (100 mL/Hr) IV Continuous <Continuous>  dextrose 50% Injectable 25 Gram(s) IV Push once  dextrose 50% Injectable 12.5 Gram(s) IV Push once  dextrose 50% Injectable 25 Gram(s) IV Push once  donepezil 10 milliGRAM(s) Oral at bedtime  glucagon  Injectable 1 milliGRAM(s) IntraMuscular once  insulin glargine Injectable (LANTUS) 12 Unit(s) SubCutaneous at bedtime  insulin lispro (ADMELOG) corrective regimen sliding scale   SubCutaneous three times a day before meals  insulin lispro (ADMELOG) corrective regimen sliding scale   SubCutaneous at bedtime  insulin lispro Injectable (ADMELOG) 2 Unit(s) SubCutaneous three times a day before meals  levoFLOXacin IVPB 500 milliGRAM(s) IV Intermittent every 24 hours  losartan 50 milliGRAM(s) Oral daily  methylPREDNISolone sodium succinate Injectable 40 milliGRAM(s) IV Push every 12 hours  pantoprazole  Injectable 40 milliGRAM(s) IV Push every 24 hours  sodium chloride 0.45%. 1000 milliLiter(s) (100 mL/Hr) IV Continuous <Continuous>    MEDICATIONS  (PRN):  acetaminophen     Tablet .. 650 milliGRAM(s) Oral every 6 hours PRN Temp greater or equal to 38C (100.4F), Mild Pain (1 - 3)  aluminum hydroxide/magnesium hydroxide/simethicone Suspension 30 milliLiter(s) Oral every 4 hours PRN Dyspepsia  dextrose Oral Gel 15 Gram(s) Oral once PRN Blood Glucose LESS THAN 70 milliGRAM(s)/deciliter  melatonin 3 milliGRAM(s) Oral at bedtime PRN Insomnia  ondansetron Injectable 4 milliGRAM(s) IV Push every 8 hours PRN Nausea and/or Vomiting    Vital Signs Last 24 Hrs  T(F): 98.2 (08 Oct 2022 05:09), Max: 99.7 (07 Oct 2022 17:41)  HR: 72 (08 Oct 2022 08:10) (72 - 88)  BP: 135/69 (08 Oct 2022 05:09) (103/79 - 151/64)  RR: 16 (08 Oct 2022 05:09) (16 - 22)  SpO2: 97% (08 Oct 2022 08:10) (82% - 100%)  I&O's Summary    07 Oct 2022 07:01  -  08 Oct 2022 07:00  --------------------------------------------------------  IN: 750 mL / OUT: 0 mL / NET: 750 mL      PHYSICAL EXAM:  General: NAD, Awake alert and answering questions   ENT: MMM  Neck: Supple, No JVD  Lungs: slight wheezes on inspiration. no rhonchi or rales  Cardio: RRR, S1/S2, No murmurs  Abdomen: Soft, Nontender, Nondistended; Bowel sounds present  Extremities: No calf tenderness, No pitting edema  MSK: soft tissue swelling around lower thoraic/lumbar area, mild pain on palpation     LABS:                        12.6   7.06  )-----------( 251      ( 08 Oct 2022 06:30 )             37.9     10-08    140  |  107  |  18  ----------------------------<  245  4.4   |  24  |  0.99    Ca    9.0      08 Oct 2022 06:30  Phos  3.2     10-08  Mg     1.6     10-08    TPro  6.2  /  Alb  3.2  /  TBili  1.7  /  DBili  x   /  AST  16  /  ALT  23  /  AlkPhos  103  10-08      PT/INR - ( 07 Oct 2022 18:15 )   PT: 16.1 sec;   INR: 1.38 ratio         PTT - ( 07 Oct 2022 18:15 )  PTT:29.6 sec  Lactate, Blood: 2.2 mmol/L (10-08 @ 06:30)  Lactate, Blood: 3.5 mmol/L (10-08 @ 02:15)  Lactate, Blood: 2.1 mmol/L (10-07 @ 22:30)  Lactate, Blood: 3.7 mmol/L (10-07 @ 18:25)    CARDIAC MARKERS ( 08 Oct 2022 06:30 )  x     / 5.9 ng/L / x     / x     / x      CARDIAC MARKERS ( 07 Oct 2022 21:00 )  x     / 7.2 ng/L / x     / x     / x      CARDIAC MARKERS ( 07 Oct 2022 18:15 )  x     / 7.6 ng/L / x     / x     / x            POCT Blood Glucose.: 258 mg/dL (08 Oct 2022 11:22)  POCT Blood Glucose.: 229 mg/dL (08 Oct 2022 08:08)  POCT Blood Glucose.: 319 mg/dL (07 Oct 2022 23:03)  POCT Blood Glucose.: 186 mg/dL (07 Oct 2022 17:57)      Urinalysis Basic - ( 07 Oct 2022 20:15 )    Color: Yellow / Appearance: Clear / S.010 / pH: x  Gluc: x / Ketone: Negative  / Bili: Negative / Urobili: Negative   Blood: x / Protein: 30 mg/dL / Nitrite: Negative   Leuk Esterase: Negative / RBC: 0-4 /HPF / WBC 0-2 /HPF   Sq Epi: x / Non Sq Epi: Neg.-Few / Bacteria: Negative /HPF            RADIOLOGY & ADDITIONAL TESTS:    < from: CT Angio Chest PE Protocol w/ IV Cont (10.07.22 @ 19:46) >  IMPRESSION:  No pulmonary embolism.    --- End of Report ---    < end of copied text >    Care Discussed with Consultants/Other Providers: cardiology, cont telemonitoring  
Patient is a 77y old  Female who presents with a chief complaint of sob (10 Oct 2022 11:53)      Subjective and overnight events:  Patient seen and examined at bedside. reports feeling well. no fever, chills, sob, cp, abd pain . reports wheezing improving    ALLERGIES:  Bactrim (Short breath)  penicillins (Rash)  Zyflo (Stomach Upset; Vomiting)    MEDICATIONS  (STANDING):  ALBUTerol   0.042% 1.25 milliGRAM(s) Nebulizer every 8 hours  atorvastatin 20 milliGRAM(s) Oral at bedtime  budesonide 160 MICROgram(s)/formoterol 4.5 MICROgram(s) Inhaler 2 Puff(s) Inhalation two times a day  dextrose 5%. 1000 milliLiter(s) (50 mL/Hr) IV Continuous <Continuous>  dextrose 5%. 1000 milliLiter(s) (100 mL/Hr) IV Continuous <Continuous>  dextrose 50% Injectable 25 Gram(s) IV Push once  dextrose 50% Injectable 12.5 Gram(s) IV Push once  dextrose 50% Injectable 25 Gram(s) IV Push once  donepezil 10 milliGRAM(s) Oral at bedtime  glucagon  Injectable 1 milliGRAM(s) IntraMuscular once  insulin lispro (ADMELOG) corrective regimen sliding scale   SubCutaneous three times a day before meals  insulin lispro (ADMELOG) corrective regimen sliding scale   SubCutaneous at bedtime  levoFLOXacin IVPB 500 milliGRAM(s) IV Intermittent every 24 hours  losartan 50 milliGRAM(s) Oral daily    MEDICATIONS  (PRN):  acetaminophen     Tablet .. 650 milliGRAM(s) Oral every 6 hours PRN Temp greater or equal to 38C (100.4F), Mild Pain (1 - 3)  ALBUTerol    90 MICROgram(s) HFA Inhaler 1 Puff(s) Inhalation every 6 hours PRN Shortness of Breath and/or Wheezing  aluminum hydroxide/magnesium hydroxide/simethicone Suspension 30 milliLiter(s) Oral every 4 hours PRN Dyspepsia  dextrose Oral Gel 15 Gram(s) Oral once PRN Blood Glucose LESS THAN 70 milliGRAM(s)/deciliter  guaiFENesin Oral Liquid (Sugar-Free) 200 milliGRAM(s) Oral every 6 hours PRN Cough  melatonin 3 milliGRAM(s) Oral at bedtime PRN Insomnia  ondansetron Injectable 4 milliGRAM(s) IV Push every 8 hours PRN Nausea and/or Vomiting    Vital Signs Last 24 Hrs  T(F): 97.9 (10 Oct 2022 05:26), Max: 98.2 (09 Oct 2022 16:11)  HR: 79 (10 Oct 2022 09:15) (62 - 80)  BP: 154/79 (10 Oct 2022 05:26) (120/63 - 154/79)  RR: 15 (10 Oct 2022 05:26) (15 - 17)  SpO2: 98% (10 Oct 2022 09:15) (96% - 99%)  I&O's Summary    09 Oct 2022 07:01  -  10 Oct 2022 07:00  --------------------------------------------------------  IN: 980 mL / OUT: 0 mL / NET: 980 mL      PHYSICAL EXAM:  General: NAD, A/O x 3  ENT: MMM  Neck: Supple, No JVD  Lungs:  slight bilateral wheezes  Cardio: RRR, S1/S2, No murmurs  Abdomen: Soft, Nontender, Nondistended; Bowel sounds present  Extremities: No calf tenderness, No pitting edema    LABS:                        12.4   12.35 )-----------( 282      ( 09 Oct 2022 05:40 )             37.7     10-09    142  |  107  |  25  ----------------------------<  198  4.2   |  26  |  1.07    Ca    9.2      09 Oct 2022 05:40  Phos  3.2     10-08  Mg     1.6     10-08    TPro  6.2  /  Alb  3.2  /  TBili  1.7  /  DBili  x   /  AST  16  /  ALT  23  /  AlkPhos  103  10      PT/INR - ( 07 Oct 2022 18:15 )   PT: 16.1 sec;   INR: 1.38 ratio         PTT - ( 07 Oct 2022 18:15 )  PTT:29.6 sec  Lactate, Blood: 1.4 mmol/L (10-09 @ 05:40)  Lactate, Blood: 2.1 mmol/L (10-08 @ 18:46)  Lactate, Blood: 2.2 mmol/L (10-08 @ 06:30)  Lactate, Blood: 3.5 mmol/L (10-08 @ 02:15)  Lactate, Blood: 2.1 mmol/L (10-07 @ 22:30)    CARDIAC MARKERS ( 08 Oct 2022 06:30 )  x     / 5.9 ng/L / x     / x     / x      CARDIAC MARKERS ( 07 Oct 2022 21:00 )  x     / 7.2 ng/L / x     / x     / x      CARDIAC MARKERS ( 07 Oct 2022 18:15 )  x     / 7.6 ng/L / x     / x     / x            POCT Blood Glucose.: 169 mg/dL (10 Oct 2022 11:30)  POCT Blood Glucose.: 92 mg/dL (10 Oct 2022 07:55)  POCT Blood Glucose.: 201 mg/dL (09 Oct 2022 21:14)  POCT Blood Glucose.: 143 mg/dL (09 Oct 2022 17:11)      Urinalysis Basic - ( 07 Oct 2022 20:15 )    Color: Yellow / Appearance: Clear / S.010 / pH: x  Gluc: x / Ketone: Negative  / Bili: Negative / Urobili: Negative   Blood: x / Protein: 30 mg/dL / Nitrite: Negative   Leuk Esterase: Negative / RBC: 0-4 /HPF / WBC 0-2 /HPF   Sq Epi: x / Non Sq Epi: Neg.-Few / Bacteria: Negative /HPF      Culture - Urine (collected 07 Oct 2022 20:15)  Source: Clean Catch Clean Catch (Midstream)  Final Report (09 Oct 2022 15:01):    <10,000 CFU/mL Normal Urogenital Ginny    Culture - Blood (collected 07 Oct 2022 18:15)  Source: .Blood Blood-Peripheral  Preliminary Report (09 Oct 2022 01:03):    No growth to date.    Culture - Blood (collected 07 Oct 2022 18:15)  Source: .Blood Blood-Peripheral  Preliminary Report (09 Oct 2022 01:03):    No growth to date.          RADIOLOGY & ADDITIONAL TESTS:    Care Discussed with Consultants/Other Providers:   
VERA LOMBARDO  [id number]      Chief Complaint: Dyspnea/Entero/Rhinovirus URI    Interval History: The patient reports feeling better. Denies palpitations, chest pain or shortness of breath.    Tele: significant artifact, otherwise sinus rhythm 60s BPM      Current meds:   acetaminophen     Tablet .. 650 milliGRAM(s) Oral every 6 hours PRN  ALBUTerol    90 MICROgram(s) HFA Inhaler 1 Puff(s) Inhalation every 6 hours PRN  ALBUTerol   0.042% 1.25 milliGRAM(s) Nebulizer every 8 hours  aluminum hydroxide/magnesium hydroxide/simethicone Suspension 30 milliLiter(s) Oral every 4 hours PRN  atorvastatin 20 milliGRAM(s) Oral at bedtime  budesonide 160 MICROgram(s)/formoterol 4.5 MICROgram(s) Inhaler 2 Puff(s) Inhalation two times a day  dextrose 5%. 1000 milliLiter(s) IV Continuous <Continuous>  dextrose 5%. 1000 milliLiter(s) IV Continuous <Continuous>  dextrose 50% Injectable 25 Gram(s) IV Push once  dextrose 50% Injectable 12.5 Gram(s) IV Push once  dextrose 50% Injectable 25 Gram(s) IV Push once  dextrose Oral Gel 15 Gram(s) Oral once PRN  donepezil 10 milliGRAM(s) Oral at bedtime  glucagon  Injectable 1 milliGRAM(s) IntraMuscular once  guaiFENesin Oral Liquid (Sugar-Free) 200 milliGRAM(s) Oral every 6 hours PRN  insulin lispro (ADMELOG) corrective regimen sliding scale   SubCutaneous three times a day before meals  insulin lispro (ADMELOG) corrective regimen sliding scale   SubCutaneous at bedtime  levoFLOXacin IVPB 500 milliGRAM(s) IV Intermittent every 24 hours  losartan 50 milliGRAM(s) Oral daily  melatonin 3 milliGRAM(s) Oral at bedtime PRN  ondansetron Injectable 4 milliGRAM(s) IV Push every 8 hours PRN      Objective:       Vital Signs:   T(C): 36.6 (10-10-22 @ 05:26), Max: 36.8 (10-09-22 @ 16:11)  HR: 79 (10-10-22 @ 09:15) (62 - 80)  BP: 154/79 (10-10-22 @ 05:26) (120/63 - 154/79)  RR: 15 (10-10-22 @ 05:26) (15 - 17)  SpO2: 98% (10-10-22 @ 09:15) (96% - 99%)  Wt(kg): --    PHYSICAL EXAM:  General: elderly woman, no acute distress  HEENT: sclera anicteric  Neck: supple  CVS: JVP ~ 7 cm H20, RRR, s1, s2, no murmurs/rubs/gallops  Chest: unlabored respirations, coarse breath sounds  Abdomen: obese  Extremities: no lower extremity edema b/l  Neuro: awake, alert & oriented x 3  Psych: normal affect      Labs:   09 Oct 2022 05:40    142    |  107    |  25     ----------------------------<  198    4.2     |  26     |  1.07     Ca    9.2        09 Oct 2022 05:40                            12.4   12.35 )-----------( 282      ( 09 Oct 2022 05:40 )             37.7         ECG (10/7/22): sinus rhythm, left axis deviation, nonspecific ST abnormalities     CTPA (10/7/22):  No pulmonary embolism.    TTE (10/8/22):   1. Technically difficult study with poor endocardial visualization.   2. The left ventricle is not well visualized, appears to have normal   systolic function. Consider use of IV echo contrast to better evaluate   endocardium, if clinically indicated.   3. Moderately increased LV wall thickness.   4. The right ventricle is not well visualized, appears to have normal   systolic function.   5. Mild mitral annular calcification.   6. The aortic valve leaflets are not well visualized, appears to have   calcification.   7. The pericardium was not well visualized.

## 2022-10-10 NOTE — DIETITIAN INITIAL EVALUATION ADULT - PERTINENT MEDS FT
MEDICATIONS  (STANDING):  ALBUTerol   0.042% 1.25 milliGRAM(s) Nebulizer every 8 hours  atorvastatin 20 milliGRAM(s) Oral at bedtime  budesonide 160 MICROgram(s)/formoterol 4.5 MICROgram(s) Inhaler 2 Puff(s) Inhalation two times a day  dextrose 5%. 1000 milliLiter(s) (50 mL/Hr) IV Continuous <Continuous>  dextrose 5%. 1000 milliLiter(s) (100 mL/Hr) IV Continuous <Continuous>  dextrose 50% Injectable 25 Gram(s) IV Push once  dextrose 50% Injectable 12.5 Gram(s) IV Push once  dextrose 50% Injectable 25 Gram(s) IV Push once  donepezil 10 milliGRAM(s) Oral at bedtime  glucagon  Injectable 1 milliGRAM(s) IntraMuscular once  insulin lispro (ADMELOG) corrective regimen sliding scale   SubCutaneous three times a day before meals  insulin lispro (ADMELOG) corrective regimen sliding scale   SubCutaneous at bedtime  levoFLOXacin IVPB 500 milliGRAM(s) IV Intermittent every 24 hours  losartan 50 milliGRAM(s) Oral daily    MEDICATIONS  (PRN):  acetaminophen     Tablet .. 650 milliGRAM(s) Oral every 6 hours PRN Temp greater or equal to 38C (100.4F), Mild Pain (1 - 3)  ALBUTerol    90 MICROgram(s) HFA Inhaler 1 Puff(s) Inhalation every 6 hours PRN Shortness of Breath and/or Wheezing  aluminum hydroxide/magnesium hydroxide/simethicone Suspension 30 milliLiter(s) Oral every 4 hours PRN Dyspepsia  dextrose Oral Gel 15 Gram(s) Oral once PRN Blood Glucose LESS THAN 70 milliGRAM(s)/deciliter  guaiFENesin Oral Liquid (Sugar-Free) 200 milliGRAM(s) Oral every 6 hours PRN Cough  melatonin 3 milliGRAM(s) Oral at bedtime PRN Insomnia  ondansetron Injectable 4 milliGRAM(s) IV Push every 8 hours PRN Nausea and/or Vomiting

## 2022-10-10 NOTE — DISCHARGE NOTE PROVIDER - CARE PROVIDER_API CALL
Richard Sandoval)  Cardiovascular Disease; Internal Medicine  [address] [facility], [address]  [place] [address]  Phone: [phone]  Fax: [phone]  Follow Up Time:     Luna Del Real)  EndocrinologyMetabDiabetes; Internal Medicine  [address] [address]  Phone: [phone]  Fax: [phone]  Follow Up Time:     Harmeet Diop)  [address], [address]  [place] [address]  Phone: [phone]  Fax: [phone]  Follow Up Time:     America Caceres  Phone: (   )    -  Fax: (   )    -  Follow Up Time:

## 2022-10-10 NOTE — DISCHARGE NOTE PROVIDER - NSDCCPCAREPLAN_GEN_ALL_CORE_FT
PRINCIPAL DISCHARGE DIAGNOSIS  Diagnosis: Acute asthma exacerbation  Assessment and Plan of Treatment: due to enterovirus infection. Repiratory status improved. Patient was seen by pulmonologits. Patient is stable for discharge home on prednsione taper.       PRINCIPAL DISCHARGE DIAGNOSIS  Diagnosis: Acute asthma exacerbation  Assessment and Plan of Treatment: due to enterovirus infection. Repiratory status improved. Patient was seen by pulmonologist. Patient is stable for discharge home on prednsione taper.

## 2022-10-11 RX ORDER — FLUTICASONE PROPIONATE 50 UG/1
50 SPRAY, METERED NASAL TWICE DAILY
Qty: 1 | Refills: 2 | Status: COMPLETED | COMMUNITY
Start: 2019-12-27 | End: 2022-10-11

## 2022-10-13 ENCOUNTER — NON-APPOINTMENT (OUTPATIENT)
Age: 77
End: 2022-10-13

## 2022-10-13 LAB
CULTURE RESULTS: SIGNIFICANT CHANGE UP
CULTURE RESULTS: SIGNIFICANT CHANGE UP
SPECIMEN SOURCE: SIGNIFICANT CHANGE UP
SPECIMEN SOURCE: SIGNIFICANT CHANGE UP

## 2022-10-13 RX ORDER — ZOSTER VACCINE RECOMBINANT, ADJUVANTED 50 MCG/0.5
50 KIT INTRAMUSCULAR
Qty: 0.5 | Refills: 1 | Status: COMPLETED | COMMUNITY
Start: 2020-10-20 | End: 2022-10-13

## 2022-10-13 RX ORDER — DONEPEZIL HYDROCHLORIDE 10 MG/1
10 TABLET ORAL DAILY
Qty: 90 | Refills: 1 | Status: COMPLETED | COMMUNITY
Start: 2020-11-19 | End: 2022-10-13

## 2022-10-14 ENCOUNTER — APPOINTMENT (OUTPATIENT)
Dept: FAMILY MEDICINE | Facility: CLINIC | Age: 77
End: 2022-10-14

## 2022-10-14 VITALS
OXYGEN SATURATION: 99 % | WEIGHT: 178.44 LBS | TEMPERATURE: 97 F | SYSTOLIC BLOOD PRESSURE: 126 MMHG | HEIGHT: 65 IN | DIASTOLIC BLOOD PRESSURE: 80 MMHG | RESPIRATION RATE: 16 BRPM | HEART RATE: 71 BPM | BODY MASS INDEX: 29.73 KG/M2

## 2022-10-14 DIAGNOSIS — R94.31 ABNORMAL ELECTROCARDIOGRAM [ECG] [EKG]: ICD-10-CM

## 2022-10-14 PROCEDURE — 99496 TRANSJ CARE MGMT HIGH F2F 7D: CPT

## 2022-10-19 PROBLEM — R94.31 ABNORMAL ECG: Status: ACTIVE | Noted: 2022-10-14

## 2022-10-19 NOTE — HISTORY OF PRESENT ILLNESS
[Admitted on: ___] : The patient was admitted on [unfilled] [Discharged on ___] : discharged on [unfilled] [FreeTextEntry2] : Patient was admitted and treated for acute asthma exacerbation secondary to viral upper respiratory infection.  Procalcitonin was elevated concerning for bacterial infectious cause, thus medical team treated with levofloxacin.  She was also treated with prednisone taper and is completing dose at home.\par When EMS performed an EKG she had questionable atrial fibrillation.  Attending note was not convinced due to poor quality of ecg tracing, and felt for completeness sake patient should follow-up with cardiology.\par \par \par Since she has been discharged, daughter reports symptoms have been improving.  Still continues to have dry cough.\par Patient has been receiving nebulizer treatments at least twice a day.  Has appointment with pulmonologist in 1 week.  Daughter also in the process of making appointment with cardiology.\par \par Daughter concerned of persistent soft nonfluctuant mass on patient's lower back.  She believes it is enlarging.  We evaluated similar questionable mass post trauma about 1 year ago which was only indicative of a hematoma.

## 2022-10-19 NOTE — PHYSICAL EXAM
[No Acute Distress] : no acute distress [Well Nourished] : well nourished [Well Developed] : well developed [Well-Appearing] : well-appearing [Normal Sclera/Conjunctiva] : normal sclera/conjunctiva [PERRL] : pupils equal round and reactive to light [EOMI] : extraocular movements intact [Normal Outer Ear/Nose] : the outer ears and nose were normal in appearance [Normal Oropharynx] : the oropharynx was normal [No JVD] : no jugular venous distention [No Lymphadenopathy] : no lymphadenopathy [Supple] : supple [Thyroid Normal, No Nodules] : the thyroid was normal and there were no nodules present [No Respiratory Distress] : no respiratory distress  [No Accessory Muscle Use] : no accessory muscle use [Clear to Auscultation] : lungs were clear to auscultation bilaterally [Normal Rate] : normal rate  [Regular Rhythm] : with a regular rhythm [Normal S1, S2] : normal S1 and S2 [No Murmur] : no murmur heard [No Carotid Bruits] : no carotid bruits [No Abdominal Bruit] : a ~M bruit was not heard ~T in the abdomen [No Varicosities] : no varicosities [Pedal Pulses Present] : the pedal pulses are present [No Edema] : there was no peripheral edema [No Palpable Aorta] : no palpable aorta [No Extremity Clubbing/Cyanosis] : no extremity clubbing/cyanosis [Soft] : abdomen soft [Non Tender] : non-tender [Non-distended] : non-distended [No Masses] : no abdominal mass palpated [No HSM] : no HSM [Normal Bowel Sounds] : normal bowel sounds [Normal Posterior Cervical Nodes] : no posterior cervical lymphadenopathy [Normal Anterior Cervical Nodes] : no anterior cervical lymphadenopathy [No CVA Tenderness] : no CVA  tenderness [No Spinal Tenderness] : no spinal tenderness [No Joint Swelling] : no joint swelling [Grossly Normal Strength/Tone] : grossly normal strength/tone [No Rash] : no rash [Coordination Grossly Intact] : coordination grossly intact [No Focal Deficits] : no focal deficits [Normal Gait] : normal gait [Deep Tendon Reflexes (DTR)] : deep tendon reflexes were 2+ and symmetric [Normal Affect] : the affect was normal [Normal Insight/Judgement] : insight and judgment were intact [de-identified] : slight wheezing bilaterally  [de-identified] : Large soft non tender mass over lower lumbar spine with peripheral ecchymosis - midly tender to touch  [12975 - High Complexity requires an extensive number of possible diagnoses and/or the management options, extensive complexity of the medical data (tests, etc.) to be reviewed, and a high risk of significant complications, morbidity, and/or mortality as w] : High Complexity

## 2022-10-20 PROCEDURE — 94760 N-INVAS EAR/PLS OXIMETRY 1: CPT

## 2022-10-20 PROCEDURE — 87086 URINE CULTURE/COLONY COUNT: CPT

## 2022-10-20 PROCEDURE — 80048 BASIC METABOLIC PNL TOTAL CA: CPT

## 2022-10-20 PROCEDURE — 96365 THER/PROPH/DIAG IV INF INIT: CPT

## 2022-10-20 PROCEDURE — 0225U NFCT DS DNA&RNA 21 SARSCOV2: CPT

## 2022-10-20 PROCEDURE — 85610 PROTHROMBIN TIME: CPT

## 2022-10-20 PROCEDURE — 71275 CT ANGIOGRAPHY CHEST: CPT | Mod: MA

## 2022-10-20 PROCEDURE — 83735 ASSAY OF MAGNESIUM: CPT

## 2022-10-20 PROCEDURE — 96375 TX/PRO/DX INJ NEW DRUG ADDON: CPT

## 2022-10-20 PROCEDURE — 84100 ASSAY OF PHOSPHORUS: CPT

## 2022-10-20 PROCEDURE — 84484 ASSAY OF TROPONIN QUANT: CPT

## 2022-10-20 PROCEDURE — 81001 URINALYSIS AUTO W/SCOPE: CPT

## 2022-10-20 PROCEDURE — 85025 COMPLETE CBC W/AUTO DIFF WBC: CPT

## 2022-10-20 PROCEDURE — 93306 TTE W/DOPPLER COMPLETE: CPT

## 2022-10-20 PROCEDURE — 83036 HEMOGLOBIN GLYCOSYLATED A1C: CPT

## 2022-10-20 PROCEDURE — 36415 COLL VENOUS BLD VENIPUNCTURE: CPT

## 2022-10-20 PROCEDURE — 72100 X-RAY EXAM L-S SPINE 2/3 VWS: CPT

## 2022-10-20 PROCEDURE — 84145 PROCALCITONIN (PCT): CPT

## 2022-10-20 PROCEDURE — 99285 EMERGENCY DEPT VISIT HI MDM: CPT

## 2022-10-20 PROCEDURE — 71045 X-RAY EXAM CHEST 1 VIEW: CPT

## 2022-10-20 PROCEDURE — 94640 AIRWAY INHALATION TREATMENT: CPT

## 2022-10-20 PROCEDURE — 72072 X-RAY EXAM THORAC SPINE 3VWS: CPT

## 2022-10-20 PROCEDURE — 85730 THROMBOPLASTIN TIME PARTIAL: CPT

## 2022-10-20 PROCEDURE — 83605 ASSAY OF LACTIC ACID: CPT

## 2022-10-20 PROCEDURE — 82962 GLUCOSE BLOOD TEST: CPT

## 2022-10-20 PROCEDURE — 87040 BLOOD CULTURE FOR BACTERIA: CPT

## 2022-10-20 PROCEDURE — 80053 COMPREHEN METABOLIC PANEL: CPT

## 2022-10-20 PROCEDURE — 93005 ELECTROCARDIOGRAM TRACING: CPT

## 2022-10-27 ENCOUNTER — APPOINTMENT (OUTPATIENT)
Dept: ULTRASOUND IMAGING | Facility: CLINIC | Age: 77
End: 2022-10-27

## 2022-10-27 PROCEDURE — 76705 ECHO EXAM OF ABDOMEN: CPT

## 2022-10-31 ENCOUNTER — APPOINTMENT (OUTPATIENT)
Dept: OBGYN | Facility: CLINIC | Age: 77
End: 2022-10-31

## 2022-10-31 ENCOUNTER — ASOB RESULT (OUTPATIENT)
Age: 77
End: 2022-10-31

## 2022-10-31 VITALS
BODY MASS INDEX: 29.32 KG/M2 | HEIGHT: 65 IN | SYSTOLIC BLOOD PRESSURE: 147 MMHG | WEIGHT: 176 LBS | DIASTOLIC BLOOD PRESSURE: 84 MMHG

## 2022-10-31 PROCEDURE — 76830 TRANSVAGINAL US NON-OB: CPT

## 2022-10-31 PROCEDURE — G0101: CPT

## 2022-10-31 PROCEDURE — G0328 FECAL BLOOD SCRN IMMUNOASSAY: CPT | Mod: QW

## 2022-11-17 ENCOUNTER — APPOINTMENT (OUTPATIENT)
Dept: NEUROLOGY | Facility: CLINIC | Age: 77
End: 2022-11-17

## 2022-12-02 NOTE — DISCHARGE NOTE NURSING/CASE MANAGEMENT/SOCIAL WORK - PATIENT PORTAL LINK FT
Yes You can access the FollowMyHealth Patient Portal offered by [facility] by registering at the following website: [email or web address]. By joining [facility]’s FollowMyHealth portal, you will also be able to view your health information using other applications (apps) compatible with our system.

## 2022-12-15 ENCOUNTER — NON-APPOINTMENT (OUTPATIENT)
Age: 77
End: 2022-12-15

## 2022-12-15 ENCOUNTER — APPOINTMENT (OUTPATIENT)
Dept: FAMILY MEDICINE | Facility: CLINIC | Age: 77
End: 2022-12-15

## 2022-12-15 VITALS
OXYGEN SATURATION: 98 % | BODY MASS INDEX: 28.99 KG/M2 | RESPIRATION RATE: 14 BRPM | HEIGHT: 65 IN | SYSTOLIC BLOOD PRESSURE: 143 MMHG | WEIGHT: 174 LBS | DIASTOLIC BLOOD PRESSURE: 71 MMHG | TEMPERATURE: 97.3 F | HEART RATE: 74 BPM

## 2022-12-15 DIAGNOSIS — Z00.00 ENCOUNTER FOR GENERAL ADULT MEDICAL EXAMINATION W/OUT ABNORMAL FINDINGS: ICD-10-CM

## 2022-12-15 DIAGNOSIS — B35.4 TINEA CORPORIS: ICD-10-CM

## 2022-12-15 DIAGNOSIS — J30.9 ALLERGIC RHINITIS, UNSPECIFIED: ICD-10-CM

## 2022-12-15 PROCEDURE — G0439: CPT

## 2022-12-15 PROCEDURE — 36415 COLL VENOUS BLD VENIPUNCTURE: CPT

## 2022-12-15 PROCEDURE — 93000 ELECTROCARDIOGRAM COMPLETE: CPT

## 2022-12-15 NOTE — PHYSICAL EXAM
[No Acute Distress] : no acute distress [Well Nourished] : well nourished [Well Developed] : well developed [Well-Appearing] : well-appearing [Normal Voice/Communication] : normal voice/communication [Normal Sclera/Conjunctiva] : normal sclera/conjunctiva [PERRL] : pupils equal round and reactive to light [EOMI] : extraocular movements intact [Normal Outer Ear/Nose] : the outer ears and nose were normal in appearance [Normal Oropharynx] : the oropharynx was normal [Normal TMs] : both tympanic membranes were normal [Normal Nasal Mucosa] : the nasal mucosa was normal [No JVD] : no jugular venous distention [No Lymphadenopathy] : no lymphadenopathy [Supple] : supple [Thyroid Normal, No Nodules] : the thyroid was normal and there were no nodules present [No Respiratory Distress] : no respiratory distress  [No Accessory Muscle Use] : no accessory muscle use [Clear to Auscultation] : lungs were clear to auscultation bilaterally [Normal Rate] : normal rate  [Regular Rhythm] : with a regular rhythm [Normal S1, S2] : normal S1 and S2 [No Murmur] : no murmur heard [No Carotid Bruits] : no carotid bruits [No Abdominal Bruit] : a ~M bruit was not heard ~T in the abdomen [No Varicosities] : no varicosities [Pedal Pulses Present] : the pedal pulses are present [No Edema] : there was no peripheral edema [No Palpable Aorta] : no palpable aorta [No Extremity Clubbing/Cyanosis] : no extremity clubbing/cyanosis [Soft] : abdomen soft [Non Tender] : non-tender [Non-distended] : non-distended [No Masses] : no abdominal mass palpated [No HSM] : no HSM [Normal Bowel Sounds] : normal bowel sounds [No Hernias] : no hernias [Normal Supraclavicular Nodes] : no supraclavicular lymphadenopathy [Normal Posterior Cervical Nodes] : no posterior cervical lymphadenopathy [Normal Anterior Cervical Nodes] : no anterior cervical lymphadenopathy [No CVA Tenderness] : no CVA  tenderness [No Spinal Tenderness] : no spinal tenderness [No Joint Swelling] : no joint swelling [Grossly Normal Strength/Tone] : grossly normal strength/tone [No Rash] : no rash [No Skin Lesions] : no skin lesions [Coordination Grossly Intact] : coordination grossly intact [No Focal Deficits] : no focal deficits [Normal Gait] : normal gait [Deep Tendon Reflexes (DTR)] : deep tendon reflexes were 2+ and symmetric [Speech Grossly Normal] : speech grossly normal [Normal Affect] : the affect was normal [Alert and Oriented x3] : oriented to person, place, and time [Normal Mood] : the mood was normal [Normal Insight/Judgement] : insight and judgment were intact [FreeTextEntry1] : GI [de-identified] : gyn [de-identified] : GYN [de-identified] : Dystrophic nails that his fingernails secondary to chronic trauma, tinea corporis under both breasts and groin area. [de-identified] : forgetful

## 2022-12-15 NOTE — HISTORY OF PRESENT ILLNESS
[de-identified] : Patient is 77-year-old female who presents today for annual wellness exam patient has multiple underlying medical problems patient this past October was hospitalized for short stay secondary to acute exacerbation of COPD.  Medical history is significant for hypertension, COPD, diabetes mellitus type 2, cognitive decline and hyperlipidemia.\par \par Presently the patient is awake alert and oriented x2-3 in no acute distress accompanied by her daughter.  At today's visit I will be obtaining comprehensive blood work and electrocardiogram.  Patient is followed closely by both pulmonology and neurology.

## 2022-12-15 NOTE — ASSESSMENT
[FreeTextEntry1] : Assessment and plan:\par \par 1.  Comprehensive health maintenance physical exam acute findings tinea corporis we will treat with triamcinolone/nystatin cream.\par \par 2.  Hypertension blood pressure is stable patient will continue losartan 50 mg p.o. daily.\par \par 3.  Chronic obstructive pulmonary disease patient will continue present inhalers she is presently being treated for an upper respiratory tract infection with antibiotics prescribed by her pulmonologist.\par \par 4.  Diabetes mellitus type 2 non-insulin-requiring continue present medical management with metformin I will be checking hemoglobin A1c.\par \par 5.  Hyperlipidemia continue atorvastatin, low-fat low-cholesterol diet check lipid profile and comprehensive metabolic.\par \par 6.  Cognitive decline continue donepezil 10 mg p.o. daily.\par \par 7.  Electrocardiogram stable for patient no acute ST-T wave changes and no sign of ischemia patient will be following up with cardiology in the near future.

## 2022-12-15 NOTE — HEALTH RISK ASSESSMENT
[Good] : ~his/her~  mood as  good [Never] : Never [No] : In the past 12 months have you used drugs other than those required for medical reasons? No [No falls in past year] : Patient reported no falls in the past year [Medical reason not done] : Medical reason not done [Learning/Retaining New Information] : difficulty learning/retaining new information [Handling Complex Tasks] : difficulty handling complex tasks [None] : None [With Family] : lives with family [Retired] : retired [] :  [Fully functional (bathing, dressing, toileting, transferring, walking, feeding)] : Fully functional (bathing, dressing, toileting, transferring, walking, feeding) [Fully functional (using the telephone, shopping, preparing meals, housekeeping, doing laundry, using] : Fully functional and needs no help or supervision to perform IADLs (using the telephone, shopping, preparing meals, housekeeping, doing laundry, using transportation, managing medications and managing finances) [Reports changes in vision] : Reports changes in vision [Smoke Detector] : smoke detector [Carbon Monoxide Detector] : carbon monoxide detector [Safety elements used in home] : safety elements used in home [Seat Belt] :  uses seat belt [Sunscreen] : uses sunscreen [Reviewed no changes] : Reviewed, no changes [Designated Healthcare Proxy] : Designated healthcare proxy [Relationship: ___] : Relationship: [unfilled] [Aggressive treatment] : aggressive treatment [I will adhere to the patient's wishes.] : I will adhere to the patient's wishes. [Audit-CScore] : 0 [de-identified] : Cognitive dysfunction [Change in mental status noted] : No change in mental status noted [Language] : denies difficulty with language [Behavior] : denies difficulty with behavior [Reasoning] : denies difficulty with reasoning [Spatial Ability and Orientation] : denies difficulty with spatial ability and orientation [Sexually Active] : not sexually active [Reports changes in hearing] : Reports no changes in hearing [Reports normal functional visual acuity (ie: able to read med bottle)] : Reports poor functional visual acuity.  [Reports changes in dental health] : Reports no changes in dental health [Travel to Developing Areas] : does not  travel to developing areas [TB Exposure] : is not being exposed to tuberculosis [Caregiver Concerns] : does not have caregiver concerns [de-identified] : glasses [AdvancecareDate] : 12/22

## 2022-12-16 PROBLEM — J30.9 ALLERGIC RHINITIS: Status: ACTIVE | Noted: 2022-12-16

## 2022-12-16 LAB
ALBUMIN SERPL ELPH-MCNC: 3.9 G/DL
ALP BLD-CCNC: 108 U/L
ALT SERPL-CCNC: 15 U/L
ANION GAP SERPL CALC-SCNC: 14 MMOL/L
AST SERPL-CCNC: 15 U/L
BASOPHILS # BLD AUTO: 0.13 K/UL
BASOPHILS NFR BLD AUTO: 1.5 %
BILIRUB SERPL-MCNC: 1.1 MG/DL
BUN SERPL-MCNC: 12 MG/DL
CALCIUM SERPL-MCNC: 9.2 MG/DL
CHLORIDE SERPL-SCNC: 107 MMOL/L
CHOLEST SERPL-MCNC: 136 MG/DL
CO2 SERPL-SCNC: 25 MMOL/L
CREAT SERPL-MCNC: 0.81 MG/DL
EGFR: 75 ML/MIN/1.73M2
EOSINOPHIL # BLD AUTO: 0.73 K/UL
EOSINOPHIL NFR BLD AUTO: 8.4 %
ESTIMATED AVERAGE GLUCOSE: 148 MG/DL
FOLATE SERPL-MCNC: 7.6 NG/ML
GLUCOSE SERPL-MCNC: 142 MG/DL
HBA1C MFR BLD HPLC: 6.8 %
HCT VFR BLD CALC: 38.6 %
HDLC SERPL-MCNC: 44 MG/DL
HGB BLD-MCNC: 12.6 G/DL
IMM GRANULOCYTES NFR BLD AUTO: 0.5 %
LDLC SERPL CALC-MCNC: 74 MG/DL
LYMPHOCYTES # BLD AUTO: 1.43 K/UL
LYMPHOCYTES NFR BLD AUTO: 16.4 %
MAN DIFF?: NORMAL
MCHC RBC-ENTMCNC: 28.3 PG
MCHC RBC-ENTMCNC: 32.6 GM/DL
MCV RBC AUTO: 86.7 FL
MONOCYTES # BLD AUTO: 0.58 K/UL
MONOCYTES NFR BLD AUTO: 6.6 %
NEUTROPHILS # BLD AUTO: 5.82 K/UL
NEUTROPHILS NFR BLD AUTO: 66.6 %
NONHDLC SERPL-MCNC: 92 MG/DL
PLATELET # BLD AUTO: 372 K/UL
POTASSIUM SERPL-SCNC: 3.7 MMOL/L
PROT SERPL-MCNC: 5.9 G/DL
RBC # BLD: 4.45 M/UL
RBC # FLD: 13.8 %
SODIUM SERPL-SCNC: 145 MMOL/L
TRIGL SERPL-MCNC: 93 MG/DL
VIT B12 SERPL-MCNC: 956 PG/ML
WBC # FLD AUTO: 8.73 K/UL

## 2022-12-17 LAB
CREAT SPEC-SCNC: 246 MG/DL
MICROALBUMIN 24H UR DL<=1MG/L-MCNC: 3.5 MG/DL
MICROALBUMIN/CREAT 24H UR-RTO: 14 MG/G

## 2022-12-18 LAB
APPEARANCE: ABNORMAL
BACTERIA: ABNORMAL
BILIRUBIN URINE: NEGATIVE
BLOOD URINE: NEGATIVE
CALCIUM OXALATE CRYSTALS: ABNORMAL
COLOR: YELLOW
GLUCOSE QUALITATIVE U: NEGATIVE
HYALINE CASTS: 2 /LPF
KETONES URINE: NEGATIVE
LEUKOCYTE ESTERASE URINE: NEGATIVE
MICROSCOPIC-UA: NORMAL
NITRITE URINE: NEGATIVE
PH URINE: 6
PROTEIN URINE: NORMAL
RED BLOOD CELLS URINE: 1 /HPF
SPECIFIC GRAVITY URINE: >=1.03
SQUAMOUS EPITHELIAL CELLS: 3 /HPF
URIC ACID CRYSTALS: ABNORMAL
URINE COMMENTS: NORMAL
UROBILINOGEN URINE: NORMAL
WHITE BLOOD CELLS URINE: 1 /HPF

## 2022-12-22 ENCOUNTER — APPOINTMENT (OUTPATIENT)
Dept: NEUROLOGY | Facility: CLINIC | Age: 77
End: 2022-12-22

## 2022-12-22 VITALS
HEIGHT: 65 IN | HEART RATE: 79 BPM | WEIGHT: 174 LBS | BODY MASS INDEX: 28.99 KG/M2 | DIASTOLIC BLOOD PRESSURE: 68 MMHG | SYSTOLIC BLOOD PRESSURE: 134 MMHG

## 2022-12-22 PROCEDURE — 99214 OFFICE O/P EST MOD 30 MIN: CPT

## 2022-12-22 NOTE — PHYSICAL EXAM
[FreeTextEntry1] : Constitutional:  Patient was well-developed, well-nourished and in no acute distress. \par \par Head:  Normocephalic, atraumatic. Tympanic membranes were clear. \par \par Neck:  Supple with full range of motion. \par \par Cardiovascular:  Cardiac rhythm was regular without murmur. There were no carotid bruits. Peripheral pulses were full and symmetric. \par \par Respiratory:  Lungs were clear. \par \par Abdomen:  Soft and nontender. \par \par Spine:  Nontender. \par \par Skin:  There were no rashes. \par \par NEUROLOGICAL EXAMINATION:\par \par Mental Status: She was alert and oriented. Speech was fluent. There was no dysarthria.  She scored 22 out of 30 on MMSE, a one-point decline compared to her last visit.  She made worse in orientation and had difficulty copying interlocking pentagons.  Language function was preserved.  She had no recall.  She was very apathetic with no spontaneous speech.\par \par Cranial Nerves: \par \par II: She could finger count bilaterally. Pupils were equal and reactive. Visual fields were full.  Fundi were normal.\par \par III, IV, VI:  Eye movements were full without nystagmus. \par \par V: Facial sensation was intact. \par \par VII: Facial strength was normal. \par \par VIII: Hearing was equal. \par \par IX, X: Palatal movement was normal. Phonation was normal. \par \par XI: Sternocleidomastoids and trapezii were normal. \par \par XII: Tongue was midline and movements normal. There was no lingual atrophy or fasciculations. \par \par Motor Examination: Muscle bulk, tone and strength were normal. \par \par Sensory Examination: Pinprick, vibration and joint position sense were intact. \par \par Reflexes: DTRs were 2 the biceps and absent elsewhere.\par \par Plantar Responses: Plantar responses were flexor. \par \par Coordination/Cerebellar Function: There was no dysmetria on finger to nose or heel to shin testing. \par \par Gait/Stance: Gait was stable.  Tandem was mildly unsteady. Romberg was negative.\par

## 2022-12-22 NOTE — CONSULT LETTER
[Dear  ___] : Dear  [unfilled], [Consult Letter:] : I had the pleasure of evaluating your patient, [unfilled]. [Please see my note below.] : Please see my note below. [Consult Closing:] : Thank you very much for allowing me to participate in the care of this patient.  If you have any questions, please do not hesitate to contact me. [Sincerely,] : Sincerely, [FreeTextEntry3] : Harmeet Mathews MD\par  [DrEunice  ___] : Dr. MARTINEZ

## 2022-12-22 NOTE — ASSESSMENT
[FreeTextEntry1] : Mrs. 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  Dr. Mary Burciaga felt that her FDG PET scan performed in October 2021 was consistent with frontotemporal dementia.  There was normal uptake in the posterior cingulate gyrus and only mild hypometabolism in the precuneus.  There was mild frontal and more prominent anterior temporal hypometabolism.\par \par I had a long discussion with Fatou and her mother regarding management of her neurodegenerative disorder.  They agree to begin treatment with sertraline 25 mg/day.  She will continue donepezil 10 mg daily.  Regarding additional diagnostic testing, Fatou is hesitant to agree to a CSF analysis.  Alternatively, she will agree to a neuropsychological evaluation and an eventual follow-up FDG PET scan in spring 2023.  I suggested close telephone and office follow-up.

## 2022-12-22 NOTE — REVIEW OF SYSTEMS
[Memory Lapses or Loss] : memory loss [Anxiety] : anxiety [Negative] : Heme/Lymph [de-identified] : Compulsive behaviors

## 2023-01-01 NOTE — H&P ADULT - VTE RISK ASSESSMENT
Your child has an upper respiratory infection (URI).  Any child with a cold or URI can worsen or the infection can migrate to the lungs.  Please observe your child closely at home.  Continue supportive care at home with suctioning and nasal saline.  Seek immediate medical care with any fever, difficulty or noisy breathing, trouble drinking, decreased urine, irritability or any other concerns you may have.     <<--- Click to launch

## 2023-01-12 ENCOUNTER — APPOINTMENT (OUTPATIENT)
Dept: FAMILY MEDICINE | Facility: CLINIC | Age: 78
End: 2023-01-12
Payer: MEDICARE

## 2023-01-12 VITALS
TEMPERATURE: 96.2 F | WEIGHT: 170 LBS | OXYGEN SATURATION: 98 % | RESPIRATION RATE: 16 BRPM | HEIGHT: 65 IN | SYSTOLIC BLOOD PRESSURE: 114 MMHG | DIASTOLIC BLOOD PRESSURE: 64 MMHG | HEART RATE: 84 BPM | BODY MASS INDEX: 28.32 KG/M2

## 2023-01-12 DIAGNOSIS — J06.9 ACUTE UPPER RESPIRATORY INFECTION, UNSPECIFIED: ICD-10-CM

## 2023-01-12 PROCEDURE — 99213 OFFICE O/P EST LOW 20 MIN: CPT

## 2023-01-17 LAB
BACTERIA THROAT CULT: NORMAL
RAPID RVP RESULT: NOT DETECTED
SARS-COV-2 RNA PNL RESP NAA+PROBE: NOT DETECTED

## 2023-01-18 PROBLEM — J06.9 ACUTE URI: Status: ACTIVE | Noted: 2022-10-19

## 2023-01-18 NOTE — PHYSICAL EXAM
[Normal] : the outer ears and nose were normal in appearance and the oropharynx was normal [Soft] : abdomen soft [Non Tender] : non-tender [de-identified] : INjected nasal turbinates

## 2023-02-10 NOTE — ASU PATIENT PROFILE, ADULT - PRO ARRIVE FROM
Notified EEG was within normal limits. Asked when was next appointment as she is still having trouble with her foot. Explained Ms Quan was scheduled or Botox, but cancelled. Soniya stated Ms Quan read up on side effects and did not want to receive Botox. Explained someone with front office staff would contact with an appointment with Dr Garcia.   home

## 2023-02-27 ENCOUNTER — NON-APPOINTMENT (OUTPATIENT)
Age: 78
End: 2023-02-27

## 2023-03-13 ENCOUNTER — APPOINTMENT (OUTPATIENT)
Dept: PLASTIC SURGERY | Facility: CLINIC | Age: 78
End: 2023-03-13
Payer: MEDICARE

## 2023-03-13 VITALS
WEIGHT: 170 LBS | HEIGHT: 65 IN | TEMPERATURE: 97.2 F | HEART RATE: 54 BPM | BODY MASS INDEX: 28.32 KG/M2 | SYSTOLIC BLOOD PRESSURE: 133 MMHG | OXYGEN SATURATION: 98 % | DIASTOLIC BLOOD PRESSURE: 78 MMHG

## 2023-03-13 DIAGNOSIS — Z12.39 ENCOUNTER FOR OTHER SCREENING FOR MALIGNANT NEOPLASM OF BREAST: ICD-10-CM

## 2023-03-13 DIAGNOSIS — N64.59 OTHER SIGNS AND SYMPTOMS IN BREAST: ICD-10-CM

## 2023-03-13 PROCEDURE — 99212 OFFICE O/P EST SF 10 MIN: CPT

## 2023-03-13 NOTE — HISTORY OF PRESENT ILLNESS
[FreeTextEntry1] : Patient is a 78 year old female here today for a follow up visit.\par 9/23/2020 Left areolar skin punch biopsy: Pathology revealed dermal hypersensitivity reaction. No evidence of malignancy.\par She then saw Derm Dr. De Leon who prescribed a cream.\par 10/28/2022 Bilateral mammogram/ultrasound : benign, BI-RADS 2\par She was recently diagnosed with Afib and has a loop recorder. She sees Dr. Mays and Dr. Dowell\par She denies any current breast concerns. She noted a rash underneath her breasts recently but it resolved with Nystatin/Triamcinolone cream.\par \par

## 2023-03-13 NOTE — CONSULT LETTER
[Dear  ___] : Dear  [unfilled], [Courtesy Letter:] : I had the pleasure of seeing your patient, [unfilled], in my office today. [Please see my note below.] : Please see my note below. [Sincerely,] : Sincerely, [DrEunice  ___] : Dr. MARTINEZ [FreeTextEntry3] : Susan M. Palleschi, MD, FACS\par Division of Breast Surgery\par Director, Breast Surgery\par [facility]\par [address] [place] [address]\par [address]\[place] [address]\par (Phone) [phone]\par (Fax) [phone]

## 2023-03-13 NOTE — PHYSICAL EXAM
[Normocephalic] : normocephalic [Atraumatic] : atraumatic [EOMI] : extra ocular movement intact [Sclera nonicteric] : sclera nonicteric [Supple] : supple [No Supraclavicular Adenopathy] : no supraclavicular adenopathy [No Cervical Adenopathy] : no cervical adenopathy [No Thyromegaly] : no thyromegaly [Examined in the supine and seated position] : examined in the supine and seated position [No dominant masses] : no dominant masses in right breast  [No dominant masses] : no dominant masses left breast [No Nipple Retraction] : no left nipple retraction [No Nipple Discharge] : no left nipple discharge [No Axillary Lymphadenopathy] : no left axillary lymphadenopathy [No Edema] : no edema [No Rashes] : no rashes [No Ulceration] : no ulceration [de-identified] : Left nipple recesses in supine but easily everted.

## 2023-03-13 NOTE — ASSESSMENT
[FreeTextEntry1] : H/O benign left areolar skin punch biopsy\par Clinical breast exam negative \par \par 1. Annual bilateral mammogram due 10/2023\par 2. Follow up office visit due as needed\par 3. Advised monthly self breast examinations and advised her to contact me if she has any concerns. \par \par Patient seen and examined with my PA Ximena Goodwin present

## 2023-03-13 NOTE — REVIEW OF SYSTEMS
[Negative] : Heme/Lymph [FreeTextEntry5] : Afib recently diagnosed, high blood pressure  [FreeTextEntry7] : Diverticulosis [FreeTextEntry9] : Arthritis in the knee [de-identified] : Diabetic

## 2023-03-17 ENCOUNTER — APPOINTMENT (OUTPATIENT)
Dept: FAMILY MEDICINE | Facility: CLINIC | Age: 78
End: 2023-03-17
Payer: MEDICARE

## 2023-03-17 VITALS
DIASTOLIC BLOOD PRESSURE: 69 MMHG | HEART RATE: 63 BPM | HEIGHT: 65 IN | OXYGEN SATURATION: 99 % | TEMPERATURE: 97.8 F | RESPIRATION RATE: 16 BRPM | BODY MASS INDEX: 27.99 KG/M2 | WEIGHT: 168 LBS | SYSTOLIC BLOOD PRESSURE: 138 MMHG

## 2023-03-17 PROCEDURE — 99214 OFFICE O/P EST MOD 30 MIN: CPT

## 2023-03-17 RX ORDER — DONEPEZIL HYDROCHLORIDE 10 MG/1
10 TABLET ORAL DAILY
Qty: 90 | Refills: 3 | Status: COMPLETED | COMMUNITY
Start: 2023-01-23 | End: 2023-03-17

## 2023-03-17 RX ORDER — DONEPEZIL HYDROCHLORIDE 10 MG/1
10 TABLET ORAL DAILY
Qty: 90 | Refills: 3 | Status: COMPLETED | COMMUNITY
Start: 2022-09-07 | End: 2023-03-17

## 2023-03-17 NOTE — ASSESSMENT
[FreeTextEntry1] : Assessment and plan:\par \par 1.  New onset atrial fibrillation patient will continue anticoagulation with Eliquis 5 mg twice a day, metoprolol tartrate and Multaq.  At today's visit it appears that the patient is in sinus.\par \par 2.  Hypertension blood pressure is stable patient will continue losartan 50 mg p.o. daily.\par \par 3.  Chronic obstructive pulmonary disease patient will continue present inhalers she is presently being treated for an upper respiratory tract infection with antibiotics prescribed by her pulmonologist.\par \par 4.  Diabetes mellitus type 2 non-insulin-requiring continue present medical management with metformin I will be checking hemoglobin A1c.\par \par 5.  Hyperlipidemia continue atorvastatin, low-fat low-cholesterol diet check lipid profile and comprehensive metabolic.\par \par 6.  Cognitive decline donepezil has been discontinued due to medications that were added for atrial fibrillation patient will continue sertraline which was prescribed by neurology I reviewed most recent consultation with neurology, cardiology and also breast surgery.\par \par 7.  Continue present medical management without change follow-up appointment in 3 to 4 months.  Okay

## 2023-03-17 NOTE — PHYSICAL EXAM
[No Acute Distress] : no acute distress [Well Nourished] : well nourished [Well Developed] : well developed [Well-Appearing] : well-appearing [Normal Voice/Communication] : normal voice/communication [Normal Sclera/Conjunctiva] : normal sclera/conjunctiva [PERRL] : pupils equal round and reactive to light [EOMI] : extraocular movements intact [Normal Outer Ear/Nose] : the outer ears and nose were normal in appearance [Normal Oropharynx] : the oropharynx was normal [Normal TMs] : both tympanic membranes were normal [Normal Nasal Mucosa] : the nasal mucosa was normal [No JVD] : no jugular venous distention [No Lymphadenopathy] : no lymphadenopathy [Supple] : supple [Thyroid Normal, No Nodules] : the thyroid was normal and there were no nodules present [No Respiratory Distress] : no respiratory distress  [No Accessory Muscle Use] : no accessory muscle use [Clear to Auscultation] : lungs were clear to auscultation bilaterally [Normal Rate] : normal rate  [Regular Rhythm] : with a regular rhythm [Normal S1, S2] : normal S1 and S2 [No Murmur] : no murmur heard [No Carotid Bruits] : no carotid bruits [No Abdominal Bruit] : a ~M bruit was not heard ~T in the abdomen [No Varicosities] : no varicosities [Pedal Pulses Present] : the pedal pulses are present [No Edema] : there was no peripheral edema [No Palpable Aorta] : no palpable aorta [No Extremity Clubbing/Cyanosis] : no extremity clubbing/cyanosis [Soft] : abdomen soft [Non Tender] : non-tender [Non-distended] : non-distended [No Masses] : no abdominal mass palpated [No HSM] : no HSM [Normal Bowel Sounds] : normal bowel sounds [No Hernias] : no hernias [No Spinal Tenderness] : no spinal tenderness [No CVA Tenderness] : no CVA  tenderness [No Joint Swelling] : no joint swelling [Grossly Normal Strength/Tone] : grossly normal strength/tone [No Rash] : no rash [No Skin Lesions] : no skin lesions [Coordination Grossly Intact] : coordination grossly intact [No Focal Deficits] : no focal deficits [Deep Tendon Reflexes (DTR)] : deep tendon reflexes were 2+ and symmetric [Normal Gait] : normal gait [Speech Grossly Normal] : speech grossly normal [Alert and Oriented x3] : oriented to person, place, and time [Normal Affect] : the affect was normal [Normal Mood] : the mood was normal [Normal Insight/Judgement] : insight and judgment were intact [de-identified] : gyn [FreeTextEntry1] : GI [de-identified] : GYN [de-identified] : Dystrophic nails that his fingernails secondary to chronic trauma, tinea corporis under both breasts and groin area. [de-identified] : forgetful

## 2023-03-17 NOTE — HISTORY OF PRESENT ILLNESS
[Family Member] : family member [FreeTextEntry1] : See HPI. [de-identified] : Patient is a 78-year-old female who presents today for follow-up and disease management medical history is significant for hypertension, COPD, diabetes mellitus type 2, cognitive dysfunction, hyperlipidemia, newly diagnosed atrial fibrillation.\par \par Presently the patient is awake alert and oriented x2 in no acute distress, cooperative patient is accompanied by her daughter.

## 2023-03-17 NOTE — HEALTH RISK ASSESSMENT
[No] : In the past 12 months have you used drugs other than those required for medical reasons? No [One fall no injury in past year] : Patient reported one fall in the past year without injury [Audit-CScore] : 0 [With Patient/Caregiver] : , with patient/caregiver [Reviewed no changes] : Reviewed, no changes [Designated Healthcare Proxy] : Designated healthcare proxy [Relationship: ___] : Relationship: [unfilled] [Aggressive treatment] : aggressive treatment [I will adhere to the patient's wishes.] : I will adhere to the patient's wishes. [AdvancecareDate] : 03/23 [Never] : Never

## 2023-03-31 ENCOUNTER — APPOINTMENT (OUTPATIENT)
Dept: NEUROLOGY | Facility: CLINIC | Age: 78
End: 2023-03-31
Payer: MEDICARE

## 2023-03-31 PROCEDURE — 96132 NRPSYC TST EVAL PHYS/QHP 1ST: CPT

## 2023-03-31 PROCEDURE — 96116 NUBHVL XM PHYS/QHP 1ST HR: CPT

## 2023-03-31 PROCEDURE — 96138 PSYCL/NRPSYC TECH 1ST: CPT | Mod: NC

## 2023-03-31 PROCEDURE — 96139 PSYCL/NRPSYC TST TECH EA: CPT | Mod: NC

## 2023-03-31 PROCEDURE — 96133 NRPSYC TST EVAL PHYS/QHP EA: CPT

## 2023-04-08 ENCOUNTER — EMERGENCY (EMERGENCY)
Facility: HOSPITAL | Age: 78
LOS: 1 days | Discharge: HOME | End: 2023-04-08
Attending: EMERGENCY MEDICINE | Admitting: INTERNAL MEDICINE
Payer: MEDICARE

## 2023-04-08 VITALS
WEIGHT: 154.98 LBS | HEIGHT: 66 IN | DIASTOLIC BLOOD PRESSURE: 85 MMHG | RESPIRATION RATE: 18 BRPM | SYSTOLIC BLOOD PRESSURE: 169 MMHG | OXYGEN SATURATION: 99 % | HEART RATE: 57 BPM | TEMPERATURE: 98 F

## 2023-04-08 DIAGNOSIS — Z90.49 ACQUIRED ABSENCE OF OTHER SPECIFIED PARTS OF DIGESTIVE TRACT: Chronic | ICD-10-CM

## 2023-04-08 PROCEDURE — 29550 STRAPPING OF TOES: CPT | Mod: T3

## 2023-04-08 PROCEDURE — 73660 X-RAY EXAM OF TOE(S): CPT

## 2023-04-08 PROCEDURE — 99284 EMERGENCY DEPT VISIT MOD MDM: CPT | Mod: 25

## 2023-04-08 PROCEDURE — 73660 X-RAY EXAM OF TOE(S): CPT | Mod: 26,LT

## 2023-04-08 PROCEDURE — 29515 APPLICATION SHORT LEG SPLINT: CPT | Mod: T3

## 2023-04-08 PROCEDURE — 99283 EMERGENCY DEPT VISIT LOW MDM: CPT

## 2023-04-08 NOTE — ED PROVIDER NOTE - CLINICAL SUMMARY MEDICAL DECISION MAKING FREE TEXT BOX
pt p.e toe swelling and pain on left 4th toe, unable to recall any injury, on exam, toe is edema and xray possible hairline fracture , edgar taped toe and advised to f/u podiatry

## 2023-04-08 NOTE — ED PROVIDER NOTE - PATIENT PORTAL LINK FT
You can access the FollowMyHealth Patient Portal offered by [facility] by registering at the following website: [email or web address]. By joining [email or web address]’s FollowMyHealth portal, you will also be able to view your health information using other applications (apps) compatible with our system.

## 2023-04-08 NOTE — ED PROVIDER NOTE - CARE PROVIDER_API CALL
Rusty Reid (DPM)  Podiatric Medicine and Surgery  [address], [address]  [place] [address]  Phone: [phone]  Fax: [phone]  Follow Up Time:

## 2023-04-08 NOTE — ED PROVIDER NOTE - NSFOLLOWUPINSTRUCTIONS_ED_ALL_ED_FT
Fracture    A fracture is a break in one of your bones. This can occur from a variety of injuries, especially traumatic ones. Symptoms include pain, bruising, or swelling. Do not use the injured limb. If a fracture is in one of the bones below your waist, do not put weight on that limb unless instructed to do so by your healthcare provider. Crutches or a cane may have been provided. A splint or cast may have been applied by your health care provider. Make sure to keep it dry and follow up with an orthopedist as instructed.  keep toe in edgar tape  follow up with podiatry in 2 days   SEEK IMMEDIATE MEDICAL CARE IF YOU HAVE ANY OF THE FOLLOWING SYMPTOMS: numbness, tingling, increasing pain, or weakness in any part of the injured limb.

## 2023-04-08 NOTE — ED PROVIDER NOTE - PHYSICAL EXAMINATION
General:     NAD, well-nourished, well-appearing  Head:     NC/AT, EOMI, oral mucosa moist  Neck:     supple  Lungs:     CTA b/l, no w/r/r  CVS:     S1S2, RRR, no m/g/r  Abd:     +BS, s/nt/nd, no organomegaly  Ext:    2+ radial and pedal pulses, no c/c/e  left foot 4th toe minimal edema and tenderness , no sign of infection.   Neuro: grossly intact

## 2023-04-08 NOTE — ED PROVIDER NOTE - OBJECTIVE STATEMENT
77 y/o F BIB daughter c/o 4th toe on left foot pain since today , unable to recall any injury, no fever, no redness of toe

## 2023-04-08 NOTE — ED ADULT NURSE REASSESSMENT NOTE - NS ED NURSE REASSESS COMMENT FT1
no edema,  no murmurs,  regular rate and rhythm
Pt declined offer for pain medication. Pain is tolerable.

## 2023-04-10 NOTE — CHART NOTE - NSCHARTNOTEFT_GEN_A_CORE
SW placed call to patient to discuss and assist with follow up care.  Patient presented to ED on 4/8/23 due to toe pain.  SW unable to reach patient, VM left requesting call back.

## 2023-04-27 ENCOUNTER — APPOINTMENT (OUTPATIENT)
Dept: NEUROLOGY | Facility: CLINIC | Age: 78
End: 2023-04-27
Payer: MEDICARE

## 2023-04-27 VITALS
HEART RATE: 67 BPM | DIASTOLIC BLOOD PRESSURE: 75 MMHG | SYSTOLIC BLOOD PRESSURE: 137 MMHG | BODY MASS INDEX: 28.32 KG/M2 | WEIGHT: 170 LBS | HEIGHT: 65 IN

## 2023-04-27 PROCEDURE — 99214 OFFICE O/P EST MOD 30 MIN: CPT

## 2023-04-27 NOTE — ASSESSMENT
[FreeTextEntry1] : Mrs. Otto is a 78-year-old with subacute dementia.  Her condition is gradually deteriorating over time but she remains relatively functional.  Donepezil was discontinued because of the use of Multaq.  I do long discussion with Fatou regarding management.  I do not believe that memantine will impact her condition significantly.  I discussed the possibility of obtaining a follow-up FDG PET scan to better define whether she is suffering from FTD or AD.  That study can be performed in the next several months.  In the meantime I suggested cautious observation.  She will return to the office in 6 months for routine follow-up.

## 2023-04-27 NOTE — PHYSICAL EXAM
[FreeTextEntry1] : Constitutional:  Patient was well-developed, well-nourished and in no acute distress. \par \par Head:  Normocephalic, atraumatic. Tympanic membranes were not examined. \par \par Neck:  Supple with full range of motion. \par \par Cardiovascular:  Cardiac rhythm was regular without murmur. There were no carotid bruits. Peripheral pulses were full and symmetric. \par \par Respiratory:  Lungs were clear. \par \par Abdomen:  Soft and nontender. \par \par Spine:  Nontender. \par \par Skin:  There were no rashes. \par \par NEUROLOGICAL EXAMINATION:\par \par Mental Status: She was alert and oriented to person and place but not date. Speech was fluent. There was no dysarthria.  She scored 21 out of 30 on MMSE, a one-point decline from her last visit.  She was very disoriented to the date.  She recalled none of 3 words after several minutes.\par \par Cranial Nerves: \par \par II: She could finger count bilaterally. Pupils were equal and reactive. Visual fields were full.  Fundi were normal.\par \par III, IV, VI:  Eye movements were full without nystagmus. \par \par V: Facial sensation was intact. \par \par VII: Facial strength was normal. \par \par VIII: Hearing was equal. \par \par IX, X: Palatal movement was normal. Phonation was normal. \par \par XI: Sternocleidomastoids and trapezii were normal. \par \par XII: Tongue was midline and movements normal. There was no lingual atrophy or fasciculations. \par \par Motor Examination: Muscle bulk, tone and strength were normal. \par \par Sensory Examination: Pinprick, vibration and joint position sense were intact. \par \par Reflexes: DTRs were 2 the biceps and absent elsewhere.\par \par Plantar Responses: Plantar responses were flexor. \par \par Coordination/Cerebellar Function: There was no dysmetria on finger to nose or heel to shin testing. \par \par Gait/Stance: Gait was stable.  Tandem was mildly unsteady. Romberg was negative.\par

## 2023-04-27 NOTE — HISTORY OF PRESENT ILLNESS
[FreeTextEntry1] : Mrs. Patrica Mane returned to the office having been last seen on December 22, 2022. She is a 78-year-old right-handed patient with mild cognitive impairment and a family history of dementia.  She was accompanied to the office by her daughter Fatou ([phone]).  She has been under my care since February 2020.\par \par At her last visit, she was relatively independent but had compulsive behaviors and was forgetful.  She scored 22 out of 30 on MMSE.  She was disoriented and had poor recall.  She was on donepezil 10 mg/day at the time.  A few months ago, she was diagnosed with atrial fibrillation and was begun on Multaq, apixaban and metoprolol.  The use of Multaq prompted the discontinuation of donepezil.\par \par According to Fatou, her condition is about the same.  She is a bit inappropriate and volatile.  She is able to dress, bathe and toilet herself.  There has been no deterioration in language function.  She is able to dress, bathe and toilet herself.\par \par FDG PET scan performed in October 2021 revealed abnormal hypometabolism particularly pronounced in the anterior temporal lobes bilaterally and also mild hypometabolism in the frontal regions consistent with an early neurodegenerative disorder a pattern suggestive of frontotemporal dementia.  The symmetric anterior temporal involvement and family history of dementia raised the possibility of an MAPT mutation frontotemporal dementia.There was preserved normal tracer uptake in the posterior cingulate gyri and only mild hypometabolism in the precuneus, regions typically involve early in the course of Alzheimer's disease.  An [employer] frontotemporal dementia panel including the MAPT gene was negative.\par \par She participated in a telemedicine visit with Dr. Temo Green, a neurologist at [facility].  He reviewed her FDG PET scan and felt that it was more consistent with Alzheimer's disease than frontotemporal dementia.  He suggested treatment with sertraline for her behavioral issues.  He also discussed additional diagnostic modalities including CSF analysis and a repeat FDG PET scan.  \par \par Medications include apixaban, Multaq, metoprolol, atorvastatin, Brovana, budesonide, losartan, vitamin D, multivitamin and PreserVision.\par \par

## 2023-04-27 NOTE — REVIEW OF SYSTEMS
[Memory Lapses or Loss] : memory loss [Anxiety] : anxiety [Negative] : Heme/Lymph [de-identified] : Compulsive behaviors

## 2023-06-14 ENCOUNTER — APPOINTMENT (OUTPATIENT)
Dept: NEUROLOGY | Facility: CLINIC | Age: 78
End: 2023-06-14
Payer: MEDICARE

## 2023-06-14 PROCEDURE — 96133 NRPSYC TST EVAL PHYS/QHP EA: CPT | Mod: 95

## 2023-07-19 ENCOUNTER — APPOINTMENT (OUTPATIENT)
Dept: ULTRASOUND IMAGING | Facility: CLINIC | Age: 78
End: 2023-07-19
Payer: MEDICARE

## 2023-07-19 ENCOUNTER — OUTPATIENT (OUTPATIENT)
Dept: OUTPATIENT SERVICES | Facility: HOSPITAL | Age: 78
LOS: 1 days | End: 2023-07-19
Payer: MEDICARE

## 2023-07-19 DIAGNOSIS — Z00.8 ENCOUNTER FOR OTHER GENERAL EXAMINATION: ICD-10-CM

## 2023-07-19 DIAGNOSIS — E11.9 TYPE 2 DIABETES MELLITUS WITHOUT COMPLICATIONS: ICD-10-CM

## 2023-07-19 DIAGNOSIS — Z90.49 ACQUIRED ABSENCE OF OTHER SPECIFIED PARTS OF DIGESTIVE TRACT: Chronic | ICD-10-CM

## 2023-07-19 PROCEDURE — 76536 US EXAM OF HEAD AND NECK: CPT

## 2023-07-19 PROCEDURE — 76536 US EXAM OF HEAD AND NECK: CPT | Mod: 26

## 2023-08-10 DIAGNOSIS — Z12.4 ENCOUNTER FOR SCREENING FOR MALIGNANT NEOPLASM OF CERVIX: ICD-10-CM

## 2023-08-10 DIAGNOSIS — Z12.39 ENCOUNTER FOR OTHER SCREENING FOR MALIGNANT NEOPLASM OF BREAST: ICD-10-CM

## 2023-08-11 ENCOUNTER — APPOINTMENT (OUTPATIENT)
Dept: FAMILY MEDICINE | Facility: CLINIC | Age: 78
End: 2023-08-11

## 2023-08-21 NOTE — ED ADULT NURSE NOTE - HOW OFTEN DO YOU HAVE A DRINK CONTAINING ALCOHOL?
[address] [facility] ED  EMERGENCY DEPARTMENT ENCOUNTER      Pt Name: Gerald Dangelo  MRN: [id number]  [address] [facility] [facility] [place] 1978  Date of evaluation: 8/21/2023  Provider: Kieran No       Chief Complaint   Patient presents with    Abdominal Pain     Patient states she has abdominal/hernia pain that started on Friday and pain has gotten worse since. Took over care of the patient at 0700 pending CT scan results. DIAGNOSTIC RESULTS     EKG: All EKG's are interpreted by the Emergency Department Physician who either signs or Co-signs this chart in the absence of a cardiologist.        RADIOLOGY:   Non-plain film images such as CT, Ultrasound and MRI are read by the radiologist. Plain radiographic images are visualized and preliminarily interpreted by the emergency physician with the below findings:        Interpretation per the Radiologist below, if available at the time of this note:    CT ABDOMEN PELVIS W IV CONTRAST Additional Contrast? None   Final Result   1. Cholelithiasis. 2. Intrahepatic biliary ductal dilatation and marked dilatation of the common   bile duct measuring 19 mm diameter compared to 9 mm in 2021. Suspect also   choledocholithiasis as discussed above. Suggest further evaluation with MRCP. 3. Wide diastasis of the rectus muscles with superimpose nonobstructed   ventral hernia containing multiple small and large bowel loops. Additionally   there is a 2nd smaller hernia to the left of the aforementioned also   containing nonobstructed bowel.                ED BEDSIDE ULTRASOUND:   Performed by ED Physician - none    LABS:  Labs Reviewed   CBC WITH AUTO DIFFERENTIAL - Abnormal; Notable for the following components:       Result Value    WBC 13.1 (*)     Neutrophils % 83 (*)     Lymphocytes % 7 (*)     Neutrophils Absolute 10.87 (*)     Lymphocytes Absolute 0.89 (*)     All other components within normal limits   BASIC METABOLIC PANEL - Abnormal; Notable for the following Never

## 2023-09-07 ENCOUNTER — APPOINTMENT (OUTPATIENT)
Dept: GERIATRICS | Facility: CLINIC | Age: 78
End: 2023-09-07
Payer: MEDICARE

## 2023-09-07 VITALS
SYSTOLIC BLOOD PRESSURE: 105 MMHG | HEIGHT: 65 IN | DIASTOLIC BLOOD PRESSURE: 68 MMHG | RESPIRATION RATE: 16 BRPM | WEIGHT: 171.13 LBS | OXYGEN SATURATION: 96 % | BODY MASS INDEX: 28.51 KG/M2 | HEART RATE: 71 BPM

## 2023-09-07 DIAGNOSIS — Z78.9 OTHER REDUCED MOBILITY: ICD-10-CM

## 2023-09-07 DIAGNOSIS — R45.1 RESTLESSNESS AND AGITATION: ICD-10-CM

## 2023-09-07 DIAGNOSIS — R26.89 OTHER ABNORMALITIES OF GAIT AND MOBILITY: ICD-10-CM

## 2023-09-07 DIAGNOSIS — Z74.09 OTHER REDUCED MOBILITY: ICD-10-CM

## 2023-09-07 PROCEDURE — 99205 OFFICE O/P NEW HI 60 MIN: CPT

## 2023-09-07 RX ORDER — NYSTATIN AND TRIAMCINOLONE ACETONIDE 100000; 1 MG/G; MG/G
100000-0.1 CREAM TOPICAL 3 TIMES DAILY
Qty: 1 | Refills: 3 | Status: DISCONTINUED | COMMUNITY
Start: 2022-12-15 | End: 2023-09-07

## 2023-09-07 RX ORDER — METFORMIN ER 500 MG 500 MG/1
500 TABLET ORAL EVERY MORNING
Refills: 0 | Status: ACTIVE | COMMUNITY
Start: 2023-09-07

## 2023-09-07 RX ORDER — METFORMIN HYDROCHLORIDE 500 MG/1
500 TABLET, COATED ORAL
Qty: 180 | Refills: 2 | Status: DISCONTINUED | COMMUNITY
Start: 2022-10-11 | End: 2023-09-07

## 2023-09-07 RX ORDER — VIT C/E/ZN/COPPR/LUTEIN/ZEAXAN 250MG-90MG
CAPSULE ORAL DAILY
Qty: 60 | Refills: 0 | Status: ACTIVE | COMMUNITY
Start: 2023-09-07

## 2023-09-07 RX ORDER — SERTRALINE 25 MG/1
25 TABLET, FILM COATED ORAL DAILY
Qty: 90 | Refills: 3 | Status: DISCONTINUED | COMMUNITY
Start: 2023-01-23 | End: 2023-09-07

## 2023-09-07 RX ORDER — FLUTICASONE PROPIONATE 50 UG/1
50 SPRAY, METERED NASAL TWICE DAILY
Qty: 1 | Refills: 2 | Status: DISCONTINUED | COMMUNITY
Start: 2022-12-16 | End: 2023-09-07

## 2023-09-07 RX ORDER — METOPROLOL SUCCINATE 25 MG/1
25 TABLET, EXTENDED RELEASE ORAL AT BEDTIME
Refills: 0 | Status: ACTIVE | COMMUNITY
Start: 2023-09-07

## 2023-09-07 RX ORDER — NYSTATIN 100000 [USP'U]/G
100000 CREAM TOPICAL 3 TIMES DAILY
Qty: 2 | Refills: 3 | Status: DISCONTINUED | COMMUNITY
Start: 2022-12-16 | End: 2023-09-07

## 2023-09-07 RX ORDER — APIXABAN 5 MG/1
5 TABLET, FILM COATED ORAL
Qty: 60 | Refills: 4 | Status: ACTIVE | COMMUNITY

## 2023-09-07 RX ORDER — TRIAMCINOLONE ACETONIDE 1 MG/G
0.1 CREAM TOPICAL 3 TIMES DAILY
Qty: 1 | Refills: 1 | Status: DISCONTINUED | COMMUNITY
Start: 2022-12-16 | End: 2023-09-07

## 2023-09-07 RX ORDER — SERTRALINE 25 MG/1
25 TABLET, FILM COATED ORAL DAILY
Qty: 90 | Refills: 1 | Status: DISCONTINUED | COMMUNITY
Start: 2022-12-22 | End: 2023-09-07

## 2023-09-07 RX ORDER — NYSTATIN 100000 1/G
100000 POWDER TOPICAL
Qty: 1 | Refills: 1 | Status: DISCONTINUED | COMMUNITY
Start: 2022-10-31 | End: 2023-09-07

## 2023-09-07 NOTE — HISTORY OF PRESENT ILLNESS
[Patient declined hearing screen] : Patient declined hearing screen [Any fall with injury in past year] : Patient reported fall with injury in the past year [Patient is independent with] : bathing [Independent] : transferring/mobility [Full assistance needed] : Assistance needed managing medications [] : Assistance needed managing finances. [Smoke Detector] : smoke detector [Carbon Monoxide Detector] : carbon monoxide detector [Grab Bars] : grab bars [Shower Chair] : shower chair [Wears Seat Belt] : wears seat belt [1] : 2) Feeling down, depressed, or hopeless for several days (1) [PHQ-2 Positive] : PHQ-2 Positive [I have developed a follow-up plan documented below in the note.] : I have developed a follow-up plan documented below in the note. [FreeTextEntry1] : PMD Dr. Harmeet Diop  Memory changes noted in "early 70s" - repetitive questions.   in .  Memory loss progressively worse.   Unclear about diagnosis - FTD vs AD per dtr.   Brother was Dx w/ Alzheimers disease after LP was done.  Symptoms started at 60 y/o,  in 80s.  Mother had "memory issues" in her 80s,  in 90s.   - Mood/behavior: repetitive questioning, verbally aggressive when gets upset, bangs the table but hasn't been physically aggressive towards other people Doesn't really seem happy the past few years, previously was in happy mood.  Doesn't go out or socialize much anymore.  Feels sad/depressed sometimes.  Gets upset about going to doctor appointments.  - Started on Lexapro 5mg daily by psych Dr. Miranda - recently established care  - Motor Syx: ambulates independently, slower  Fall approx  - slipped down wet stairs, bruised back but no fractures   - Sleep:  denies problems    - Appetite: decreased appetite, baseline wt in 180s   - Toileting: diarrhea occassionally when nervous/upset, UI +, FI occasiona, uses incontinence underwear    - Neuro Dr. Mathews - Neuro Dr. Green - Psych Dr. Miranda   - MOCA: - NPT w/ Dr. Meyers  - LABS: - MRI Brain - PET Brain FDG   - Was on Aricept - stopped d/t developed AFIB  AFIB / HTN / HLD  - Dr. Patterson - s/p loop recorder   DM - Controlled per dtr - Endo Dr. Del Real  ASTHMA - Pulm Dr. Yessica Brown  [TextBox_31] : denies problems  [BoneDensityDate] : 8/23 [TextBox_37] : wears reading glasses, Dr. Borges in [place] - cataracts, and MD [TextBox_19] : was due for colonoscopy in 2022 but held off d/t hospitalization d/t syncope episodes  [Guns at Home] : no guns at home [Night Light] : no night light [Driving Concerns] : not driving or driving without noted concerns [Getupandgo] : <12  [de-identified] : have cleaning help  [de-identified] : PHQ2 of 2 on 9/7/23 , on antidepressant [[id number]] : 2 [AdvancecareDate] : 9/7/23  [FreeTextEntry4] : HCP form at home - will bring to next visit

## 2023-09-07 NOTE — REASON FOR VISIT
[Initial Evaluation] : an initial evaluation [FreeTextEntry1] : dementia, ADC program [FreeTextEntry3] : derrick Lainez  [FreeTextEntry2] : who assists with history due to patient with baseline cognitive impairment

## 2023-09-07 NOTE — REVIEW OF SYSTEMS
[Incontinence] : incontinence [As Noted in HPI] : as noted in HPI [Negative] : Heme/Lymph [FreeTextEntry3] : wears reading glasses, Dr. Borges in [place] -  [FreeTextEntry9] : knee arthritis

## 2023-09-07 NOTE — ASSESSMENT
[FreeTextEntry1] : Available medical records reviewed   Will consider additional labs at next visit Recently started on Lexapro by psych  ADC program discussed and reading material provided  Plan for cognitive/mood assessment at next visit  -Referred to SW for additional counseling, education, support, resources  f/u within 1 mo, unless earlier PRN

## 2023-09-18 NOTE — ED ADULT NURSE NOTE - IN THE PAST 12 MONTHS HAVE YOU USED DRUGS OTHER THAN THOSE REQUIRED FOR MEDICAL REASON?
[facility] [address], [address] [place] [address] office: [phone] fax: [phone]      Physical Therapy: TREATMENT/PROGRESS NOTE   Patient: Prateek Christy (85 y.o. male)   Treatment Date: 2023   :  1999 MRN: [id number]   Visit #: 2   Insurance: Payor: [facility] / Plan: Ayush Medina / Product Type: *No Product type* /   Insurance ID: [id number] - ([facility])  Secondary Insurance (if applicable): A [employer]   Treatment Diagnosis:    No diagnosis found. Medical Diagnosis:      Complete tear of anterior cruciate ligament of left knee, initial encounter [P95.584E]   Referring Physician: Osa Seip, MD  PCP: No primary care provider on file. Plan of care signed (Y/N): yes    Date of Patient follow up with Physician: per MD     Progress Report/POC: EVAL today  PN due 10/13    POC update due (10 Rx/or 30 days whichever is less [address] [place] [address]): POC due        Visit # 133 [facility]   [address] []Yes    []No     Latex Allergy:  [x]NO      []YES  Preferred Language for Healthcare:   [x]English       []other:    SUBJECTIVE EXAMINATION     Patient Report/Comments: Knee feeling ok. Has been doing ok sleeping. Feels like swelling is getting a little better. Will see doc again in 2 weeks.      OBJECTIVE EXAMINATION     Observation:     Test measurements: see eval     Test used Initial score 2023   Pain Summary VAS 4-5/10    Functional questionnaire LEFS 90% deficit    ROM        See eval                Strength  See eval                        RESTRICTIONS/PRECAUTIONS: TROM use to avoid strain on MCL,  TTWB to start, progress ROM protecting MCL , progress ROM as tolerated while protecting MCL  reviewed figure 4 to avoid strain on MCL also TROM on for exercise until good quad control to avoid strain on MCL    Exercises/Interventions:     Therapeutic Ex (17342)
No

## 2023-10-03 ENCOUNTER — APPOINTMENT (OUTPATIENT)
Dept: GERIATRICS | Facility: CLINIC | Age: 78
End: 2023-10-03
Payer: MEDICARE

## 2023-10-03 VITALS
TEMPERATURE: 97.4 F | RESPIRATION RATE: 16 BRPM | SYSTOLIC BLOOD PRESSURE: 154 MMHG | HEART RATE: 67 BPM | BODY MASS INDEX: 28.55 KG/M2 | OXYGEN SATURATION: 99 % | HEIGHT: 65 IN | DIASTOLIC BLOOD PRESSURE: 72 MMHG | WEIGHT: 171.38 LBS

## 2023-10-03 DIAGNOSIS — R68.89 OTHER GENERAL SYMPTOMS AND SIGNS: ICD-10-CM

## 2023-10-03 DIAGNOSIS — R79.89 OTHER SPECIFIED ABNORMAL FINDINGS OF BLOOD CHEMISTRY: ICD-10-CM

## 2023-10-03 DIAGNOSIS — Z23 ENCOUNTER FOR IMMUNIZATION: ICD-10-CM

## 2023-10-03 DIAGNOSIS — Z13.29 ENCOUNTER FOR SCREENING FOR OTHER SUSPECTED ENDOCRINE DISORDER: ICD-10-CM

## 2023-10-03 DIAGNOSIS — E55.9 VITAMIN D DEFICIENCY, UNSPECIFIED: ICD-10-CM

## 2023-10-03 DIAGNOSIS — Z74.1 NEED FOR ASSISTANCE WITH PERSONAL CARE: ICD-10-CM

## 2023-10-03 DIAGNOSIS — Z87.898 PERSONAL HISTORY OF OTHER SPECIFIED CONDITIONS: ICD-10-CM

## 2023-10-03 DIAGNOSIS — Z11.59 ENCOUNTER FOR SCREENING FOR OTHER VIRAL DISEASES: ICD-10-CM

## 2023-10-03 DIAGNOSIS — Z13.21 ENCOUNTER FOR SCREENING FOR NUTRITIONAL DISORDER: ICD-10-CM

## 2023-10-03 PROCEDURE — 99483 ASSMT & CARE PLN PT COG IMP: CPT

## 2023-10-03 PROCEDURE — G0008: CPT | Mod: 59

## 2023-10-03 PROCEDURE — 90662 IIV NO PRSV INCREASED AG IM: CPT | Mod: 59

## 2023-10-03 RX ORDER — ESCITALOPRAM OXALATE 10 MG/1
10 TABLET ORAL DAILY
Qty: 30 | Refills: 3 | Status: ACTIVE | COMMUNITY
Start: 2023-09-07

## 2023-10-04 PROBLEM — R68.89 FORGETFULNESS: Status: RESOLVED | Noted: 2019-10-16 | Resolved: 2023-10-04

## 2023-10-04 PROBLEM — Z87.898 HISTORY OF MEMORY LOSS: Status: RESOLVED | Noted: 2019-12-18 | Resolved: 2023-10-04

## 2023-10-08 LAB
25(OH)D3 SERPL-MCNC: 24.4 NG/ML
ALBUMIN SERPL ELPH-MCNC: 4.4 G/DL
ALP BLD-CCNC: 96 U/L
ALT SERPL-CCNC: 11 U/L
ANION GAP SERPL CALC-SCNC: 11 MMOL/L
AST SERPL-CCNC: 16 U/L
BASOPHILS # BLD AUTO: 0.09 K/UL
BASOPHILS NFR BLD AUTO: 1 %
BILIRUB SERPL-MCNC: 2 MG/DL
BUN SERPL-MCNC: 18 MG/DL
CALCIUM SERPL-MCNC: 9.5 MG/DL
CHLORIDE SERPL-SCNC: 105 MMOL/L
CHOLEST SERPL-MCNC: 158 MG/DL
CO2 SERPL-SCNC: 27 MMOL/L
COVID-19 NUCLEOCAPSID  GAM ANTIBODY INTERPRETATION: POSITIVE
CREAT SERPL-MCNC: 0.96 MG/DL
EGFR: 61 ML/MIN/1.73M2
EOSINOPHIL # BLD AUTO: 0.2 K/UL
EOSINOPHIL NFR BLD AUTO: 2.3 %
ESTIMATED AVERAGE GLUCOSE: 143 MG/DL
FOLATE SERPL-MCNC: 8.3 NG/ML
GLUCOSE SERPL-MCNC: 115 MG/DL
HBA1C MFR BLD HPLC: 6.6 %
HCT VFR BLD CALC: 42.6 %
HDLC SERPL-MCNC: 57 MG/DL
HGB BLD-MCNC: 13.6 G/DL
IMM GRANULOCYTES NFR BLD AUTO: 0.2 %
LDLC SERPL CALC-MCNC: 83 MG/DL
LYMPHOCYTES # BLD AUTO: 1.75 K/UL
LYMPHOCYTES NFR BLD AUTO: 20.3 %
MAN DIFF?: NORMAL
MCHC RBC-ENTMCNC: 28 PG
MCHC RBC-ENTMCNC: 31.9 GM/DL
MCV RBC AUTO: 87.7 FL
MONOCYTES # BLD AUTO: 0.63 K/UL
MONOCYTES NFR BLD AUTO: 7.3 %
NEUTROPHILS # BLD AUTO: 5.93 K/UL
NEUTROPHILS NFR BLD AUTO: 68.9 %
NONHDLC SERPL-MCNC: 102 MG/DL
PLATELET # BLD AUTO: 290 K/UL
POTASSIUM SERPL-SCNC: 4 MMOL/L
PROT SERPL-MCNC: 6.3 G/DL
RBC # BLD: 4.86 M/UL
RBC # FLD: 14.6 %
SARS-COV-2 AB SERPL QL IA: 2.37 INDEX
SODIUM SERPL-SCNC: 143 MMOL/L
TRIGL SERPL-MCNC: 104 MG/DL
TSH SERPL-ACNC: 1.75 UIU/ML
VIT B1 SERPL-MCNC: 117.1 NMOL/L
VIT B12 SERPL-MCNC: 978 PG/ML
WBC # FLD AUTO: 8.62 K/UL

## 2023-10-26 ENCOUNTER — APPOINTMENT (OUTPATIENT)
Dept: GERIATRICS | Facility: CLINIC | Age: 78
End: 2023-10-26
Payer: MEDICARE

## 2023-10-26 VITALS
BODY MASS INDEX: 27.87 KG/M2 | OXYGEN SATURATION: 99 % | RESPIRATION RATE: 14 BRPM | TEMPERATURE: 97.2 F | DIASTOLIC BLOOD PRESSURE: 63 MMHG | WEIGHT: 167.5 LBS | SYSTOLIC BLOOD PRESSURE: 114 MMHG | HEART RATE: 61 BPM

## 2023-10-26 DIAGNOSIS — B37.0 CANDIDAL STOMATITIS: ICD-10-CM

## 2023-10-26 DIAGNOSIS — Z71.89 OTHER SPECIFIED COUNSELING: ICD-10-CM

## 2023-10-26 DIAGNOSIS — R22.2 LOCALIZED SWELLING, MASS AND LUMP, TRUNK: ICD-10-CM

## 2023-10-26 PROCEDURE — G2212 PROLONG OUTPT/OFFICE VIS: CPT

## 2023-10-26 PROCEDURE — 99497 ADVNCD CARE PLAN 30 MIN: CPT

## 2023-10-26 PROCEDURE — 99215 OFFICE O/P EST HI 40 MIN: CPT

## 2023-10-26 RX ORDER — GUAIFENESIN 100 MG/5ML
100 LIQUID ORAL EVERY 6 HOURS
Refills: 0 | Status: DISCONTINUED | COMMUNITY
Start: 2022-10-11 | End: 2023-10-26

## 2023-10-26 RX ORDER — NYSTATIN 100000 [USP'U]/ML
100000 SUSPENSION ORAL 4 TIMES DAILY
Qty: 140 | Refills: 0 | Status: ACTIVE | COMMUNITY
Start: 2023-10-26 | End: 1900-01-01

## 2023-10-26 RX ORDER — MULTIVITAMIN
TABLET ORAL
Refills: 0 | Status: DISCONTINUED | COMMUNITY
End: 2023-10-26

## 2023-10-26 RX ORDER — MULTIVIT-MIN/FOLIC/VIT K/LYCOP 400-300MCG
1000 TABLET ORAL
Refills: 0 | Status: DISCONTINUED | COMMUNITY
End: 2023-10-26

## 2023-10-26 RX ORDER — FLUOCINONIDE 0.5 MG/G
0.05 CREAM TOPICAL
Refills: 0 | Status: DISCONTINUED | COMMUNITY
End: 2023-10-26

## 2023-10-26 RX ORDER — METOPROLOL TARTRATE 75 MG/1
TABLET, FILM COATED ORAL
Refills: 0 | Status: DISCONTINUED | COMMUNITY
End: 2023-10-26

## 2023-10-27 PROBLEM — B37.0 ORAL CANDIDA: Status: ACTIVE | Noted: 2023-10-26

## 2023-10-27 PROBLEM — R22.2 MASS ON BACK: Status: ACTIVE | Noted: 2022-10-14

## 2023-10-27 PROBLEM — Z71.89 ADVANCE CARE PLANNING: Status: ACTIVE | Noted: 2023-09-07

## 2023-10-27 RX ORDER — DRONEDARONE 400 MG/1
400 TABLET, FILM COATED ORAL TWICE DAILY
Refills: 0 | Status: ACTIVE | COMMUNITY

## 2023-10-27 RX ORDER — ARFORMOTEROL TARTRATE 15 UG/2ML
15 SOLUTION RESPIRATORY (INHALATION) TWICE DAILY
Refills: 0 | Status: ACTIVE | COMMUNITY
Start: 2023-10-26

## 2023-11-06 ENCOUNTER — NON-APPOINTMENT (OUTPATIENT)
Age: 78
End: 2023-11-06

## 2023-11-07 ENCOUNTER — APPOINTMENT (OUTPATIENT)
Dept: CT IMAGING | Facility: CLINIC | Age: 78
End: 2023-11-07
Payer: SELF-PAY

## 2023-11-07 ENCOUNTER — OUTPATIENT (OUTPATIENT)
Dept: OUTPATIENT SERVICES | Facility: HOSPITAL | Age: 78
LOS: 1 days | End: 2023-11-07
Payer: SELF-PAY

## 2023-11-07 DIAGNOSIS — Z00.8 ENCOUNTER FOR OTHER GENERAL EXAMINATION: ICD-10-CM

## 2023-11-07 DIAGNOSIS — Z90.49 ACQUIRED ABSENCE OF OTHER SPECIFIED PARTS OF DIGESTIVE TRACT: Chronic | ICD-10-CM

## 2023-11-07 PROCEDURE — 75571 CT HRT W/O DYE W/CA TEST: CPT | Mod: 26

## 2023-11-07 PROCEDURE — 75571 CT HRT W/O DYE W/CA TEST: CPT

## 2023-12-05 ENCOUNTER — APPOINTMENT (OUTPATIENT)
Dept: OBGYN | Facility: CLINIC | Age: 78
End: 2023-12-05
Payer: MEDICARE

## 2023-12-05 ENCOUNTER — LABORATORY RESULT (OUTPATIENT)
Age: 78
End: 2023-12-05

## 2023-12-05 ENCOUNTER — ASOB RESULT (OUTPATIENT)
Age: 78
End: 2023-12-05

## 2023-12-05 VITALS
BODY MASS INDEX: 28.32 KG/M2 | HEIGHT: 65 IN | DIASTOLIC BLOOD PRESSURE: 73 MMHG | WEIGHT: 170 LBS | SYSTOLIC BLOOD PRESSURE: 115 MMHG

## 2023-12-05 DIAGNOSIS — B37.2 CANDIDIASIS OF SKIN AND NAIL: ICD-10-CM

## 2023-12-05 DIAGNOSIS — Z01.419 ENCOUNTER FOR GYNECOLOGICAL EXAMINATION (GENERAL) (ROUTINE) W/OUT ABNORMAL FINDINGS: ICD-10-CM

## 2023-12-05 PROCEDURE — G0101: CPT | Mod: GA

## 2023-12-05 PROCEDURE — G0328 FECAL BLOOD SCRN IMMUNOASSAY: CPT | Mod: QW

## 2023-12-05 RX ORDER — NYSTATIN 100000 1/G
100000 POWDER TOPICAL
Qty: 1 | Refills: 1 | Status: ACTIVE | COMMUNITY
Start: 2023-12-05 | End: 1900-01-01

## 2023-12-06 LAB
APPEARANCE: ABNORMAL
BILIRUBIN URINE: NEGATIVE
BLOOD URINE: NEGATIVE
COLOR: YELLOW
GLUCOSE QUALITATIVE U: NEGATIVE MG/DL
HPV HIGH+LOW RISK DNA PNL CVX: NOT DETECTED
KETONES URINE: ABNORMAL MG/DL
LEUKOCYTE ESTERASE URINE: ABNORMAL
NITRITE URINE: NEGATIVE
PH URINE: 5.5
PROTEIN URINE: NORMAL MG/DL
SPECIFIC GRAVITY URINE: 1.03
UROBILINOGEN URINE: 0.2 MG/DL

## 2023-12-08 LAB — BACTERIA UR CULT: NORMAL

## 2023-12-09 LAB — CYTOLOGY CVX/VAG DOC THIN PREP: ABNORMAL

## 2023-12-11 ENCOUNTER — APPOINTMENT (OUTPATIENT)
Dept: GERIATRICS | Facility: CLINIC | Age: 78
End: 2023-12-11
Payer: MEDICARE

## 2023-12-11 PROCEDURE — 99443: CPT | Mod: 95

## 2023-12-12 ENCOUNTER — NON-APPOINTMENT (OUTPATIENT)
Age: 78
End: 2023-12-12

## 2023-12-19 ENCOUNTER — APPOINTMENT (OUTPATIENT)
Dept: FAMILY MEDICINE | Facility: CLINIC | Age: 78
End: 2023-12-19
Payer: MEDICARE

## 2023-12-19 VITALS
RESPIRATION RATE: 14 BRPM | TEMPERATURE: 97.2 F | DIASTOLIC BLOOD PRESSURE: 70 MMHG | BODY MASS INDEX: 28.49 KG/M2 | HEART RATE: 52 BPM | HEIGHT: 65 IN | SYSTOLIC BLOOD PRESSURE: 115 MMHG | WEIGHT: 171 LBS | OXYGEN SATURATION: 99 %

## 2023-12-19 DIAGNOSIS — R25.1 TREMOR, UNSPECIFIED: ICD-10-CM

## 2023-12-19 PROCEDURE — 36415 COLL VENOUS BLD VENIPUNCTURE: CPT

## 2023-12-19 PROCEDURE — G0439: CPT

## 2023-12-19 NOTE — HEALTH RISK ASSESSMENT
[Very Good] : ~his/her~ current health as very good [Fair] :  ~his/her~ mood as fair [Never (0 pts)] : Never (0 points) [No] : In the past 12 months have you used drugs other than those required for medical reasons? No [No falls in past year] : Patient reported no falls in the past year [I have developed a follow-up plan documented below in the note.] : I have developed a follow-up plan documented below in the note. [Audit-CScore] : 0 [HIV test declined] : HIV test declined [Change in mental status noted] : No change in mental status noted [Language] : denies difficulty with language [Behavior] : difficulty with behavior [Learning/Retaining New Information] : difficulty learning/retaining new information [Handling Complex Tasks] : difficulty handling complex tasks [Reasoning] : difficulty with reasoning [Spatial Ability and Orientation] : difficulty with spatial ability and orientation [None] : None [With Family] : lives with family [Retired] : retired [] :  [# Of Children ___] : has [unfilled] children [Sexually Active] : not sexually active [Feels Safe at Home] : Feels safe at home [Independent] : feeding [Some assistance needed] : using telephone [Full assistance needed] : managing finances [Reports changes in hearing] : Reports no changes in hearing [Reports changes in vision] : Reports no changes in vision [Reports normal functional visual acuity (ie: able to read med bottle)] : Reports poor functional visual acuity.  [Reports changes in dental health] : Reports no changes in dental health [Smoke Detector] : smoke detector [Carbon Monoxide Detector] : carbon monoxide detector [Guns at Home] : no guns at home [Safety elements used in home] : safety elements used in home [Seat Belt] :  uses seat belt [Sunscreen] : uses sunscreen [Travel to Developing Areas] : does not  travel to developing areas [TB Exposure] : is not being exposed to tuberculosis [Caregiver Concerns] : does not have caregiver concerns [de-identified] : glasses [Reviewed no changes] : Reviewed, no changes [Designated Healthcare Proxy] : Designated healthcare proxy [Name: ___] : Health Care Proxy's Name: [unfilled]  [Relationship: ___] : Relationship: [unfilled] [Aggressive treatment] : aggressive treatment [I will adhere to the patient's wishes.] : I will adhere to the patient's wishes. [AdvancecareDate] : 12/23 [Never] : Never

## 2023-12-19 NOTE — COUNSELING
[Fall prevention counseling provided] : Fall prevention counseling provided [Adequate lighting] : Adequate lighting [No throw rugs] : No throw rugs [Use proper foot wear] : Use proper foot wear [Behavioral health counseling provided] : Behavioral health counseling provided [Sleep ___ hours/day] : Sleep [unfilled] hours/day [Engage in a relaxing activity] : Engage in a relaxing activity [AUDIT-C Screening administered and reviewed] : AUDIT-C Screening administered and reviewed [Limited decision making ability] : Limited decision making ability [Needs reinforcement, provided] : Patient needs reinforcement on understanding of lifestyle changes and steps needed to achieve self management goal; reinforcement was provided

## 2023-12-19 NOTE — HISTORY OF PRESENT ILLNESS
[Family Member] : family member [FreeTextEntry1] : Annual wellness exam. [de-identified] : Patient is a 78-year-old female who presents today for annual wellness exam patient's medical history is significant for cognitive dysfunction, anxiety, depression, atrial fibrillation, hypertension, chronic obstructive pulmonary disease, diabetes mellitus type 2 and hypercholesterolemia.  Patient recently underwent genetic testing done by Dr. Ga regarding neurology also had PET brain FDG dementia and diagnosed with frontotemporal dementia and was also seen at [facility].  Presently the patient is awake alert and oriented x 2 no acute distress accompanied by family member her daughter.

## 2023-12-19 NOTE — PHYSICAL EXAM
[No Acute Distress] : no acute distress [Well Nourished] : well nourished [Well Developed] : well developed [Well-Appearing] : well-appearing [Normal Voice/Communication] : normal voice/communication [Normal Sclera/Conjunctiva] : normal sclera/conjunctiva [PERRL] : pupils equal round and reactive to light [EOMI] : extraocular movements intact [Normal Outer Ear/Nose] : the outer ears and nose were normal in appearance [Normal Oropharynx] : the oropharynx was normal [Normal TMs] : both tympanic membranes were normal [Normal Nasal Mucosa] : the nasal mucosa was normal [No JVD] : no jugular venous distention [No Lymphadenopathy] : no lymphadenopathy [Supple] : supple [Thyroid Normal, No Nodules] : the thyroid was normal and there were no nodules present [No Respiratory Distress] : no respiratory distress  [No Accessory Muscle Use] : no accessory muscle use [Clear to Auscultation] : lungs were clear to auscultation bilaterally [Normal Rate] : normal rate  [Regular Rhythm] : with a regular rhythm [Normal S1, S2] : normal S1 and S2 [No Murmur] : no murmur heard [No Carotid Bruits] : no carotid bruits [No Abdominal Bruit] : a ~M bruit was not heard ~T in the abdomen [No Varicosities] : no varicosities [Pedal Pulses Present] : the pedal pulses are present [No Edema] : there was no peripheral edema [No Palpable Aorta] : no palpable aorta [No Extremity Clubbing/Cyanosis] : no extremity clubbing/cyanosis [Soft] : abdomen soft [Non Tender] : non-tender [Non-distended] : non-distended [No Masses] : no abdominal mass palpated [No HSM] : no HSM [Normal Bowel Sounds] : normal bowel sounds [No Hernias] : no hernias [No CVA Tenderness] : no CVA  tenderness [No Spinal Tenderness] : no spinal tenderness [No Joint Swelling] : no joint swelling [Grossly Normal Strength/Tone] : grossly normal strength/tone [No Rash] : no rash [No Skin Lesions] : no skin lesions [Coordination Grossly Intact] : coordination grossly intact [No Focal Deficits] : no focal deficits [Normal Gait] : normal gait [Deep Tendon Reflexes (DTR)] : deep tendon reflexes were 2+ and symmetric [Speech Grossly Normal] : speech grossly normal [Normal Affect] : the affect was normal [Alert and Oriented x3] : oriented to person, place, and time [Normal Mood] : the mood was normal [Normal Insight/Judgement] : insight and judgment were intact [de-identified] : gyn [FreeTextEntry1] : GI [de-identified] : GYN [de-identified] : Dystrophic nails that his fingernails secondary to chronic trauma, tinea corporis under both breasts and groin area. [de-identified] : forgetful

## 2023-12-19 NOTE — ASSESSMENT
[FreeTextEntry1] : Assessment and plan:  1.  Comprehensive health maintenance physical exam shows no acute findings stable for patient.  2.  Electrocardiogram recently done by cardiology we will request copy.  3.  Comprehensive blood work drawn in office by examiner.  4.  Anxiety/depression patient will continue Lexapro 10 mg daily tolerating medications without ill side effects.  5.  Atrial fibrillation rate is well-controlled continue anticoagulation with Eliquis 5 mg p.o. twice a day.  6.  Hypertension today's blood pressure excellent at 115/70 therefore patient will continue present medical management which includes losartan 50 mg p.o. daily and metoprolol succinate 25 mg 1 tablet at bedtime which also helps with rate control  7.  COPD presently well-controlled physical exam was excellent  8.  Diabetes mellitus type 2 non-insulin-requiring continue metformin patient tolerating medications no ill side effects check hemoglobin A1c.  9.  Hypercholesterolemia continue present diet and atorvastatin 40 mg p.o. daily.  10.  No change in medical management continue all medications as prescribed neck step will be determined by results of blood work patient is followed by multiple subspecialist reviewed all consultations except cardiology not available to me at this time.

## 2023-12-20 ENCOUNTER — NON-APPOINTMENT (OUTPATIENT)
Age: 78
End: 2023-12-20

## 2023-12-20 LAB
24R-OH-CALCIDIOL SERPL-MCNC: 43.4 PG/ML
ALBUMIN SERPL ELPH-MCNC: 4.5 G/DL
ALP BLD-CCNC: 109 U/L
ALT SERPL-CCNC: 14 U/L
ANION GAP SERPL CALC-SCNC: 16 MMOL/L
AST SERPL-CCNC: 19 U/L
BASOPHILS # BLD AUTO: 0.1 K/UL
BASOPHILS NFR BLD AUTO: 1.3 %
BILIRUB SERPL-MCNC: 2 MG/DL
BUN SERPL-MCNC: 19 MG/DL
CALCIUM SERPL-MCNC: 9.3 MG/DL
CHLORIDE SERPL-SCNC: 101 MMOL/L
CHOLEST SERPL-MCNC: 174 MG/DL
CO2 SERPL-SCNC: 25 MMOL/L
CREAT SERPL-MCNC: 1.15 MG/DL
EGFR: 49 ML/MIN/1.73M2
EOSINOPHIL # BLD AUTO: 0.23 K/UL
EOSINOPHIL NFR BLD AUTO: 3 %
ESTIMATED AVERAGE GLUCOSE: 140 MG/DL
GLUCOSE SERPL-MCNC: 82 MG/DL
HBA1C MFR BLD HPLC: 6.5 %
HCT VFR BLD CALC: 42.9 %
HDLC SERPL-MCNC: 58 MG/DL
HGB BLD-MCNC: 14.1 G/DL
IMM GRANULOCYTES NFR BLD AUTO: 0.3 %
LDLC SERPL CALC-MCNC: 95 MG/DL
LYMPHOCYTES # BLD AUTO: 1.51 K/UL
LYMPHOCYTES NFR BLD AUTO: 19.4 %
MAN DIFF?: NORMAL
MCHC RBC-ENTMCNC: 28.3 PG
MCHC RBC-ENTMCNC: 32.9 GM/DL
MCV RBC AUTO: 86 FL
MONOCYTES # BLD AUTO: 0.54 K/UL
MONOCYTES NFR BLD AUTO: 6.9 %
NEUTROPHILS # BLD AUTO: 5.39 K/UL
NEUTROPHILS NFR BLD AUTO: 69.1 %
NONHDLC SERPL-MCNC: 116 MG/DL
PLATELET # BLD AUTO: 307 K/UL
POTASSIUM SERPL-SCNC: 4.9 MMOL/L
PROT SERPL-MCNC: 6.5 G/DL
RBC # BLD: 4.99 M/UL
RBC # FLD: 14.3 %
SODIUM SERPL-SCNC: 141 MMOL/L
TRIGL SERPL-MCNC: 118 MG/DL
TSH SERPL-ACNC: 2.47 UIU/ML
WBC # FLD AUTO: 7.79 K/UL

## 2024-01-02 ENCOUNTER — NON-APPOINTMENT (OUTPATIENT)
Age: 79
End: 2024-01-02

## 2024-01-15 ENCOUNTER — NON-APPOINTMENT (OUTPATIENT)
Age: 79
End: 2024-01-15

## 2024-01-28 ENCOUNTER — NON-APPOINTMENT (OUTPATIENT)
Age: 79
End: 2024-01-28

## 2024-01-29 ENCOUNTER — APPOINTMENT (OUTPATIENT)
Dept: NEUROLOGY | Facility: CLINIC | Age: 79
End: 2024-01-29
Payer: MEDICARE

## 2024-01-29 VITALS — SYSTOLIC BLOOD PRESSURE: 113 MMHG | HEART RATE: 71 BPM | DIASTOLIC BLOOD PRESSURE: 71 MMHG

## 2024-01-29 DIAGNOSIS — G31.84 MILD COGNITIVE IMPAIRMENT, SO STATED: ICD-10-CM

## 2024-01-29 PROCEDURE — 99214 OFFICE O/P EST MOD 30 MIN: CPT

## 2024-01-29 NOTE — REVIEW OF SYSTEMS
[Memory Lapses or Loss] : memory loss [Anxiety] : anxiety [Negative] : Heme/Lymph [de-identified] : Compulsive behaviors

## 2024-01-29 NOTE — PHYSICAL EXAM
[FreeTextEntry1] : Constitutional:  Patient was well-developed, well-nourished and in no acute distress.   Head:  Normocephalic, atraumatic. Tympanic membranes were not examined.   Neck:  Supple with full range of motion.   Cardiovascular:  Cardiac rhythm was regular without murmur. There were no carotid bruits. Peripheral pulses were full and symmetric.   Respiratory:  Lungs were clear.   Abdomen:  Soft and nontender.   Spine:  Nontender.   Skin:  There were no rashes.   NEUROLOGICAL EXAMINATION:  Mental Status: She was alert and oriented to person and place but not date. Speech was fluent. There was no dysarthria.  She scored 23 out of 30 on MMSE, a 2-point improvement from her last visit.  She made 3 errors in orientation to date and 1 in location.  She recalled none of 3 words after several minutes.  Cranial Nerves:   II: She could finger count bilaterally. Pupils were equal and reactive. Visual fields were full.  Fundi were normal.  III, IV, VI:  Eye movements were full without nystagmus.   V: Facial sensation was intact.   VII: Facial strength was normal.   VIII: Hearing was equal.   IX, X: Palatal movement was normal. Phonation was normal.   XI: Sternocleidomastoids and trapezii were normal.   XII: Tongue was midline and movements normal. There was no lingual atrophy or fasciculations.   Motor Examination: Muscle bulk, tone and strength were normal.   Sensory Examination: Pinprick, vibration and joint position sense were intact.   Reflexes: DTRs were 2 the biceps and absent elsewhere.  Plantar Responses: Plantar responses were flexor.   Coordination/Cerebellar Function: There was no dysmetria on finger to nose or heel to shin testing.   Gait/Stance: Gait was stable.  Tandem was mildly unsteady. Romberg was negative.

## 2024-01-29 NOTE — ASSESSMENT
[FreeTextEntry1] : Mrs. 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  Since her last visit in April 2023, there is been no clinical deterioration.  She seems to be doing fairly well.  I note that she is now on escitalopram.In view of her clinical stability, I suggested cautious observation.  She will return to the office in fall 2024 for routine follow-up.  Telephone contact will be maintained in the meantime.

## 2024-01-29 NOTE — HISTORY OF PRESENT ILLNESS
[FreeTextEntry1] : Mrs. Patrica Mane returned to the office having been last seen on April 27, 2023. She is a 78-year-old right-handed patient with mild cognitive impairment and a family history of dementia.  She was accompanied to the office by her daughter Fatou ([phone]).  She has been under my care since February 2020.  At her December 2022 visit, she was relatively independent but had compulsive behaviors and was forgetful.  She scored 22 out of 30 on MMSE.  She was disoriented and had poor recall.  She was on donepezil 10 mg/day at the time.  A few months before, she was diagnosed with atrial fibrillation and was begun on Multaq, apixaban and metoprolol.  The use of Multaq prompted the discontinuation of donepezil.  At her April 2023 visit, her condition is about the same according to her daughter Fatou with whom she resided.  She was a bit inappropriate and volatile.  She was able to dress, bathe and toilet herself.  There had been no deterioration in language function.    FDG PET scan performed in October 2021 revealed abnormal hypometabolism particularly pronounced in the anterior temporal lobes bilaterally and also mild hypometabolism in the frontal regions consistent with an early neurodegenerative disorder a pattern suggestive of frontotemporal dementia.  The symmetric anterior temporal involvement and family history of dementia raised the possibility of an MAPT mutation frontotemporal dementia.There was preserved normal tracer uptake in the posterior cingulate gyri and only mild hypometabolism in the precuneus, regions typically involve early in the course of Alzheimer's disease.  An [employer] frontotemporal dementia panel including the MAPT gene was negative.  She participated in a telemedicine visit with Dr. Temo Green, a neurologist at [facility].  He reviewed her FDG PET scan and felt that it was more consistent with Alzheimer's disease than frontotemporal dementia.  He suggested treatment with sertraline for her behavioral issues.  He also discussed additional diagnostic modalities including CSF analysis and a repeat FDG PET scan.    She was accompanied to the office today by her daughter Nikole ([phone]).  She continues to reside with Fatou.  She is doing well.  She is a bit repetitive but is able to dress, bathe and toilet herself.  She has no difficulties with sleep, sphincteric function or gait.  There has been no obvious cognitive decline.  Medications include apixaban, Multaq, metoprolol, atorvastatin, Brovana, budesonide, losartan, metformin, ProAir, vitamin D, multivitamin and PreserVision.

## 2024-02-23 NOTE — DISCHARGE NOTE PROVIDER - CARE PROVIDERS DIRECT ADDRESSES
,DirectAddress_Unknown,DirectAddress_Unknown,[email or web address],DirectAddress_Unknown
06-Mar-2024

## 2024-02-27 ENCOUNTER — APPOINTMENT (OUTPATIENT)
Dept: RADIOLOGY | Facility: CLINIC | Age: 79
End: 2024-02-27
Payer: MEDICARE

## 2024-02-27 ENCOUNTER — OUTPATIENT (OUTPATIENT)
Dept: OUTPATIENT SERVICES | Facility: HOSPITAL | Age: 79
LOS: 1 days | End: 2024-02-27
Payer: MEDICARE

## 2024-02-27 ENCOUNTER — RX RENEWAL (OUTPATIENT)
Age: 79
End: 2024-02-27

## 2024-02-27 DIAGNOSIS — Z01.419 ENCOUNTER FOR GYNECOLOGICAL EXAMINATION (GENERAL) (ROUTINE) WITHOUT ABNORMAL FINDINGS: ICD-10-CM

## 2024-02-27 DIAGNOSIS — Z90.49 ACQUIRED ABSENCE OF OTHER SPECIFIED PARTS OF DIGESTIVE TRACT: Chronic | ICD-10-CM

## 2024-02-27 PROCEDURE — 77080 DXA BONE DENSITY AXIAL: CPT

## 2024-02-27 PROCEDURE — 77080 DXA BONE DENSITY AXIAL: CPT | Mod: 26

## 2024-02-27 RX ORDER — LOSARTAN POTASSIUM 50 MG/1
50 TABLET, FILM COATED ORAL
Qty: 90 | Refills: 3 | Status: ACTIVE | COMMUNITY
Start: 2019-10-16 | End: 1900-01-01

## 2024-03-04 ENCOUNTER — APPOINTMENT (OUTPATIENT)
Dept: GERIATRICS | Facility: CLINIC | Age: 79
End: 2024-03-04
Payer: MEDICARE

## 2024-03-04 PROCEDURE — 99443: CPT | Mod: 93

## 2024-03-05 ENCOUNTER — NON-APPOINTMENT (OUTPATIENT)
Age: 79
End: 2024-03-05

## 2024-03-12 ENCOUNTER — NON-APPOINTMENT (OUTPATIENT)
Age: 79
End: 2024-03-12

## 2024-03-18 ENCOUNTER — APPOINTMENT (OUTPATIENT)
Dept: FAMILY MEDICINE | Facility: CLINIC | Age: 79
End: 2024-03-18
Payer: MEDICARE

## 2024-03-18 VITALS
OXYGEN SATURATION: 99 % | HEIGHT: 65 IN | DIASTOLIC BLOOD PRESSURE: 71 MMHG | BODY MASS INDEX: 28.82 KG/M2 | RESPIRATION RATE: 14 BRPM | TEMPERATURE: 97.3 F | WEIGHT: 173 LBS | SYSTOLIC BLOOD PRESSURE: 121 MMHG | HEART RATE: 61 BPM

## 2024-03-18 DIAGNOSIS — E11.9 TYPE 2 DIABETES MELLITUS W/OUT COMPLICATIONS: ICD-10-CM

## 2024-03-18 DIAGNOSIS — R41.89 OTHER SYMPTOMS AND SIGNS INVOLVING COGNITIVE FUNCTIONS AND AWARENESS: ICD-10-CM

## 2024-03-18 DIAGNOSIS — E78.00 PURE HYPERCHOLESTEROLEMIA, UNSPECIFIED: ICD-10-CM

## 2024-03-18 DIAGNOSIS — J44.9 CHRONIC OBSTRUCTIVE PULMONARY DISEASE, UNSPECIFIED: ICD-10-CM

## 2024-03-18 DIAGNOSIS — I48.91 UNSPECIFIED ATRIAL FIBRILLATION: ICD-10-CM

## 2024-03-18 DIAGNOSIS — E80.6 OTHER DISORDERS OF BILIRUBIN METABOLISM: ICD-10-CM

## 2024-03-18 DIAGNOSIS — I10 ESSENTIAL (PRIMARY) HYPERTENSION: ICD-10-CM

## 2024-03-18 PROCEDURE — 99215 OFFICE O/P EST HI 40 MIN: CPT

## 2024-03-18 NOTE — REVIEW OF SYSTEMS
[Headache] : no headache [Dizziness] : no dizziness [Confusion] : no confusion [Fainting] : no fainting [Unsteady Walking] : no ataxia [Memory Loss] : memory loss [Negative] : Heme/Lymph

## 2024-03-18 NOTE — COUNSELING
[Fall prevention counseling provided] : Fall prevention counseling provided [Adequate lighting] : Adequate lighting [No throw rugs] : No throw rugs [Use proper foot wear] : Use proper foot wear [Behavioral health counseling provided] : Behavioral health counseling provided [Engage in a relaxing activity] : Engage in a relaxing activity [Sleep ___ hours/day] : Sleep [unfilled] hours/day [Plan in advance] : Plan in advance [AUDIT-C Screening administered and reviewed] : AUDIT-C Screening administered and reviewed [None] : None [Needs reinforcement, provided] : Patient needs reinforcement on understanding of lifestyle changes and steps needed to achieve self management goal; reinforcement was provided

## 2024-03-18 NOTE — PHYSICAL EXAM
[No Acute Distress] : no acute distress [Well Developed] : well developed [Well Nourished] : well nourished [Normal Voice/Communication] : normal voice/communication [Well-Appearing] : well-appearing [Normal Sclera/Conjunctiva] : normal sclera/conjunctiva [EOMI] : extraocular movements intact [PERRL] : pupils equal round and reactive to light [Normal Outer Ear/Nose] : the outer ears and nose were normal in appearance [Normal Oropharynx] : the oropharynx was normal [Normal TMs] : both tympanic membranes were normal [Normal Nasal Mucosa] : the nasal mucosa was normal [No Lymphadenopathy] : no lymphadenopathy [No JVD] : no jugular venous distention [Thyroid Normal, No Nodules] : the thyroid was normal and there were no nodules present [Supple] : supple [No Respiratory Distress] : no respiratory distress  [No Accessory Muscle Use] : no accessory muscle use [Clear to Auscultation] : lungs were clear to auscultation bilaterally [Normal Rate] : normal rate  [Regular Rhythm] : with a regular rhythm [Normal S1, S2] : normal S1 and S2 [No Murmur] : no murmur heard [No Carotid Bruits] : no carotid bruits [No Varicosities] : no varicosities [No Abdominal Bruit] : a ~M bruit was not heard ~T in the abdomen [Pedal Pulses Present] : the pedal pulses are present [No Palpable Aorta] : no palpable aorta [No Edema] : there was no peripheral edema [No Extremity Clubbing/Cyanosis] : no extremity clubbing/cyanosis [Soft] : abdomen soft [Non Tender] : non-tender [Non-distended] : non-distended [No Masses] : no abdominal mass palpated [No HSM] : no HSM [Normal Bowel Sounds] : normal bowel sounds [No Hernias] : no hernias [No CVA Tenderness] : no CVA  tenderness [No Spinal Tenderness] : no spinal tenderness [No Joint Swelling] : no joint swelling [Grossly Normal Strength/Tone] : grossly normal strength/tone [No Rash] : no rash [No Skin Lesions] : no skin lesions [No Focal Deficits] : no focal deficits [Coordination Grossly Intact] : coordination grossly intact [Normal Gait] : normal gait [Deep Tendon Reflexes (DTR)] : deep tendon reflexes were 2+ and symmetric [Speech Grossly Normal] : speech grossly normal [Normal Affect] : the affect was normal [Normal Mood] : the mood was normal [Alert and Oriented x3] : oriented to person, place, and time [Normal Insight/Judgement] : insight and judgment were intact [FreeTextEntry1] : GI [de-identified] : gyn [de-identified] : Dystrophic nails that his fingernails secondary to chronic trauma, tinea corporis under both breasts and groin area. [de-identified] : GYN [de-identified] : forgetful

## 2024-03-18 NOTE — HISTORY OF PRESENT ILLNESS
[FreeTextEntry1] : Follow-up and disease management. [de-identified] : Patient is a 78-year-old female who presents today for follow-up and disease management, patient's medical history is significant for cognitive dysfunction, anxiety, depression, atrial fibrillation, hypertension, chronic obstructive pulmonary disease, diabetes mellitus type 2 and hypercholesterolemia.  Patient recently underwent genetic testing done by Dr. Harmeet Mathews neurology also had PET brain FDG dementia and diagnosed with frontotemporal dementia and was also seen at [facility].  Presently the patient is awake alert and oriented x 2 no acute distress accompanied by family member her daughter.

## 2024-03-18 NOTE — ASSESSMENT
[FreeTextEntry1] : Assessment and plan:  1.  Reviewed most recent subspecialist evaluation which include neurology, geriatrics, urology.  2.  Electrocardiogram recently done by cardiology no new findings.  3.  Comprehensive blood work drawn in office by examiner.  4.  Anxiety/depression patient will continue Lexapro 10 mg daily tolerating medications without ill side effects.  5.  Atrial fibrillation rate is well-controlled continue anticoagulation with Eliquis 5 mg p.o. twice a day.  6.  Hypertension today's blood pressure excellent at 115/70 therefore patient will continue present medical management which includes losartan 50 mg p.o. daily and metoprolol succinate 25 mg 1 tablet at bedtime which also helps with rate control  7.  COPD presently well-controlled physical exam was excellent  8.  Diabetes mellitus type 2 non-insulin-requiring continue metformin patient tolerating medications no ill side effects check hemoglobin A1c.  9.  Hypercholesterolemia continue present diet and atorvastatin 40 mg p.o. daily.  10.  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  11.  No change in medical management continue all medications as prescribed neck step will be determined by results of blood work patient is followed by multiple subspecialist reviewed all consultations except cardiology not available to me at this time.Total time spent face-to-face and non-face-to-face 40 most of which spent on counseling and coordination of care.

## 2024-04-16 ENCOUNTER — OUTPATIENT (OUTPATIENT)
Dept: OUTPATIENT SERVICES | Facility: HOSPITAL | Age: 79
LOS: 1 days | End: 2024-04-16
Payer: MEDICARE

## 2024-04-16 ENCOUNTER — APPOINTMENT (OUTPATIENT)
Dept: ULTRASOUND IMAGING | Facility: HOSPITAL | Age: 79
End: 2024-04-16
Payer: MEDICARE

## 2024-04-16 DIAGNOSIS — Z90.49 ACQUIRED ABSENCE OF OTHER SPECIFIED PARTS OF DIGESTIVE TRACT: Chronic | ICD-10-CM

## 2024-04-16 DIAGNOSIS — E80.6 OTHER DISORDERS OF BILIRUBIN METABOLISM: ICD-10-CM

## 2024-04-16 PROCEDURE — 76700 US EXAM ABDOM COMPLETE: CPT | Mod: 26

## 2024-04-16 PROCEDURE — 76700 US EXAM ABDOM COMPLETE: CPT

## 2024-04-17 DIAGNOSIS — R93.422 ABNORMAL RADIOLOGIC FINDINGS ON DIAGNOSITIC IMAGING OF LEFT KIDNEY: ICD-10-CM

## 2024-05-13 ENCOUNTER — APPOINTMENT (OUTPATIENT)
Dept: GERIATRICS | Facility: CLINIC | Age: 79
End: 2024-05-13
Payer: MEDICARE

## 2024-05-13 DIAGNOSIS — F03.918 UNSPECIFIED DEMENTIA, UNSPECIFIED SEVERITY, WITH OTHER BEHAVIORAL DISTURBANCE: ICD-10-CM

## 2024-05-13 DIAGNOSIS — Z63.6 DEPENDENT RELATIVE NEEDING CARE AT HOME: ICD-10-CM

## 2024-05-13 DIAGNOSIS — F32.A ANXIETY DISORDER, UNSPECIFIED: ICD-10-CM

## 2024-05-13 DIAGNOSIS — F41.9 ANXIETY DISORDER, UNSPECIFIED: ICD-10-CM

## 2024-05-13 PROCEDURE — 99443: CPT | Mod: 93

## 2024-05-13 PROCEDURE — G2211 COMPLEX E/M VISIT ADD ON: CPT | Mod: NC

## 2024-05-13 SDOH — SOCIAL STABILITY - SOCIAL INSECURITY: DEPENDENT RELATIVE NEEDING CARE AT HOME: Z63.6

## 2024-06-17 ENCOUNTER — APPOINTMENT (OUTPATIENT)
Dept: FAMILY MEDICINE | Facility: CLINIC | Age: 79
End: 2024-06-17

## 2024-06-28 ENCOUNTER — APPOINTMENT (OUTPATIENT)
Dept: FAMILY MEDICINE | Facility: CLINIC | Age: 79
End: 2024-06-28

## 2024-08-20 ENCOUNTER — APPOINTMENT (OUTPATIENT)
Dept: GERIATRICS | Facility: CLINIC | Age: 79
End: 2024-08-20

## 2024-09-10 ENCOUNTER — OUTPATIENT (OUTPATIENT)
Dept: OUTPATIENT SERVICES | Facility: HOSPITAL | Age: 79
LOS: 1 days | End: 2024-09-10
Payer: MEDICARE

## 2024-09-10 ENCOUNTER — APPOINTMENT (OUTPATIENT)
Dept: ULTRASOUND IMAGING | Facility: CLINIC | Age: 79
End: 2024-09-10

## 2024-09-10 DIAGNOSIS — E11.9 TYPE 2 DIABETES MELLITUS WITHOUT COMPLICATIONS: ICD-10-CM

## 2024-09-10 DIAGNOSIS — Z90.49 ACQUIRED ABSENCE OF OTHER SPECIFIED PARTS OF DIGESTIVE TRACT: Chronic | ICD-10-CM

## 2024-09-10 PROCEDURE — 76536 US EXAM OF HEAD AND NECK: CPT

## 2024-09-10 PROCEDURE — 76536 US EXAM OF HEAD AND NECK: CPT | Mod: 26

## 2024-09-21 ENCOUNTER — EMERGENCY (EMERGENCY)
Facility: HOSPITAL | Age: 79
LOS: 0 days | Discharge: HOME | End: 2024-09-21
Attending: STUDENT IN AN ORGANIZED HEALTH CARE EDUCATION/TRAINING PROGRAM
Payer: MEDICARE

## 2024-09-21 VITALS
HEART RATE: 65 BPM | DIASTOLIC BLOOD PRESSURE: 72 MMHG | RESPIRATION RATE: 18 BRPM | TEMPERATURE: 98 F | SYSTOLIC BLOOD PRESSURE: 106 MMHG | OXYGEN SATURATION: 99 %

## 2024-09-21 VITALS
HEART RATE: 60 BPM | SYSTOLIC BLOOD PRESSURE: 122 MMHG | OXYGEN SATURATION: 99 % | RESPIRATION RATE: 18 BRPM | TEMPERATURE: 99 F | DIASTOLIC BLOOD PRESSURE: 60 MMHG

## 2024-09-21 DIAGNOSIS — Z88.0 ALLERGY STATUS TO PENICILLIN: ICD-10-CM

## 2024-09-21 DIAGNOSIS — S01.01XA LACERATION WITHOUT FOREIGN BODY OF SCALP, INITIAL ENCOUNTER: ICD-10-CM

## 2024-09-21 DIAGNOSIS — E04.1 NONTOXIC SINGLE THYROID NODULE: ICD-10-CM

## 2024-09-21 DIAGNOSIS — J45.909 UNSPECIFIED ASTHMA, UNCOMPLICATED: ICD-10-CM

## 2024-09-21 DIAGNOSIS — E78.5 HYPERLIPIDEMIA, UNSPECIFIED: ICD-10-CM

## 2024-09-21 DIAGNOSIS — I48.91 UNSPECIFIED ATRIAL FIBRILLATION: ICD-10-CM

## 2024-09-21 DIAGNOSIS — E11.9 TYPE 2 DIABETES MELLITUS WITHOUT COMPLICATIONS: ICD-10-CM

## 2024-09-21 DIAGNOSIS — I10 ESSENTIAL (PRIMARY) HYPERTENSION: ICD-10-CM

## 2024-09-21 DIAGNOSIS — Z79.01 LONG TERM (CURRENT) USE OF ANTICOAGULANTS: ICD-10-CM

## 2024-09-21 DIAGNOSIS — Z88.2 ALLERGY STATUS TO SULFONAMIDES: ICD-10-CM
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
## 2024-09-21 DIAGNOSIS — Z90.49 ACQUIRED ABSENCE OF OTHER SPECIFIED PARTS OF DIGESTIVE TRACT: Chronic | ICD-10-CM

## 2024-09-21 PROCEDURE — 93005 ELECTROCARDIOGRAM TRACING: CPT | Mod: XU

## 2024-09-21 PROCEDURE — 99284 EMERGENCY DEPT VISIT MOD MDM: CPT | Mod: FS,25

## 2024-09-21 PROCEDURE — 99284 EMERGENCY DEPT VISIT MOD MDM: CPT | Mod: 25

## 2024-09-21 PROCEDURE — 72125 CT NECK SPINE W/O DYE: CPT | Mod: MC

## 2024-09-21 PROCEDURE — 72125 CT NECK SPINE W/O DYE: CPT | Mod: 26,MC

## 2024-09-21 PROCEDURE — 93010 ELECTROCARDIOGRAM REPORT: CPT

## 2024-09-21 PROCEDURE — 12001 RPR S/N/AX/GEN/TRNK 2.5CM/<: CPT

## 2024-09-21 PROCEDURE — 70450 CT HEAD/BRAIN W/O DYE: CPT | Mod: 26,MC

## 2024-09-21 PROCEDURE — 70450 CT HEAD/BRAIN W/O DYE: CPT | Mod: MC

## 2024-09-25 DIAGNOSIS — Z12.31 ENCOUNTER FOR SCREENING MAMMOGRAM FOR MALIGNANT NEOPLASM OF BREAST: ICD-10-CM

## 2024-10-01 ENCOUNTER — APPOINTMENT (OUTPATIENT)
Dept: FAMILY MEDICINE | Facility: CLINIC | Age: 79
End: 2024-10-01
Payer: MEDICARE

## 2024-10-01 VITALS
SYSTOLIC BLOOD PRESSURE: 122 MMHG | HEART RATE: 70 BPM | WEIGHT: 178 LBS | DIASTOLIC BLOOD PRESSURE: 68 MMHG | BODY MASS INDEX: 29.66 KG/M2 | OXYGEN SATURATION: 99 % | RESPIRATION RATE: 16 BRPM | HEIGHT: 65 IN | TEMPERATURE: 97 F

## 2024-10-01 DIAGNOSIS — I10 ESSENTIAL (PRIMARY) HYPERTENSION: ICD-10-CM
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
## 2024-10-01 DIAGNOSIS — F03.918 UNSPECIFIED DEMENTIA, UNSPECIFIED SEVERITY, WITH OTHER BEHAVIORAL DISTURBANCE: ICD-10-CM

## 2024-10-01 DIAGNOSIS — E11.9 TYPE 2 DIABETES MELLITUS W/OUT COMPLICATIONS: ICD-10-CM

## 2024-10-01 DIAGNOSIS — I48.91 UNSPECIFIED ATRIAL FIBRILLATION: ICD-10-CM
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
## 2024-10-01 DIAGNOSIS — S01.01XS LACERATION W/OUT FOREIGN BODY OF SCALP, SEQUELA: ICD-10-CM

## 2024-10-01 PROCEDURE — 99214 OFFICE O/P EST MOD 30 MIN: CPT

## 2024-10-01 NOTE — PLAN
[FreeTextEntry1] : Overall patient doing fairly well post fall. Read discussed fall precautions at home. Daughter has set up cameras in bedroom. Questionable mechanical fall But patient has been hypertensive recently per daughter.  Will decrease losartan to 25 mg a day. Requesting lab work prior to physical.  Workup for diabetes, nephropathy, lipids, and TFTs were sent. Will follow-up with Dr. Diop in 2 months Staple removed with no complications-after removal wound well-approximated and no bleeding noted

## 2024-10-01 NOTE — HISTORY OF PRESENT ILLNESS
[de-identified] : Post unwitnessed fall on 9/21. Head lac noted which was stabled at [place] ED. Reviewed notes and imaging.  CT of the head and neck was negative. Loop recorder was interrogated per daughter and reports negative for any arrhythmia. Overall patient feels well and denies any headaches, or altered mental status.  Daughter is concerned that her blood pressure has been running low.  Previous visit with specialist BP was in the low 100s.  Overall does not hydrate well.

## 2024-10-01 NOTE — PHYSICAL EXAM
[No Acute Distress] : no acute distress [Well Nourished] : well nourished [Well Developed] : well developed [Well-Appearing] : well-appearing [Normal Sclera/Conjunctiva] : normal sclera/conjunctiva [PERRL] : pupils equal round and reactive to light [EOMI] : extraocular movements intact [Normal Outer Ear/Nose] : the outer ears and nose were normal in appearance [Normal Oropharynx] : the oropharynx was normal [No JVD] : no jugular venous distention [No Lymphadenopathy] : no lymphadenopathy [Supple] : supple [Thyroid Normal, No Nodules] : the thyroid was normal and there were no nodules present [No Respiratory Distress] : no respiratory distress  [No Accessory Muscle Use] : no accessory muscle use [Clear to Auscultation] : lungs were clear to auscultation bilaterally [Normal Rate] : normal rate  [Regular Rhythm] : with a regular rhythm [Normal S1, S2] : normal S1 and S2 [No Murmur] : no murmur heard [No Carotid Bruits] : no carotid bruits [No Abdominal Bruit] : a ~M bruit was not heard ~T in the abdomen [No Varicosities] : no varicosities [Pedal Pulses Present] : the pedal pulses are present [No Edema] : there was no peripheral edema [No Palpable Aorta] : no palpable aorta [No Extremity Clubbing/Cyanosis] : no extremity clubbing/cyanosis [Soft] : abdomen soft [Non Tender] : non-tender [Non-distended] : non-distended [No Masses] : no abdominal mass palpated [No HSM] : no HSM [Normal Bowel Sounds] : normal bowel sounds [Normal Posterior Cervical Nodes] : no posterior cervical lymphadenopathy [Normal Anterior Cervical Nodes] : no anterior cervical lymphadenopathy [No CVA Tenderness] : no CVA  tenderness [No Spinal Tenderness] : no spinal tenderness [No Joint Swelling] : no joint swelling [Grossly Normal Strength/Tone] : grossly normal strength/tone [No Rash] : no rash [Coordination Grossly Intact] : coordination grossly intact [No Focal Deficits] : no focal deficits [Normal Gait] : normal gait [Deep Tendon Reflexes (DTR)] : deep tendon reflexes were 2+ and symmetric [Normal Affect] : the affect was normal [Normal Insight/Judgement] : insight and judgment were intact [de-identified] : Laceration is well-approximated with 1 staple noted

## 2024-11-07 ENCOUNTER — EMERGENCY (EMERGENCY)
Facility: HOSPITAL | Age: 79
LOS: 1 days | Discharge: HOME | End: 2024-11-07
Attending: EMERGENCY MEDICINE | Admitting: EMERGENCY MEDICINE
Payer: MEDICARE

## 2024-11-07 VITALS
SYSTOLIC BLOOD PRESSURE: 169 MMHG | HEART RATE: 62 BPM | DIASTOLIC BLOOD PRESSURE: 76 MMHG | TEMPERATURE: 98 F | WEIGHT: 169.98 LBS | HEIGHT: 65 IN | RESPIRATION RATE: 18 BRPM | OXYGEN SATURATION: 99 %

## 2024-11-07 DIAGNOSIS — Z90.49 ACQUIRED ABSENCE OF OTHER SPECIFIED PARTS OF DIGESTIVE TRACT: Chronic | ICD-10-CM

## 2024-11-07 PROCEDURE — 99284 EMERGENCY DEPT VISIT MOD MDM: CPT | Mod: 25

## 2024-11-07 PROCEDURE — 12002 RPR S/N/AX/GEN/TRNK2.6-7.5CM: CPT

## 2024-11-07 RX ORDER — LIDOCAINE HCL 60 MG/3 ML
5 SYRINGE (ML) INJECTION ONCE
Refills: 0 | Status: COMPLETED | OUTPATIENT
Start: 2024-11-07 | End: 2024-11-07

## 2024-11-07 RX ORDER — CLOSTRIDIUM TETANI TOXOID ANTIGEN (FORMALDEHYDE INACTIVATED), CORYNEBACTERIUM DIPHTHERIAE TOXOID ANTIGEN (FORMALDEHYDE INACTIVATED), BORDETELLA PERTUSSIS TOXOID ANTIGEN (GLUTARALDEHYDE INACTIVATED), BORDETELLA PERTUSSIS FILAMENTOUS HEMAGGLUTININ ANTIGEN (FORMALDEHYDE INACTIVATED), BORDETELLA PERTUSSIS PERTACTIN ANTIGEN, AND BORDETELLA PERTUSSIS FIMBRIAE 2/3 ANTIGEN 5; 2; 2.5; 5; 3; 5 [LF]/.5ML; [LF]/.5ML; UG/.5ML; UG/.5ML; UG/.5ML; UG/.5ML
0.5 INJECTION, SUSPENSION INTRAMUSCULAR ONCE
Refills: 0 | Status: COMPLETED | OUTPATIENT
Start: 2024-11-07 | End: 2024-11-07

## 2024-11-07 RX ADMIN — Medication 5 MILLILITER(S): at 21:36

## 2024-11-07 RX ADMIN — CLOSTRIDIUM TETANI TOXOID ANTIGEN (FORMALDEHYDE INACTIVATED), CORYNEBACTERIUM DIPHTHERIAE TOXOID ANTIGEN (FORMALDEHYDE INACTIVATED), BORDETELLA PERTUSSIS TOXOID ANTIGEN (GLUTARALDEHYDE INACTIVATED), BORDETELLA PERTUSSIS FILAMENTOUS HEMAGGLUTININ ANTIGEN (FORMALDEHYDE INACTIVATED), BORDETELLA PERTUSSIS PERTACTIN ANTIGEN, AND BORDETELLA PERTUSSIS FIMBRIAE 2/3 ANTIGEN 0.5 MILLILITER(S): 5; 2; 2.5; 5; 3; 5 INJECTION, SUSPENSION INTRAMUSCULAR at 22:25

## 2024-11-07 NOTE — ED PROVIDER NOTE - OBJECTIVE STATEMENT
79-year-old female past medical history of mild dementia on anticoagulation presenting with a laceration to the back of her right heel.  Patient states that she sat up from a chair and scraped it against a chair approximately 4 PM.  There was some bleeding which is since stopped.  Son came home said he irrigated the area placed peroxide and brought her to the hospital to make sure things okay.  Patient is ambulating at baseline.  Tetanus is up-to-date

## 2024-11-07 NOTE — ED PROVIDER NOTE - PROGRESS NOTE DETAILS
Based on the laceration 7 sutures were placed.  Part of the wound will likely dry up and dies it is very thin but will serve as a biological dressing.  Patient can have sutures out in 10 days.

## 2024-11-07 NOTE — ED PROVIDER NOTE - CLINICAL SUMMARY MEDICAL DECISION MAKING FREE TEXT BOX
Female presenting with a history of dementia anticoagulation diabetes with the a forementioned injury to the back of her heel.  Will numb up and see whether or not primary closure was most appropriate.  Tetanus already up-to-date

## 2024-11-07 NOTE — ED PROVIDER NOTE - NSFOLLOWUPINSTRUCTIONS_ED_ALL_ED_FT

## 2024-11-07 NOTE — ED PROVIDER NOTE - PHYSICAL EXAMINATION
Vitals: I have reviewed the patients vital signs  General: nontoxic appearing  HEENT: Atraumatic, normocephalic, airway patent  Eyes: EOMI, tracking appropriately  Neck: no tracheal deviation  Chest/Lungs: no trauma, symmetric chest rise, speaking in complete sentences,  no resp distress  Heart: skin and extremities well perfused, regular rate and rhythm  Neuro: A+Ox3, appears non focal  MSK: no deformities  Skin: no cyanosis, no jaundice on the back of the patient's right heel just distal to the Achilles attachment there is a small U-shaped laceration skin tear.  There is no active bleeding there is no bone exposed.  Psych:  Normal mood and affect

## 2024-11-07 NOTE — ED PROVIDER NOTE - PATIENT PORTAL LINK FT
You can access the FollowMyHealth Patient Portal offered by [facility] by registering at the following website: [email or web address]. By joining [facility]’s FollowMyHealth portal, you will also be able to view your health information using other applications (apps) compatible with our system.

## 2024-11-07 NOTE — ED ADULT NURSE NOTE - OBJECTIVE STATEMENT
Pt comae in from home for c.o laceration to back of her right foot. pt states she hit her right foot on a chair and has a cut to the back right heel. Pt on Eliquis. Bleeding controlled on arrival.

## 2024-11-07 NOTE — ED PROVIDER NOTE - OBJECTIVE STATEMENT
I have called patient regarding insurance verification for Celebrex use    Patient states has been stable using medication for years.  Has tried several other agents including ibuprofen 800 mg diclofenac topical, meloxicam 15 mg, naproxen 500 mg-all have not help with pain    Using medication for his chronic back pain.  Was with recent EMG which showed S1 radiculopathy    He is working on healthy diet and exercise which is helping with weight loss    
76 y/o F with h/o dementia BIB ems called by family c/o syncopal episode x2 , as per pt and family she was drinking colon prep for scheduled colonoscopy tomorrow , had lot os Bowel movement and while she wanted to go bathroom she almost passed out, second alia she was sitting and fainted , no chest pain, no dizziness

## 2024-11-11 NOTE — CHART NOTE - NSCHARTNOTEFT_GEN_A_CORE
Pt is a 80 y/o female presented to ED for laceration.  Pt has scheduled primary care appt with Harmeet Reilly on 12/13/24.  Pt is expected to return to the ED for suture removal with in 7 days.

## 2024-11-14 ENCOUNTER — NON-APPOINTMENT (OUTPATIENT)
Age: 79
End: 2024-11-14

## 2024-11-18 ENCOUNTER — APPOINTMENT (OUTPATIENT)
Dept: FAMILY MEDICINE | Facility: CLINIC | Age: 79
End: 2024-11-18
Payer: MEDICARE

## 2024-11-18 VITALS
DIASTOLIC BLOOD PRESSURE: 68 MMHG | SYSTOLIC BLOOD PRESSURE: 120 MMHG | HEART RATE: 66 BPM | OXYGEN SATURATION: 99 % | BODY MASS INDEX: 29.82 KG/M2 | TEMPERATURE: 97.2 F | HEIGHT: 65 IN | WEIGHT: 179 LBS | RESPIRATION RATE: 16 BRPM

## 2024-11-18 DIAGNOSIS — E11.9 TYPE 2 DIABETES MELLITUS W/OUT COMPLICATIONS: ICD-10-CM

## 2024-11-18 DIAGNOSIS — S91.311S LACERATION W/OUT FOREIGN BODY, RIGHT FOOT, SEQUELA: ICD-10-CM

## 2024-11-18 DIAGNOSIS — I48.91 UNSPECIFIED ATRIAL FIBRILLATION: ICD-10-CM

## 2024-11-18 DIAGNOSIS — F03.918 UNSPECIFIED DEMENTIA, UNSPECIFIED SEVERITY, WITH OTHER BEHAVIORAL DISTURBANCE: ICD-10-CM

## 2024-11-18 DIAGNOSIS — I10 ESSENTIAL (PRIMARY) HYPERTENSION: ICD-10-CM

## 2024-11-18 PROCEDURE — 99214 OFFICE O/P EST MOD 30 MIN: CPT

## 2024-11-20 PROCEDURE — 99283 EMERGENCY DEPT VISIT LOW MDM: CPT | Mod: 25

## 2024-11-20 PROCEDURE — 90715 TDAP VACCINE 7 YRS/> IM: CPT

## 2024-11-20 PROCEDURE — 12002 RPR S/N/AX/GEN/TRNK2.6-7.5CM: CPT

## 2024-11-20 PROCEDURE — 90471 IMMUNIZATION ADMIN: CPT

## 2024-11-24 PROBLEM — R92.8 ABNORMAL FINDING ON BREAST IMAGING: Status: ACTIVE | Noted: 2024-11-24

## 2024-12-10 ENCOUNTER — ASOB RESULT (OUTPATIENT)
Age: 79
End: 2024-12-10

## 2024-12-10 ENCOUNTER — APPOINTMENT (OUTPATIENT)
Dept: OBGYN | Facility: CLINIC | Age: 79
End: 2024-12-10
Payer: MEDICARE

## 2024-12-10 VITALS
BODY MASS INDEX: 29.82 KG/M2 | WEIGHT: 179 LBS | HEIGHT: 65 IN | SYSTOLIC BLOOD PRESSURE: 108 MMHG | DIASTOLIC BLOOD PRESSURE: 68 MMHG

## 2024-12-10 DIAGNOSIS — B35.4 TINEA CORPORIS: ICD-10-CM

## 2024-12-10 DIAGNOSIS — Z01.419 ENCOUNTER FOR GYNECOLOGICAL EXAMINATION (GENERAL) (ROUTINE) W/OUT ABNORMAL FINDINGS: ICD-10-CM

## 2024-12-10 PROCEDURE — G0444 DEPRESSION SCREEN ANNUAL: CPT

## 2024-12-10 PROCEDURE — G0328 FECAL BLOOD SCRN IMMUNOASSAY: CPT | Mod: QW

## 2024-12-10 PROCEDURE — 76830 TRANSVAGINAL US NON-OB: CPT

## 2024-12-10 PROCEDURE — G0101: CPT

## 2024-12-10 RX ORDER — NYSTATIN 100000 [USP'U]/G
100000 POWDER TOPICAL
Qty: 1 | Refills: 0 | Status: ACTIVE | COMMUNITY
Start: 2024-12-10 | End: 1900-01-01

## 2024-12-13 ENCOUNTER — APPOINTMENT (OUTPATIENT)
Dept: FAMILY MEDICINE | Facility: CLINIC | Age: 79
End: 2024-12-13
Payer: MEDICARE

## 2024-12-13 VITALS
BODY MASS INDEX: 30.32 KG/M2 | RESPIRATION RATE: 15 BRPM | TEMPERATURE: 97.4 F | HEIGHT: 65 IN | OXYGEN SATURATION: 99 % | SYSTOLIC BLOOD PRESSURE: 102 MMHG | DIASTOLIC BLOOD PRESSURE: 58 MMHG | WEIGHT: 182 LBS | HEART RATE: 66 BPM

## 2024-12-13 DIAGNOSIS — I10 ESSENTIAL (PRIMARY) HYPERTENSION: ICD-10-CM

## 2024-12-13 DIAGNOSIS — Z00.00 ENCOUNTER FOR GENERAL ADULT MEDICAL EXAMINATION W/OUT ABNORMAL FINDINGS: ICD-10-CM

## 2024-12-13 DIAGNOSIS — E78.00 PURE HYPERCHOLESTEROLEMIA, UNSPECIFIED: ICD-10-CM

## 2024-12-13 DIAGNOSIS — R41.89 OTHER SYMPTOMS AND SIGNS INVOLVING COGNITIVE FUNCTIONS AND AWARENESS: ICD-10-CM

## 2024-12-13 DIAGNOSIS — E11.9 TYPE 2 DIABETES MELLITUS W/OUT COMPLICATIONS: ICD-10-CM

## 2024-12-13 DIAGNOSIS — F32.A ANXIETY DISORDER, UNSPECIFIED: ICD-10-CM

## 2024-12-13 DIAGNOSIS — J44.9 CHRONIC OBSTRUCTIVE PULMONARY DISEASE, UNSPECIFIED: ICD-10-CM

## 2024-12-13 DIAGNOSIS — I48.91 UNSPECIFIED ATRIAL FIBRILLATION: ICD-10-CM

## 2024-12-13 DIAGNOSIS — M85.80 OTHER SPECIFIED DISORDERS OF BONE DENSITY AND STRUCTURE, UNSPECIFIED SITE: ICD-10-CM

## 2024-12-13 DIAGNOSIS — F41.9 ANXIETY DISORDER, UNSPECIFIED: ICD-10-CM

## 2024-12-13 PROCEDURE — G0439: CPT

## 2025-01-02 ENCOUNTER — RX RENEWAL (OUTPATIENT)
Age: 80
End: 2025-01-02

## 2025-01-08 ENCOUNTER — NON-APPOINTMENT (OUTPATIENT)
Age: 80
End: 2025-01-08

## 2025-01-14 DIAGNOSIS — R92.8 OTHER ABNORMAL AND INCONCLUSIVE FINDINGS ON DIAGNOSTIC IMAGING OF BREAST: ICD-10-CM

## 2025-01-15 ENCOUNTER — APPOINTMENT (OUTPATIENT)
Dept: GERIATRICS | Facility: CLINIC | Age: 80
End: 2025-01-15

## 2025-01-15 VITALS
WEIGHT: 182.13 LBS | DIASTOLIC BLOOD PRESSURE: 80 MMHG | HEIGHT: 65 IN | HEART RATE: 70 BPM | SYSTOLIC BLOOD PRESSURE: 126 MMHG | BODY MASS INDEX: 30.34 KG/M2 | TEMPERATURE: 97.3 F | RESPIRATION RATE: 16 BRPM | OXYGEN SATURATION: 98 %

## 2025-01-15 DIAGNOSIS — F03.918 UNSPECIFIED DEMENTIA, UNSPECIFIED SEVERITY, WITH OTHER BEHAVIORAL DISTURBANCE: ICD-10-CM

## 2025-01-15 DIAGNOSIS — L29.9 PRURITUS, UNSPECIFIED: ICD-10-CM

## 2025-01-15 DIAGNOSIS — E11.22 TYPE 2 DIABETES MELLITUS WITH DIABETIC CHRONIC KIDNEY DISEASE: ICD-10-CM

## 2025-01-15 DIAGNOSIS — R26.81 UNSTEADINESS ON FEET: ICD-10-CM

## 2025-01-15 DIAGNOSIS — Z71.89 OTHER SPECIFIED COUNSELING: ICD-10-CM

## 2025-01-15 DIAGNOSIS — Z63.6 DEPENDENT RELATIVE NEEDING CARE AT HOME: ICD-10-CM

## 2025-01-15 DIAGNOSIS — F03.B18 UNSPECIFIED DEMENTIA, MODERATE, WITH OTHER BEHAVIORAL DISTURBANCE: ICD-10-CM

## 2025-01-15 DIAGNOSIS — N18.30 TYPE 2 DIABETES MELLITUS WITH DIABETIC CHRONIC KIDNEY DISEASE: ICD-10-CM

## 2025-01-15 DIAGNOSIS — R29.6 REPEATED FALLS: ICD-10-CM

## 2025-01-15 PROCEDURE — G0520: CPT

## 2025-01-15 PROCEDURE — 99483 ASSMT & CARE PLN PT COG IMP: CPT | Mod: NC

## 2025-01-15 RX ORDER — BUDESONIDE 0.5 MG/2ML
0.5 INHALANT ORAL TWICE DAILY
Qty: 1 | Refills: 0 | Status: ACTIVE | COMMUNITY
Start: 2025-01-15

## 2025-01-15 RX ORDER — ARFORMOTEROL TARTRATE INHALATION SOLUTION 15 UG/2ML
15 SOLUTION RESPIRATORY (INHALATION) TWICE DAILY
Qty: 1 | Refills: 0 | Status: ACTIVE | COMMUNITY
Start: 2025-01-15

## 2025-01-15 RX ORDER — GABAPENTIN 100 MG/1
100 CAPSULE ORAL
Qty: 90 | Refills: 0 | Status: ACTIVE | COMMUNITY
Start: 2025-01-15

## 2025-01-15 RX ORDER — EZETIMIBE 10 MG/1
10 TABLET ORAL
Qty: 90 | Refills: 3 | Status: ACTIVE | COMMUNITY
Start: 2025-01-15

## 2025-01-15 RX ORDER — PETROLATUM,WHITE 41 %
OINTMENT (GRAM) TOPICAL DAILY
Qty: 1 | Refills: 0 | Status: ACTIVE | COMMUNITY
Start: 2025-01-15 | End: 1900-01-01

## 2025-01-15 SDOH — SOCIAL STABILITY - SOCIAL INSECURITY: DEPENDENT RELATIVE NEEDING CARE AT HOME: Z63.6

## 2025-01-16 ENCOUNTER — NON-APPOINTMENT (OUTPATIENT)
Age: 80
End: 2025-01-16

## 2025-02-05 ENCOUNTER — NON-APPOINTMENT (OUTPATIENT)
Age: 80
End: 2025-02-05

## 2025-02-26 ENCOUNTER — NON-APPOINTMENT (OUTPATIENT)
Age: 80
End: 2025-02-26

## 2025-02-26 DIAGNOSIS — Z63.6 DEPENDENT RELATIVE NEEDING CARE AT HOME: ICD-10-CM

## 2025-02-26 DIAGNOSIS — F03.918 UNSPECIFIED DEMENTIA, UNSPECIFIED SEVERITY, WITH OTHER BEHAVIORAL DISTURBANCE: ICD-10-CM

## 2025-02-26 SDOH — SOCIAL STABILITY - SOCIAL INSECURITY: DEPENDENT RELATIVE NEEDING CARE AT HOME: Z63.6

## 2025-03-10 ENCOUNTER — APPOINTMENT (OUTPATIENT)
Dept: GERIATRICS | Facility: CLINIC | Age: 80
End: 2025-03-10
Payer: MEDICARE

## 2025-03-10 VITALS
HEART RATE: 61 BPM | OXYGEN SATURATION: 99 % | RESPIRATION RATE: 20 BRPM | DIASTOLIC BLOOD PRESSURE: 72 MMHG | SYSTOLIC BLOOD PRESSURE: 124 MMHG

## 2025-03-10 DIAGNOSIS — F41.9 ANXIETY DISORDER, UNSPECIFIED: ICD-10-CM

## 2025-03-10 DIAGNOSIS — R26.81 UNSTEADINESS ON FEET: ICD-10-CM

## 2025-03-10 DIAGNOSIS — Z74.09 OTHER REDUCED MOBILITY: ICD-10-CM

## 2025-03-10 DIAGNOSIS — I10 ESSENTIAL (PRIMARY) HYPERTENSION: ICD-10-CM

## 2025-03-10 DIAGNOSIS — F32.A ANXIETY DISORDER, UNSPECIFIED: ICD-10-CM

## 2025-03-10 DIAGNOSIS — R29.6 REPEATED FALLS: ICD-10-CM

## 2025-03-10 DIAGNOSIS — Z78.9 OTHER REDUCED MOBILITY: ICD-10-CM

## 2025-03-10 DIAGNOSIS — Z63.6 DEPENDENT RELATIVE NEEDING CARE AT HOME: ICD-10-CM

## 2025-03-10 DIAGNOSIS — Z71.89 OTHER SPECIFIED COUNSELING: ICD-10-CM

## 2025-03-10 PROCEDURE — 99349 HOME/RES VST EST MOD MDM 40: CPT

## 2025-03-10 SDOH — SOCIAL STABILITY - SOCIAL INSECURITY: DEPENDENT RELATIVE NEEDING CARE AT HOME: Z63.6

## 2025-03-11 ENCOUNTER — APPOINTMENT (OUTPATIENT)
Dept: NEUROLOGY | Facility: CLINIC | Age: 80
End: 2025-03-11
Payer: MEDICARE

## 2025-03-11 VITALS
BODY MASS INDEX: 30.82 KG/M2 | WEIGHT: 185 LBS | SYSTOLIC BLOOD PRESSURE: 154 MMHG | DIASTOLIC BLOOD PRESSURE: 79 MMHG | HEIGHT: 65 IN | HEART RATE: 87 BPM

## 2025-03-11 DIAGNOSIS — F03.918 UNSPECIFIED DEMENTIA, UNSPECIFIED SEVERITY, WITH OTHER BEHAVIORAL DISTURBANCE: ICD-10-CM

## 2025-03-11 PROCEDURE — 99213 OFFICE O/P EST LOW 20 MIN: CPT

## 2025-03-11 RX ORDER — ESCITALOPRAM OXALATE 20 MG/1
20 TABLET, FILM COATED ORAL
Refills: 0 | Status: ACTIVE | COMMUNITY

## 2025-04-22 ENCOUNTER — NON-APPOINTMENT (OUTPATIENT)
Age: 80
End: 2025-04-22

## 2025-04-23 ENCOUNTER — NON-APPOINTMENT (OUTPATIENT)
Age: 80
End: 2025-04-23

## 2025-04-23 DIAGNOSIS — R26.81 UNSTEADINESS ON FEET: ICD-10-CM

## 2025-04-23 DIAGNOSIS — F03.918 UNSPECIFIED DEMENTIA, UNSPECIFIED SEVERITY, WITH OTHER BEHAVIORAL DISTURBANCE: ICD-10-CM

## 2025-05-22 ENCOUNTER — NON-APPOINTMENT (OUTPATIENT)
Age: 80
End: 2025-05-22

## 2025-05-22 DIAGNOSIS — F03.B18 UNSPECIFIED DEMENTIA, MODERATE, WITH OTHER BEHAVIORAL DISTURBANCE: ICD-10-CM

## 2025-05-22 DIAGNOSIS — Z63.6 DEPENDENT RELATIVE NEEDING CARE AT HOME: ICD-10-CM

## 2025-05-22 DIAGNOSIS — F03.918 UNSPECIFIED DEMENTIA, UNSPECIFIED SEVERITY, WITH OTHER BEHAVIORAL DISTURBANCE: ICD-10-CM

## 2025-05-22 SDOH — SOCIAL STABILITY - SOCIAL INSECURITY: DEPENDENT RELATIVE NEEDING CARE AT HOME: Z63.6

## 2025-06-18 ENCOUNTER — NON-APPOINTMENT (OUTPATIENT)
Age: 80
End: 2025-06-18

## 2025-06-24 ENCOUNTER — APPOINTMENT (OUTPATIENT)
Dept: GERIATRICS | Facility: CLINIC | Age: 80
End: 2025-06-24
Payer: MEDICARE

## 2025-06-24 PROCEDURE — 99215 OFFICE O/P EST HI 40 MIN: CPT | Mod: 2W

## 2025-06-24 PROCEDURE — G2211 COMPLEX E/M VISIT ADD ON: CPT | Mod: 2W

## 2025-07-15 ENCOUNTER — NON-APPOINTMENT (OUTPATIENT)
Age: 80
End: 2025-07-15

## 2025-08-28 ENCOUNTER — NON-APPOINTMENT (OUTPATIENT)
Age: 80
End: 2025-08-28

## 2025-08-28 DIAGNOSIS — F03.918 UNSPECIFIED DEMENTIA, UNSPECIFIED SEVERITY, WITH OTHER BEHAVIORAL DISTURBANCE: ICD-10-CM

## 2025-08-28 DIAGNOSIS — F03.B18 UNSPECIFIED DEMENTIA, MODERATE, WITH OTHER BEHAVIORAL DISTURBANCE: ICD-10-CM
